# Patient Record
Sex: MALE | Race: WHITE | Employment: OTHER | URBAN - METROPOLITAN AREA
[De-identification: names, ages, dates, MRNs, and addresses within clinical notes are randomized per-mention and may not be internally consistent; named-entity substitution may affect disease eponyms.]

---

## 2017-02-27 ENCOUNTER — OFFICE VISIT (OUTPATIENT)
Dept: FAMILY MEDICINE CLINIC | Age: 72
End: 2017-02-27

## 2017-02-27 VITALS
HEIGHT: 71 IN | WEIGHT: 175 LBS | DIASTOLIC BLOOD PRESSURE: 70 MMHG | BODY MASS INDEX: 24.5 KG/M2 | HEART RATE: 70 BPM | SYSTOLIC BLOOD PRESSURE: 128 MMHG

## 2017-02-27 DIAGNOSIS — G89.29 CHRONIC RIGHT-SIDED LOW BACK PAIN WITHOUT SCIATICA: ICD-10-CM

## 2017-02-27 DIAGNOSIS — G25.0 BENIGN ESSENTIAL TREMOR: Primary | ICD-10-CM

## 2017-02-27 DIAGNOSIS — J30.2 SEASONAL ALLERGIC RHINITIS, UNSPECIFIED ALLERGIC RHINITIS TRIGGER: ICD-10-CM

## 2017-02-27 DIAGNOSIS — M54.50 CHRONIC RIGHT-SIDED LOW BACK PAIN WITHOUT SCIATICA: ICD-10-CM

## 2017-02-27 PROCEDURE — 99214 OFFICE O/P EST MOD 30 MIN: CPT | Performed by: FAMILY MEDICINE

## 2017-02-27 RX ORDER — AZELASTINE 1 MG/ML
2 SPRAY, METERED NASAL 2 TIMES DAILY
Qty: 1 BOTTLE | Refills: 3 | Status: SHIPPED | OUTPATIENT
Start: 2017-02-27 | End: 2017-07-31

## 2017-03-08 ENCOUNTER — OFFICE VISIT (OUTPATIENT)
Dept: CARDIOLOGY CLINIC | Age: 72
End: 2017-03-08

## 2017-03-08 VITALS
HEIGHT: 71 IN | SYSTOLIC BLOOD PRESSURE: 132 MMHG | WEIGHT: 172.8 LBS | OXYGEN SATURATION: 92 % | BODY MASS INDEX: 24.19 KG/M2 | DIASTOLIC BLOOD PRESSURE: 64 MMHG | HEART RATE: 50 BPM

## 2017-03-08 DIAGNOSIS — R00.1 BRADYCARDIA: Primary | ICD-10-CM

## 2017-03-08 DIAGNOSIS — I49.8 OTHER CARDIAC ARRHYTHMIA: ICD-10-CM

## 2017-03-08 DIAGNOSIS — I10 ESSENTIAL HYPERTENSION: ICD-10-CM

## 2017-03-08 DIAGNOSIS — R00.2 PALPITATIONS: ICD-10-CM

## 2017-03-08 PROCEDURE — 99215 OFFICE O/P EST HI 40 MIN: CPT | Performed by: INTERNAL MEDICINE

## 2017-03-08 PROCEDURE — 93000 ELECTROCARDIOGRAM COMPLETE: CPT | Performed by: INTERNAL MEDICINE

## 2017-03-28 ENCOUNTER — OFFICE VISIT (OUTPATIENT)
Dept: FAMILY MEDICINE CLINIC | Age: 72
End: 2017-03-28

## 2017-03-28 VITALS
BODY MASS INDEX: 24.36 KG/M2 | SYSTOLIC BLOOD PRESSURE: 130 MMHG | HEART RATE: 70 BPM | DIASTOLIC BLOOD PRESSURE: 72 MMHG | WEIGHT: 174 LBS | HEIGHT: 71 IN

## 2017-03-28 DIAGNOSIS — G25.0 BENIGN ESSENTIAL TREMOR: Primary | ICD-10-CM

## 2017-03-28 DIAGNOSIS — I49.8 OTHER CARDIAC ARRHYTHMIA: ICD-10-CM

## 2017-03-28 DIAGNOSIS — J30.2 SEASONAL ALLERGIC RHINITIS, UNSPECIFIED ALLERGIC RHINITIS TRIGGER: ICD-10-CM

## 2017-03-28 PROCEDURE — 99214 OFFICE O/P EST MOD 30 MIN: CPT | Performed by: FAMILY MEDICINE

## 2017-03-28 RX ORDER — FLUTICASONE PROPIONATE 50 MCG
2 SPRAY, SUSPENSION (ML) NASAL 2 TIMES DAILY
Qty: 1 BOTTLE | Refills: 3 | Status: SHIPPED | OUTPATIENT
Start: 2017-03-28 | End: 2017-07-31

## 2017-06-01 ENCOUNTER — TELEPHONE (OUTPATIENT)
Dept: CARDIOLOGY CLINIC | Age: 72
End: 2017-06-01

## 2017-06-26 ENCOUNTER — OFFICE VISIT (OUTPATIENT)
Dept: FAMILY MEDICINE CLINIC | Age: 72
End: 2017-06-26

## 2017-06-26 VITALS
HEART RATE: 59 BPM | BODY MASS INDEX: 24.5 KG/M2 | WEIGHT: 175 LBS | SYSTOLIC BLOOD PRESSURE: 101 MMHG | HEIGHT: 71 IN | TEMPERATURE: 98.3 F | DIASTOLIC BLOOD PRESSURE: 49 MMHG

## 2017-06-26 DIAGNOSIS — J01.10 ACUTE NON-RECURRENT FRONTAL SINUSITIS: Primary | ICD-10-CM

## 2017-06-26 PROCEDURE — 99213 OFFICE O/P EST LOW 20 MIN: CPT | Performed by: FAMILY MEDICINE

## 2017-06-26 RX ORDER — AZITHROMYCIN 250 MG/1
TABLET, FILM COATED ORAL
Qty: 1 PACKET | Refills: 0 | Status: SHIPPED | OUTPATIENT
Start: 2017-06-26 | End: 2019-05-27

## 2017-06-26 RX ORDER — METHYLPREDNISOLONE 4 MG/1
TABLET ORAL
Qty: 1 KIT | Refills: 0 | Status: SHIPPED | OUTPATIENT
Start: 2017-06-26 | End: 2017-07-31

## 2017-07-12 ENCOUNTER — TELEPHONE (OUTPATIENT)
Dept: CARDIOLOGY CLINIC | Age: 72
End: 2017-07-12

## 2017-07-24 DIAGNOSIS — R00.1 BRADYCARDIA: ICD-10-CM

## 2017-07-24 LAB
ANION GAP SERPL CALCULATED.3IONS-SCNC: 15 MMOL/L (ref 3–16)
BUN BLDV-MCNC: 21 MG/DL (ref 7–20)
CALCIUM SERPL-MCNC: 9.1 MG/DL (ref 8.3–10.6)
CHLORIDE BLD-SCNC: 107 MMOL/L (ref 99–110)
CO2: 22 MMOL/L (ref 21–32)
CREAT SERPL-MCNC: 1.1 MG/DL (ref 0.8–1.3)
GFR AFRICAN AMERICAN: >60
GFR NON-AFRICAN AMERICAN: >60
GLUCOSE BLD-MCNC: 90 MG/DL (ref 70–99)
HCT VFR BLD CALC: 41.1 % (ref 40.5–52.5)
HEMOGLOBIN: 13.6 G/DL (ref 13.5–17.5)
INR BLD: 0.98 (ref 0.85–1.15)
MCH RBC QN AUTO: 31.9 PG (ref 26–34)
MCHC RBC AUTO-ENTMCNC: 33.2 G/DL (ref 31–36)
MCV RBC AUTO: 96 FL (ref 80–100)
PDW BLD-RTO: 12.7 % (ref 12.4–15.4)
PLATELET # BLD: 202 K/UL (ref 135–450)
PMV BLD AUTO: 9.1 FL (ref 5–10.5)
POTASSIUM SERPL-SCNC: 4.3 MMOL/L (ref 3.5–5.1)
PROTHROMBIN TIME: 11.1 SEC (ref 9.6–13)
RBC # BLD: 4.28 M/UL (ref 4.2–5.9)
SODIUM BLD-SCNC: 144 MMOL/L (ref 136–145)
WBC # BLD: 5.9 K/UL (ref 4–11)

## 2017-08-01 DIAGNOSIS — Z95.0 PACEMAKER: Primary | ICD-10-CM

## 2017-08-07 ENCOUNTER — PROCEDURE VISIT (OUTPATIENT)
Dept: CARDIOLOGY CLINIC | Age: 72
End: 2017-08-07

## 2017-08-07 ENCOUNTER — OFFICE VISIT (OUTPATIENT)
Dept: CARDIOLOGY CLINIC | Age: 72
End: 2017-08-07

## 2017-08-07 VITALS
DIASTOLIC BLOOD PRESSURE: 68 MMHG | WEIGHT: 172.38 LBS | OXYGEN SATURATION: 97 % | HEART RATE: 62 BPM | BODY MASS INDEX: 24.13 KG/M2 | SYSTOLIC BLOOD PRESSURE: 118 MMHG | HEIGHT: 71 IN

## 2017-08-07 DIAGNOSIS — I44.1 SYMPTOMATIC ADVANCED HEART BLOCK: Primary | ICD-10-CM

## 2017-08-07 DIAGNOSIS — Z95.0 PACEMAKER: ICD-10-CM

## 2017-08-07 DIAGNOSIS — I44.1 SYMPTOMATIC ADVANCED HEART BLOCK: ICD-10-CM

## 2017-08-07 DIAGNOSIS — G25.0 BENIGN ESSENTIAL TREMOR: ICD-10-CM

## 2017-08-07 PROCEDURE — 99213 OFFICE O/P EST LOW 20 MIN: CPT | Performed by: NURSE PRACTITIONER

## 2017-08-07 PROCEDURE — 93280 PM DEVICE PROGR EVAL DUAL: CPT | Performed by: INTERNAL MEDICINE

## 2017-08-08 ENCOUNTER — TELEPHONE (OUTPATIENT)
Dept: CARDIOLOGY CLINIC | Age: 72
End: 2017-08-08

## 2017-11-08 ENCOUNTER — PROCEDURE VISIT (OUTPATIENT)
Dept: CARDIOLOGY CLINIC | Age: 72
End: 2017-11-08

## 2017-11-08 ENCOUNTER — OFFICE VISIT (OUTPATIENT)
Dept: CARDIOLOGY CLINIC | Age: 72
End: 2017-11-08

## 2017-11-08 VITALS
DIASTOLIC BLOOD PRESSURE: 70 MMHG | BODY MASS INDEX: 24.22 KG/M2 | HEIGHT: 71 IN | HEART RATE: 56 BPM | SYSTOLIC BLOOD PRESSURE: 136 MMHG | WEIGHT: 173 LBS | OXYGEN SATURATION: 99 %

## 2017-11-08 DIAGNOSIS — Z95.0 PACEMAKER: ICD-10-CM

## 2017-11-08 DIAGNOSIS — R25.1 TREMOR: ICD-10-CM

## 2017-11-08 DIAGNOSIS — R53.83 FATIGUE, UNSPECIFIED TYPE: ICD-10-CM

## 2017-11-08 DIAGNOSIS — I44.1 SYMPTOMATIC ADVANCED HEART BLOCK: Primary | ICD-10-CM

## 2017-11-08 DIAGNOSIS — R00.2 PALPITATIONS: ICD-10-CM

## 2017-11-08 DIAGNOSIS — I44.1 SYMPTOMATIC ADVANCED HEART BLOCK: ICD-10-CM

## 2017-11-08 PROCEDURE — 99214 OFFICE O/P EST MOD 30 MIN: CPT | Performed by: NURSE PRACTITIONER

## 2017-11-08 PROCEDURE — 93280 PM DEVICE PROGR EVAL DUAL: CPT | Performed by: INTERNAL MEDICINE

## 2017-11-08 NOTE — PROGRESS NOTES
Lymphatic ROS: negative for  bleeding problems, blood clots, bruising or jaundice  · Endocrine ROS: negative for  skin changes, temperature intolerance or unexpected weight changes  · Respiratory ROS: negative for  cough, sputum, wheezing, shortness of breath   · Cardiovascular ROS: Per HPI. · Gastrointestinal ROS: negative for abdominal pain, diarrhea, nausea/vomiting, bleeding   · Musculoskeletal ROS: negative for  joint swelling, pain    · Neurological ROS: negative for  confusion, numbness/tingling, seizures, weakness  · Dermatological ROS: negative for rash, changes in skin color, lesions     Physical Examination:  /70 (Site: Left Arm, Position: Sitting, Cuff Size: Large Adult)   Pulse 56   Ht 5' 11\" (1.803 m)   Wt 173 lb (78.5 kg)   SpO2 99%   BMI 24.13 kg/m²     · Constitutional: Oriented. No distress. · Head: Normocephalic and atraumatic. · Mouth/Throat: Oropharynx is clear and moist.   · Eyes: Conjunctivae normal. EOM are normal.   · Neck: Normal range of motion. Neck supple. No rigidity. No JVD present. · Cardiovascular: Normal rate, regular rhythm, S1&S2 and intact distal pulses. · Pulmonary/Chest: Bilateral respiratory sounds. No wheezes. No rhonchi. · Abdominal: Soft. Bowel sounds present. No distension, No tenderness. · Musculoskeletal: No tenderness. No edema    · Neurological: Alert and oriented. Cranial nerve appears intact, No Gross deficit   · Skin: Skin is warm and dry. No rash noted.    · Psychiatric: Has a normal mood, affect and behavior       Labs:  Lab Results   Component Value Date    CREATININE 0.9 08/01/2017    BUN 19 08/01/2017     08/01/2017    K 4.2 08/01/2017     08/01/2017    CO2 23 08/01/2017       ECG: personally reviewed     Echo:  2012  EF 60%  Stress Test:   10/2012  No evidence of reversible ischemia     Device:     [employer] 8 DR-T  MVP  Device interrogated and functioning appropriately  No events no changes     Assessment: Patient Active Problem List    Diagnosis Date Noted    Symptomatic advanced heart block      Priority: Low    Benign essential tremor 02/27/2017     Priority: Low    Acute bronchitis 06/16/2014     Priority: Low    Lymphadenopathy 06/16/2014     Priority: Low    Peyronie's disease 06/16/2014     Priority: Low    Scapular dysfunction 01/21/2014     Priority: Low    Actinic keratoses 03/05/2013     Priority: Low    Back pain 10/24/2012     Priority: Low    Neuropathic pain of foot 10/24/2012     Priority: Low    Chest pain 10/03/2012     Priority: Low    Palpitations 02/22/2012     Priority: Low    Palpitation 02/15/2012     Priority: Low    Erectile dysfunction 02/13/2012     Priority: Low    Cough 02/13/2012     Priority: Low    Arrhythmia      Priority: Low    Allergic rhinitis      Priority: Low    Pacemaker 08/01/2017        Plan:  1. S/p PPM placement   Device interrogated and functioning appropriately  2. Bradycardia/Heart block    PPM protected   3. Fatigue/daytime sleepiness   Recommend outpatient sleep study however patient refuses  4. Tremor and gait instability   Instructed patient needs to address the gait instability with his PCP especially with associated tremors   No contra indication to beta-blocker therapy      Thank you for allowing me to participate in the care of Trista Terry. Further evaluation will be based upon the patient's clinical course and testing results. All questions and concerns were addressed to the patient/family. Alternatives to my treatment were discussed. Bruna Sanders, [address]  Electrophysiology   11/8/2017  8:57 AM      Patient has atrial fibrillation: No    Patient is on anticoagulation therapy:  No     Patient has CAD:  No        Patient instructed on life style modifications   Tobacco use was discussed with the patient and patient educated on the negative effects.  I have asked the patient to not utilize these agents.       FASTING LIPID PANEL:  Lab Results   Component Value Date    HDL 48 02/05/2012    LDLCALC 93 02/05/2012    TRIG 104 02/05/2012

## 2017-11-08 NOTE — PROGRESS NOTES
Patient comes in for programming evaluation for his pacemaker. All sensing and pacing parameters are within normal range. Lowered outputs in both chambers. Please see interrogation for more detail. Patient will follow up in 3 months in office or remotely.

## 2017-11-14 ENCOUNTER — OFFICE VISIT (OUTPATIENT)
Dept: FAMILY MEDICINE CLINIC | Age: 72
End: 2017-11-14

## 2017-11-14 VITALS
HEIGHT: 71 IN | SYSTOLIC BLOOD PRESSURE: 140 MMHG | BODY MASS INDEX: 24.22 KG/M2 | HEART RATE: 60 BPM | WEIGHT: 173 LBS | DIASTOLIC BLOOD PRESSURE: 80 MMHG

## 2017-11-14 DIAGNOSIS — Z00.00 WELL ADULT EXAM: Primary | ICD-10-CM

## 2017-11-14 DIAGNOSIS — M89.9 SCAPULAR DYSFUNCTION: ICD-10-CM

## 2017-11-14 DIAGNOSIS — G25.0 BENIGN ESSENTIAL TREMOR: ICD-10-CM

## 2017-11-14 DIAGNOSIS — Z13.220 SCREENING CHOLESTEROL LEVEL: ICD-10-CM

## 2017-11-14 DIAGNOSIS — Z23 NEED FOR INFLUENZA VACCINATION: ICD-10-CM

## 2017-11-14 DIAGNOSIS — I44.1 SYMPTOMATIC ADVANCED HEART BLOCK: ICD-10-CM

## 2017-11-14 LAB
A/G RATIO: 1.3 (ref 1.1–2.2)
ALBUMIN SERPL-MCNC: 4 G/DL (ref 3.4–5)
ALP BLD-CCNC: 45 U/L (ref 40–129)
ALT SERPL-CCNC: 14 U/L (ref 10–40)
ANION GAP SERPL CALCULATED.3IONS-SCNC: 13 MMOL/L (ref 3–16)
AST SERPL-CCNC: 22 U/L (ref 15–37)
BILIRUB SERPL-MCNC: 0.4 MG/DL (ref 0–1)
BUN BLDV-MCNC: 20 MG/DL (ref 7–20)
CALCIUM SERPL-MCNC: 9.8 MG/DL (ref 8.3–10.6)
CHLORIDE BLD-SCNC: 101 MMOL/L (ref 99–110)
CHOLESTEROL, TOTAL: 201 MG/DL (ref 0–199)
CO2: 28 MMOL/L (ref 21–32)
CREAT SERPL-MCNC: 1 MG/DL (ref 0.8–1.3)
GFR AFRICAN AMERICAN: >60
GFR NON-AFRICAN AMERICAN: >60
GLOBULIN: 3.2 G/DL
GLUCOSE BLD-MCNC: 89 MG/DL (ref 70–99)
HDLC SERPL-MCNC: 63 MG/DL (ref 40–60)
LDL CHOLESTEROL CALCULATED: 112 MG/DL
POTASSIUM SERPL-SCNC: 4.9 MMOL/L (ref 3.5–5.1)
SODIUM BLD-SCNC: 142 MMOL/L (ref 136–145)
TOTAL PROTEIN: 7.2 G/DL (ref 6.4–8.2)
TRIGL SERPL-MCNC: 131 MG/DL (ref 0–150)
VLDLC SERPL CALC-MCNC: 26 MG/DL

## 2017-11-14 PROCEDURE — 99397 PER PM REEVAL EST PAT 65+ YR: CPT | Performed by: FAMILY MEDICINE

## 2017-11-14 PROCEDURE — 90662 IIV NO PRSV INCREASED AG IM: CPT | Performed by: FAMILY MEDICINE

## 2017-11-14 PROCEDURE — 36415 COLL VENOUS BLD VENIPUNCTURE: CPT | Performed by: FAMILY MEDICINE

## 2017-11-14 PROCEDURE — G0008 ADMIN INFLUENZA VIRUS VAC: HCPCS | Performed by: FAMILY MEDICINE

## 2017-11-14 RX ORDER — TIZANIDINE 4 MG/1
4 TABLET ORAL NIGHTLY PRN
Qty: 20 TABLET | Refills: 0 | Status: SHIPPED | OUTPATIENT
Start: 2017-11-14 | End: 2018-10-02 | Stop reason: ALTCHOICE

## 2017-11-14 RX ORDER — PROPRANOLOL HCL 60 MG
60 CAPSULE, EXTENDED RELEASE 24HR ORAL DAILY
Qty: 30 CAPSULE | Refills: 5 | Status: SHIPPED | OUTPATIENT
Start: 2017-11-14 | End: 2018-03-05 | Stop reason: ALTCHOICE

## 2017-11-14 NOTE — PROGRESS NOTES
Call Labs were okay.
02/05/2012     Lab Results   Component Value Date    HDL 48 02/05/2012     Lab Results   Component Value Date    LDLCALC 93 02/05/2012     Lab Results   Component Value Date    LABVLDL 21 02/05/2012         Assessment   Plan     1. Well adult exam  Appears stable: Counselled diet, development, anticipatory guidance and safety issues with patient and or parent(s). 2. Need for influenza vaccination  vacinate  - INFLUENZA, HIGH DOSE, 65 YRS +, IM, PF, PREFILL SYR, 0.5ML (FLUZONE HD)    3. Symptomatic advanced heart block  S/p PPM, continue to follow. 4. Benign essential tremor  Will trial propranolol, counseled pt expect 50%improvement and may use on PRN basis, may add primidone if needs. - propranolol (INDERAL LA) 60 MG extended release capsule; Take 1 capsule by mouth daily  Dispense: 30 capsule; Refill: 5    5. Scapular dysfunction  Chronic: continue prn zanaflex. - tiZANidine (ZANAFLEX) 4 MG tablet; Take 1 tablet by mouth nightly as needed (1 hour before bed  - then ice it.)  Dispense: 20 tablet; Refill: 0    6. Screening cholesterol level  Screen. - Comprehensive Metabolic Panel  - Lipid Panel    Andrew received counseling on the following healthy behaviors: nutrition and exercise    Patient given educational materials on Nutrition and Exercise    Discussed use, benefit, and side effects of prescribed medications. Barriers to medication compliance addressed. All patient questions answered. Pt voiced understanding. RTC in 5 months (will be in [place] until march).

## 2018-01-05 ENCOUNTER — TELEPHONE (OUTPATIENT)
Dept: FAMILY MEDICINE CLINIC | Age: 73
End: 2018-01-05

## 2018-01-05 NOTE — TELEPHONE ENCOUNTER
Patient notes he is having chest pain radiating to his neck and armpit with exertion when he was on the medication. Tells me he went to the emergency room and they told him to stop the medication and since that time his been able to walk without pain. I recommended patient get a stress test he said he wants to wait until he comes back and sees me. Patient contracts to go to the emergency room if symptoms recur.

## 2018-01-05 NOTE — TELEPHONE ENCOUNTER
Pt is in [place]. Wanted to let you know that he has discontinued taking profanathol (he was taking this med for tremors). States the med caused him to have severe chest pains. Wanted to let you know.  If you would like to speak with him, please call him at [phone]

## 2018-01-15 ENCOUNTER — TELEPHONE (OUTPATIENT)
Dept: FAMILY MEDICINE CLINIC | Age: 73
End: 2018-01-15

## 2018-01-15 NOTE — TELEPHONE ENCOUNTER
Please call:  I would have him see a cardiologist affiliated with a hospital that does cardiothoracic surgery.       Unfortunately, I do not have local knowledge of such a facility down there

## 2018-02-13 ENCOUNTER — NURSE ONLY (OUTPATIENT)
Dept: CARDIOLOGY CLINIC | Age: 73
End: 2018-02-13

## 2018-02-13 DIAGNOSIS — I44.1 SYMPTOMATIC ADVANCED HEART BLOCK: ICD-10-CM

## 2018-02-13 DIAGNOSIS — Z95.0 PACEMAKER: ICD-10-CM

## 2018-02-13 PROCEDURE — 93296 REM INTERROG EVL PM/IDS: CPT | Performed by: INTERNAL MEDICINE

## 2018-02-13 PROCEDURE — 93294 REM INTERROG EVL PM/LDLS PM: CPT | Performed by: INTERNAL MEDICINE

## 2018-02-13 NOTE — PROGRESS NOTES
Remote/Biotronik interrogation shows normal device function. No new episodes/arrhythmias recorded. Dr. Garzon Gisela to review interrogation. See interrogation/Paceart report for further details.

## 2018-03-05 ENCOUNTER — OFFICE VISIT (OUTPATIENT)
Dept: CARDIOLOGY CLINIC | Age: 73
End: 2018-03-05

## 2018-03-05 VITALS
BODY MASS INDEX: 24.3 KG/M2 | OXYGEN SATURATION: 97 % | SYSTOLIC BLOOD PRESSURE: 130 MMHG | WEIGHT: 173.6 LBS | DIASTOLIC BLOOD PRESSURE: 80 MMHG | HEIGHT: 71 IN | HEART RATE: 66 BPM

## 2018-03-05 DIAGNOSIS — R07.2 PRECORDIAL PAIN: ICD-10-CM

## 2018-03-05 DIAGNOSIS — R00.1 BRADYCARDIA: Primary | ICD-10-CM

## 2018-03-05 DIAGNOSIS — I10 ESSENTIAL HYPERTENSION: ICD-10-CM

## 2018-03-05 PROCEDURE — 93000 ELECTROCARDIOGRAM COMPLETE: CPT | Performed by: INTERNAL MEDICINE

## 2018-03-05 PROCEDURE — 99215 OFFICE O/P EST HI 40 MIN: CPT | Performed by: INTERNAL MEDICINE

## 2018-03-05 NOTE — PATIENT INSTRUCTIONS
Patient Education        Chest Pain: Care Instructions  Your Care Instructions    There are many things that can cause chest pain. Some are not serious and will get better on their own in a few days. But some kinds of chest pain need more testing and treatment. Your doctor may have recommended a follow-up visit in the next 8 to 12 hours. If you are not getting better, you may need more tests or treatment. Even though your doctor has released you, you still need to watch for any problems. The doctor carefully checked you, but sometimes problems can develop later. If you have new symptoms or if your symptoms do not get better, get medical care right away. If you have worse or different chest pain or pressure that lasts more than 5 minutes or you passed out (lost consciousness), call 911 or seek other emergency help right away. A medical visit is only one step in your treatment. Even if you feel better, you still need to do what your doctor recommends, such as going to all suggested follow-up appointments and taking medicines exactly as directed. This will help you recover and help prevent future problems. How can you care for yourself at home? · Rest until you feel better. · Take your medicine exactly as prescribed. Call your doctor if you think you are having a problem with your medicine. · Do not drive after taking a prescription pain medicine. When should you call for help? Call 911 if:  ? · You passed out (lost consciousness). ? · You have severe difficulty breathing. ? · You have symptoms of a heart attack. These may include:  ¨ Chest pain or pressure, or a strange feeling in your chest.  ¨ Sweating. ¨ Shortness of breath. ¨ Nausea or vomiting. ¨ Pain, pressure, or a strange feeling in your back, neck, jaw, or upper belly or in one or both shoulders or arms. ¨ Lightheadedness or sudden weakness. ¨ A fast or irregular heartbeat.   After you call 911, the  may tell you to chew 1 adult-strength or 2 to 4 low-dose aspirin. Wait for an ambulance. Do not try to drive yourself. ?Call your doctor today if:  ? · You have any trouble breathing. ? · Your chest pain gets worse. ? · You are dizzy or lightheaded, or you feel like you may faint. ? · You are not getting better as expected. ? · You are having new or different chest pain. Where can you learn more? Go to [email or web address]://[email or web address].[email or web address]. org and sign in to your [email or web address] account. Enter A120 in the Tripbod box to learn more about \"Chest Pain: Care Instructions. \"     If you do not have an account, please click on the \"Sign Up Now\" link. Current as of: March 20, 2017  Content Version: 11.5  © 4508-1778 Healthwise, Incorporated. Care instructions adapted under license by [facility] ([facility]). If you have questions about a medical condition or this instruction, always ask your healthcare professional. [place] [address] any warranty or liability for your use of this information.

## 2018-03-08 ENCOUNTER — HOSPITAL ENCOUNTER (OUTPATIENT)
Dept: NON INVASIVE DIAGNOSTICS | Age: 73
Discharge: HOME | End: 2018-03-08
Admitting: INTERNAL MEDICINE

## 2018-03-08 DIAGNOSIS — R07.2 PRECORDIAL PAIN: ICD-10-CM

## 2018-03-08 LAB
LV EF: 68 %
LVEF MODALITY: NORMAL

## 2018-03-28 ENCOUNTER — OFFICE VISIT (OUTPATIENT)
Dept: FAMILY MEDICINE CLINIC | Age: 73
End: 2018-03-28

## 2018-03-28 VITALS
BODY MASS INDEX: 24.53 KG/M2 | WEIGHT: 175.25 LBS | OXYGEN SATURATION: 97 % | HEIGHT: 71 IN | DIASTOLIC BLOOD PRESSURE: 74 MMHG | SYSTOLIC BLOOD PRESSURE: 118 MMHG | HEART RATE: 76 BPM

## 2018-03-28 DIAGNOSIS — G25.0 BENIGN ESSENTIAL TREMOR: Primary | ICD-10-CM

## 2018-03-28 DIAGNOSIS — I44.1 SYMPTOMATIC ADVANCED HEART BLOCK: ICD-10-CM

## 2018-03-28 PROCEDURE — 99214 OFFICE O/P EST MOD 30 MIN: CPT | Performed by: FAMILY MEDICINE

## 2018-03-28 ASSESSMENT — ENCOUNTER SYMPTOMS
SHORTNESS OF BREATH: 0
NAUSEA: 0
COUGH: 0
DIARRHEA: 0
CONSTIPATION: 0
VOMITING: 0
ABDOMINAL PAIN: 0

## 2018-03-28 ASSESSMENT — PATIENT HEALTH QUESTIONNAIRE - PHQ9
SUM OF ALL RESPONSES TO PHQ9 QUESTIONS 1 & 2: 0
SUM OF ALL RESPONSES TO PHQ QUESTIONS 1-9: 0
2. FEELING DOWN, DEPRESSED OR HOPELESS: 0
1. LITTLE INTEREST OR PLEASURE IN DOING THINGS: 0

## 2018-03-28 NOTE — PROGRESS NOTES
Chief Complaint   Patient presents with    Tremors     Follow up on medication- Med did not work out for him       HPI: General Collin presents for evaluation and management of Tremor. He has noted a chronic tremor that tended to get worse towards the end of the day and was affecting his ability to work as he is a  and also even affecting fine movements such as moving a mouse for computer. He tells me that he thinks it gets worse after a night of drinking. That said he has no plans of cutting back. He tells me that the medicine I gave him in the past for this tremor, Inderal was giving him chest pain so he quit. He notes no exertional chest pain and less he is taking his propranolol. He saw Dr. Alecia Magaña earlier this month and told him about his substernal chest pain that radiated out to his shoulders and neck and underwent a nuclear stress test for this. It should be noticed that he is paced for advanced heart block. His nuclear stress test was normal.  He notes no persistence of chest pain at this time      Review of Systems   Constitutional: Negative for chills and fever. Respiratory: Negative for cough and shortness of breath. Cardiovascular: Negative for chest pain and palpitations. Gastrointestinal: Negative for abdominal pain, constipation, diarrhea, nausea and vomiting. Endocrine: Negative for polyuria. Genitourinary: Negative for dysuria. Allergies   Allergen Reactions    Inderal [Propranolol] Other (See Comments)     Severe Chest Pains     New Prescriptions    No medications on file     Current Outpatient Prescriptions   Medication Sig Dispense Refill    tiZANidine (ZANAFLEX) 4 MG tablet Take 1 tablet by mouth nightly as needed (1 hour before bed  - then ice it.) 20 tablet 0     No current facility-administered medications for this visit.         Past Medical History:   Diagnosis Date    Advance directive discussed with patient     DNR - Has Living will    Allergic

## 2018-06-05 ENCOUNTER — NURSE ONLY (OUTPATIENT)
Dept: CARDIOLOGY CLINIC | Age: 73
End: 2018-06-05

## 2018-06-05 DIAGNOSIS — I44.1 SYMPTOMATIC ADVANCED HEART BLOCK: ICD-10-CM

## 2018-06-05 DIAGNOSIS — Z95.0 PACEMAKER: ICD-10-CM

## 2018-06-05 PROCEDURE — 93296 REM INTERROG EVL PM/IDS: CPT | Performed by: INTERNAL MEDICINE

## 2018-06-05 PROCEDURE — 93294 REM INTERROG EVL PM/LDLS PM: CPT | Performed by: INTERNAL MEDICINE

## 2018-09-10 PROCEDURE — 93294 REM INTERROG EVL PM/LDLS PM: CPT | Performed by: INTERNAL MEDICINE

## 2018-09-10 PROCEDURE — 93296 REM INTERROG EVL PM/IDS: CPT | Performed by: INTERNAL MEDICINE

## 2018-09-11 ENCOUNTER — NURSE ONLY (OUTPATIENT)
Dept: CARDIOLOGY CLINIC | Age: 73
End: 2018-09-11

## 2018-09-11 DIAGNOSIS — Z95.0 PACEMAKER: ICD-10-CM

## 2018-09-11 DIAGNOSIS — R00.2 PALPITATIONS: ICD-10-CM

## 2018-09-11 DIAGNOSIS — I44.1 SYMPTOMATIC ADVANCED HEART BLOCK: ICD-10-CM

## 2018-09-11 NOTE — LETTER
[address] [facility] [phone]  [facility] [address] [phone]    Pacemaker/Defibrillator Clinic          09/10/18        [place] [address]        Dear Shukri Part Day    This letter is to inform you that we received the transmission from your monitor at home that checks your pacemaker and/or defibrillator, or implanted heart monitor. The next date your monitor will automatically transmit will be 12/18/18. Please do not send additional routine transmissions unless specifically requested. Your device and monitor are wireless and most transmit cellularly, but please periodically check your monitor is still plugged in to the electrical outlet. If you still use the telephone land line to send please ensure the connection to the phone javier is secure. This will help to ensure successful automatic transmissions in the future. Also, the monitor needs to be close to you while sleeping at night. Please be aware that the remote device transmission sites are periodically monitored only during regular business hours during which simultaneous in-office device clinics are being run. If your transmission requires attention, we will contact you as soon as possible. Thank you.             [address]

## 2018-10-02 ENCOUNTER — OFFICE VISIT (OUTPATIENT)
Dept: FAMILY MEDICINE CLINIC | Age: 73
End: 2018-10-02
Payer: MEDICARE

## 2018-10-02 VITALS
BODY MASS INDEX: 23.66 KG/M2 | SYSTOLIC BLOOD PRESSURE: 91 MMHG | HEART RATE: 59 BPM | WEIGHT: 169 LBS | HEIGHT: 71 IN | DIASTOLIC BLOOD PRESSURE: 64 MMHG

## 2018-10-02 DIAGNOSIS — G89.29 CHRONIC RIGHT-SIDED LOW BACK PAIN WITHOUT SCIATICA: ICD-10-CM

## 2018-10-02 DIAGNOSIS — M54.50 CHRONIC RIGHT-SIDED LOW BACK PAIN WITHOUT SCIATICA: ICD-10-CM

## 2018-10-02 DIAGNOSIS — Z13.6 SCREENING FOR AAA (ABDOMINAL AORTIC ANEURYSM): ICD-10-CM

## 2018-10-02 DIAGNOSIS — Z12.11 SCREEN FOR COLON CANCER: ICD-10-CM

## 2018-10-02 DIAGNOSIS — I49.8 OTHER CARDIAC ARRHYTHMIA: Primary | ICD-10-CM

## 2018-10-02 DIAGNOSIS — G20 PARKINSON DISEASE (HCC): ICD-10-CM

## 2018-10-02 DIAGNOSIS — Z23 NEED FOR VACCINATION: ICD-10-CM

## 2018-10-02 PROCEDURE — G0008 ADMIN INFLUENZA VIRUS VAC: HCPCS | Performed by: FAMILY MEDICINE

## 2018-10-02 PROCEDURE — 99214 OFFICE O/P EST MOD 30 MIN: CPT | Performed by: FAMILY MEDICINE

## 2018-10-02 PROCEDURE — G0009 ADMIN PNEUMOCOCCAL VACCINE: HCPCS | Performed by: FAMILY MEDICINE

## 2018-10-02 PROCEDURE — 90732 PPSV23 VACC 2 YRS+ SUBQ/IM: CPT | Performed by: FAMILY MEDICINE

## 2018-10-02 PROCEDURE — 90682 RIV4 VACC RECOMBINANT DNA IM: CPT | Performed by: FAMILY MEDICINE

## 2018-10-02 ASSESSMENT — ENCOUNTER SYMPTOMS
ABDOMINAL PAIN: 0
COUGH: 0
SHORTNESS OF BREATH: 0
CONSTIPATION: 0
DIARRHEA: 0
NAUSEA: 0
VOMITING: 0

## 2018-10-02 NOTE — PROGRESS NOTES
Vaccine Information Sheet, \"Influenza - Inactivated\"  given to Barry Terry, or parent/legal guardian of  Barry Terry and verbalized understanding. Patient responses:    Have you ever had a reaction to a flu vaccine? No  Are you able to eat eggs without adverse effects? Yes  Do you have any current illness? No  Have you ever had [place] Syndrome? No    Flu vaccine given per order. Please see immunization tab.
Saira Schneider when he comes back in to clinic if he is interested    2. Other cardiac arrhythmia  Controlled: Appears stable. We will continue current management and monitor for adverse reaction and disease progression. Follow-up as noted below      3. Chronic right-sided low back pain without sciatica  Controlled: Appears stable. We will continue current management and monitor for adverse reaction and disease progression. Follow-up as noted below      4. Screen for colon cancer  Screen  - Amb External Referral To Gastroenterology    5. Need for vaccination  Vaccinate  - Pneumococcal polysaccharide vaccine 23-valent greater than or equal to 3yo subcutaneous/IM  - INFLUENZA, QUADV, RECOMBINANT, 18 YRS AND OLDER, IM, PF, PREFILL SYR OR SDV, 0.5ML (FLUBLOK QUADV, PF)    6. Screening for AAA (abdominal aortic aneurysm)  Screen  - US ABDOMINAL AORTA LIMITED      RTC 6-12 months and as needed    Scribe attestation:  I, Rosa Carney, am scribing for and in the presence of Cassidy Liao MD. Electronically signed by Rosa Carney on 10/2/2018 at 4:08 PM            Provider attestation: Isma Tinsley MD, personally performed the services scribed by the user listed above in my presence, and it is both accurate and complete. I agree with the ROS and Past Histories independently gathered by the clinical support staff and the remaining scribed note accurately describes my personal service to the patient.     [facility]    10/2/2018  4:31 PM

## 2018-10-05 NOTE — PROGRESS NOTES
[address]   Cardiac Electrophysiology Consultation   Date: 10/5/2018  Reason for Consultation: Device Management  Consult Requesting Physician: Servando Trevino MD    Chief Complaint:   Chief Complaint   Patient presents with    6 Month Follow-Up     S/P PPM. Device check today- fatigue        HPI: Delmy Terry is a 75 y. o. with a  PMH significant for HTN who was initially seen in consultation on 10/31/16 for bradycardia. He had a long standing history of asymptomatic bradycardia that was not limiting his activity. It was decided to defer a PPM and montor-- however, one year later he reported progressive symptoms and decided to undergo Biotronik 2-chamber PPM implant 7/2017. Today, he arrives for a follow up for device management. He reports ongoing issues with fatigue and lightheadedness with quick positional changes. He doesn't feel that the device implant has helped with his symptoms. Regardless, he just returned from hiking in [facility] and doesn't necessarily feel limited in his activity level. He walks approximately 5 miles per day and swims often for exercise. 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 He denies lightheadedness, dizziness, chest pain, palpitations, orthopnea, edema, presyncope or syncope. Past Medical History:   Diagnosis Date    Advance directive discussed with patient     DNR - Has Living will    Allergic rhinitis     Arrhythmia     s/p pacemaker for heart block: Dr. Migeul Ángel Mabry 7/31/17    Benign essential tremor 2/27/2017    Brachial neuritis     Chronic back pain     Erectile dysfunction 2/13/2012    Hypertension 2/13/2012    Parkinson disease ([facility]     Dr. Jailyn Pat Symptomatic advanced heart block         Past Surgical History:   Procedure Laterality Date   [address]    ACDF   [address]    with Boswell ankur       Allergies:   Allergies   Allergen Reactions    Inderal [Propranolol] Other (See Comments) Severe Chest Pains       Medication:   Prior to Admission medications    Medication Sig Start Date End Date Taking? Authorizing Provider   carbidopa-levodopa (SINEMET)  MG per tablet  8/7/18  Yes Historical Provider, MD       Social History:   reports that he quit smoking about 11 years ago. He smoked 0.00 packs per day. He has quit using smokeless tobacco. He reports that he drinks about 9.0 oz of alcohol per week . Family History:  family history includes Other in his father; Stroke in his mother. Reviewed. Denies family history of sudden cardiac death, arrhythmia, premature CAD    Review of System:    · General ROS: negative for - chills, fever +fatigue  · Psychological ROS: negative for - anxiety or depression  · Ophthalmic ROS: negative for - eye pain or loss of vision  · ENT ROS: negative for - epistaxis, headaches, nasal discharge, sore throat   · Allergy and Immunology ROS: negative for - hives, nasal congestion   · Hematological and Lymphatic ROS: negative for - bleeding problems, blood clots, bruising or jaundice  · Endocrine ROS: negative for - skin changes, temperature intolerance or unexpected weight changes  · Respiratory ROS: negative for - cough, hemoptysis, pleuritic pain, SOB, sputum changes or wheezing  · Cardiovascular ROS: Per HPI. · Gastrointestinal ROS: negative for - abdominal pain, blood in stools, diarrhea, hematemesis, melena,  nausea/vomiting or swallowing difficulty/pain  · Genito-Urinary ROS: negative for - dysuria or incontinence  · Musculoskeletal ROS: negative for - joint swelling or muscle pain  · Neurological ROS: negative for - confusion, dizziness, gait disturbance, headaches, numbness/tingling,  seizures, speech problems, tremors, visual changes or weakness  · Dermatological ROS: negative for - rash    Physical Examination:  Vitals:    10/08/18 0823   BP: 122/82   Pulse: 68   SpO2: 99%       · Constitutional: Oriented. No distress.    · Head: Normocephalic and atraumatic. · Mouth/Throat: Oropharynx is clear and moist.   · Eyes: Conjunctivae normal. EOM are normal.   · Neck: Normal range of motion. Neck supple. No rigidity. No JVD present. · Cardiovascular: Normal rate, regular rhythm, S1&S2 and intact distal pulses. · Pulmonary/Chest: Bilateral respiratory sounds. No wheezes. No rhonchi. · Abdominal: Soft. Bowel sounds present. No distension, No tenderness. · Musculoskeletal: No tenderness. No edema    · Lymphadenopathy: Has no cervical adenopathy. · Neurological: Alert and oriented. Cranial nerve appears intact, No Gross deficit   · Skin: Skin is warm and dry. No rash noted. +LCW device implant  · Psychiatric: Has a normal mood, affect and behavior     Labs:  Reviewed. ECG: reviewed, 10/8/18 V paced with underlying SR    Other Non-Invasive Studies:   1. Event monitor:   None    2. Echo: 2/2012  Normal study. LVEF 60%    3. Stress Test:   Normal myocardial perfusion study.    Normal myocardial perfusion.    Normal LV size and systolic function.    Overall findings represent a low risk study.         4. Cath:   None     Procedures:  1.  Biotronik dual chamber PPM implant on 8/7/17 with Dr. Todd Rodriguez:   Patient Active Problem List    Diagnosis Date Noted    Parkinson disease [facility])     Pacemaker 08/01/2017    Symptomatic advanced heart block     Benign essential tremor 02/27/2017    Peyronie's disease 06/16/2014    Scapular dysfunction 01/21/2014    Actinic keratoses 03/05/2013    Back pain 10/24/2012    Neuropathic pain of foot 10/24/2012    Erectile dysfunction 02/13/2012    Arrhythmia     Allergic rhinitis         Plan:    Bradycardia with premature junctional beats, PVC's and blocked PAC's:  -S/p Biotronik PPM implant  -Device interrogated today and functioning appropriately  -AP 59%,  69%  -No afib/ flutter  -Approximately 8.5 years remaining on battery life  -He denies that the device is helping  -In order to decipher whether

## 2018-10-08 ENCOUNTER — NURSE ONLY (OUTPATIENT)
Dept: CARDIOLOGY CLINIC | Age: 73
End: 2018-10-08
Payer: MEDICARE

## 2018-10-08 ENCOUNTER — HOSPITAL ENCOUNTER (EMERGENCY)
Age: 73
Discharge: HOME | End: 2018-10-08
Attending: EMERGENCY MEDICINE
Payer: MEDICARE

## 2018-10-08 ENCOUNTER — TELEPHONE (OUTPATIENT)
Dept: CARDIOLOGY CLINIC | Age: 73
End: 2018-10-08

## 2018-10-08 ENCOUNTER — HOSPITAL ENCOUNTER (OUTPATIENT)
Dept: ULTRASOUND IMAGING | Age: 73
Discharge: HOME | End: 2018-10-08
Payer: MEDICARE

## 2018-10-08 ENCOUNTER — OFFICE VISIT (OUTPATIENT)
Dept: CARDIOLOGY CLINIC | Age: 73
End: 2018-10-08
Payer: MEDICARE

## 2018-10-08 VITALS
SYSTOLIC BLOOD PRESSURE: 124 MMHG | HEART RATE: 67 BPM | RESPIRATION RATE: 14 BRPM | OXYGEN SATURATION: 98 % | HEIGHT: 71 IN | BODY MASS INDEX: 23.8 KG/M2 | DIASTOLIC BLOOD PRESSURE: 51 MMHG | WEIGHT: 169.97 LBS | TEMPERATURE: 97.6 F

## 2018-10-08 VITALS
SYSTOLIC BLOOD PRESSURE: 122 MMHG | HEIGHT: 71 IN | HEART RATE: 68 BPM | DIASTOLIC BLOOD PRESSURE: 82 MMHG | OXYGEN SATURATION: 99 % | BODY MASS INDEX: 23.24 KG/M2 | WEIGHT: 166 LBS

## 2018-10-08 DIAGNOSIS — Z95.0 PACEMAKER: ICD-10-CM

## 2018-10-08 DIAGNOSIS — R00.1 SEVERE SINUS BRADYCARDIA: ICD-10-CM

## 2018-10-08 DIAGNOSIS — R00.1 BRADYCARDIA: ICD-10-CM

## 2018-10-08 DIAGNOSIS — Z13.6 SCREENING FOR AAA (ABDOMINAL AORTIC ANEURYSM): ICD-10-CM

## 2018-10-08 DIAGNOSIS — I49.8 OTHER CARDIAC ARRHYTHMIA: Primary | ICD-10-CM

## 2018-10-08 DIAGNOSIS — I44.1 SYMPTOMATIC ADVANCED HEART BLOCK: ICD-10-CM

## 2018-10-08 DIAGNOSIS — R55 SYNCOPE AND COLLAPSE: Primary | ICD-10-CM

## 2018-10-08 LAB
ANION GAP SERPL CALCULATED.3IONS-SCNC: 12 MMOL/L (ref 3–16)
BASOPHILS ABSOLUTE: 0 K/UL (ref 0–0.2)
BASOPHILS RELATIVE PERCENT: 0.7 %
BUN BLDV-MCNC: 18 MG/DL (ref 7–20)
CALCIUM SERPL-MCNC: 9.1 MG/DL (ref 8.3–10.6)
CHLORIDE BLD-SCNC: 103 MMOL/L (ref 99–110)
CO2: 24 MMOL/L (ref 21–32)
CREAT SERPL-MCNC: 1.1 MG/DL (ref 0.8–1.3)
EOSINOPHILS ABSOLUTE: 0.1 K/UL (ref 0–0.6)
EOSINOPHILS RELATIVE PERCENT: 1.5 %
GFR AFRICAN AMERICAN: >60
GFR NON-AFRICAN AMERICAN: >60
GLUCOSE BLD-MCNC: 171 MG/DL (ref 70–99)
HCT VFR BLD CALC: 43.5 % (ref 40.5–52.5)
HEMOGLOBIN: 14.3 G/DL (ref 13.5–17.5)
LYMPHOCYTES ABSOLUTE: 1.1 K/UL (ref 1–5.1)
LYMPHOCYTES RELATIVE PERCENT: 18.8 %
MCH RBC QN AUTO: 31.3 PG (ref 26–34)
MCHC RBC AUTO-ENTMCNC: 32.9 G/DL (ref 31–36)
MCV RBC AUTO: 95.2 FL (ref 80–100)
MONOCYTES ABSOLUTE: 0.4 K/UL (ref 0–1.3)
MONOCYTES RELATIVE PERCENT: 5.9 %
NEUTROPHILS ABSOLUTE: 4.3 K/UL (ref 1.7–7.7)
NEUTROPHILS RELATIVE PERCENT: 73.1 %
PDW BLD-RTO: 13.1 % (ref 12.4–15.4)
PLATELET # BLD: 241 K/UL (ref 135–450)
PMV BLD AUTO: 8.5 FL (ref 5–10.5)
POTASSIUM SERPL-SCNC: 4 MMOL/L (ref 3.5–5.1)
RBC # BLD: 4.57 M/UL (ref 4.2–5.9)
SODIUM BLD-SCNC: 139 MMOL/L (ref 136–145)
TROPONIN: <0.01 NG/ML
WBC # BLD: 6 K/UL (ref 4–11)

## 2018-10-08 PROCEDURE — 76706 US ABDL AORTA SCREEN AAA: CPT

## 2018-10-08 PROCEDURE — 84484 ASSAY OF TROPONIN QUANT: CPT

## 2018-10-08 PROCEDURE — 93280 PM DEVICE PROGR EVAL DUAL: CPT | Performed by: INTERNAL MEDICINE

## 2018-10-08 PROCEDURE — 99284 EMERGENCY DEPT VISIT MOD MDM: CPT

## 2018-10-08 PROCEDURE — 93005 ELECTROCARDIOGRAM TRACING: CPT | Performed by: EMERGENCY MEDICINE

## 2018-10-08 PROCEDURE — 85025 COMPLETE CBC W/AUTO DIFF WBC: CPT

## 2018-10-08 PROCEDURE — 99214 OFFICE O/P EST MOD 30 MIN: CPT | Performed by: INTERNAL MEDICINE

## 2018-10-08 PROCEDURE — 80048 BASIC METABOLIC PNL TOTAL CA: CPT

## 2018-10-08 PROCEDURE — 93010 ELECTROCARDIOGRAM REPORT: CPT | Performed by: INTERNAL MEDICINE

## 2018-10-08 RX ORDER — 0.9 % SODIUM CHLORIDE 0.9 %
500 INTRAVENOUS SOLUTION INTRAVENOUS ONCE
Status: DISCONTINUED | OUTPATIENT
Start: 2018-10-08 | End: 2018-10-08 | Stop reason: HOSPADM

## 2018-10-08 NOTE — ED PROVIDER NOTES
patient     DNR - Has Living will    Allergic rhinitis     Arrhythmia     s/p pacemaker for heart block: Dr. Roseann Yancey 17    Benign essential tremor 2017    Brachial neuritis     Chronic back pain     Erectile dysfunction 2012    Hypertension 2012    Parkinson disease ([facility]     Dr. Yuly Stewart    Symptomatic advanced heart block          SURGICAL HISTORY       Past Surgical History:   Procedure Laterality Date   [address]    ACDF    LUMBAR LAMINECTOMY      with Boswell ankur         CURRENT MEDICATIONS       Previous Medications    CARBIDOPA-LEVODOPA (SINEMET)  MG PER TABLET           ALLERGIES     Inderal [propranolol]    FAMILY HISTORY       Family History   Problem Relation Age of Onset    Stroke Mother          80    Other Father          80 Viral Meningitis          SOCIAL HISTORY       Social History     Social History    Marital status: Single     Spouse name: Patsy Hui Number of children: N/A    Years of education: N/A     Occupational History    [employer]      Social History Main Topics    Smoking status: Former Smoker     Packs/day: 0.00     Quit date: 2007    Smokeless tobacco: Former User    Alcohol use 9.0 oz/week     15 Cans of beer per week      Comment: occassional    Drug use: Unknown    Sexual activity: Not Asked     Other Topics Concern    None     Social History Narrative    None         PHYSICAL EXAM    (up to 7 for level 4, 8 or more for level 5)     ED Triage Vitals   BP Temp Temp src Pulse Resp SpO2 Height Weight   -- -- -- -- -- -- -- --       General: Alert well-appearing male in no acute distress. Head: Atraumatic and normocephalic. Eyes: Pupils equal and reactive. Extraocular movements are intact. ENT: No facial trauma. Oropharynx negative. Neck: Supple without adenopathy or JVD. Heart: Regular rate and rhythm. No murmurs gallops noted. Lungs: Breath sounds equal bilaterally and clear.   Abdomen:

## 2018-10-08 NOTE — ED NOTES
Bed: A-17  Expected date:   Expected time:   Means of arrival: [facility]  Comments:  71M Tammy Jameson RN  10/08/18 7225

## 2018-10-08 NOTE — TELEPHONE ENCOUNTER
Pt seen in office today. He continually denied the need for the PPM and requested that it be turned off. Dr. Qamar Briseno changed settings to discontinue therapy with a 2 week trial. Pt reportedly passed out at the store a few hours later. Currently in the ED. I updated Dr. Qaamr Briseno. Per Dr. Qamar Briseno- no need to admit at this time. Needs to have his PPM reset to prior settings. I called Dr. Tete Winters in the ED to update on Dr. Vandana Connors rec. He is in agreement. I stated that I will call Sola Flores from [address] [place] [address] to have it completed. I called Sola Flores with [employer] and he states that he will head to [facility] THE ED to re-program settings- ETA is approximately 30 minutes from now.

## 2018-10-09 LAB
EKG ATRIAL RATE: 59 BPM
EKG DIAGNOSIS: NORMAL
EKG P AXIS: 80 DEGREES
EKG P-R INTERVAL: 272 MS
EKG Q-T INTERVAL: 438 MS
EKG QRS DURATION: 88 MS
EKG QTC CALCULATION (BAZETT): 433 MS
EKG R AXIS: -48 DEGREES
EKG T AXIS: 52 DEGREES
EKG VENTRICULAR RATE: 59 BPM

## 2018-12-11 ENCOUNTER — TELEPHONE (OUTPATIENT)
Dept: FAMILY MEDICINE CLINIC | Age: 73
End: 2018-12-11

## 2018-12-18 ENCOUNTER — NURSE ONLY (OUTPATIENT)
Dept: CARDIOLOGY CLINIC | Age: 73
End: 2018-12-18
Payer: MEDICARE

## 2018-12-18 DIAGNOSIS — Z95.0 PACEMAKER: ICD-10-CM

## 2018-12-18 DIAGNOSIS — I44.1 SYMPTOMATIC ADVANCED HEART BLOCK: ICD-10-CM

## 2018-12-18 PROCEDURE — 93296 REM INTERROG EVL PM/IDS: CPT | Performed by: INTERNAL MEDICINE

## 2018-12-18 PROCEDURE — 93294 REM INTERROG EVL PM/LDLS PM: CPT | Performed by: INTERNAL MEDICINE

## 2018-12-18 NOTE — PROGRESS NOTES
Biotronik remote transmission shows normal function. Thoracic impedance trend stable. No new episodes/arrhythmias noted. Dr. Christina Cheney to review interrogation. See interrogation/Paceart report for further details.

## 2018-12-18 NOTE — LETTER
[address] [facility] [phone]  [address] [facility],4Th Floor [phone]    Pacemaker/Defibrillator Clinic          12/18/18        [address] [id number]        Dear Misbah Terry    This letter is to inform you that we received the transmission from your monitor at home that checks your pacemaker and/or defibrillator, or implanted heart monitor. The next date your monitor will automatically transmit will be 3/26/19. Please do not send additional routine transmissions unless specifically requested. Your device and monitor are wireless and most transmit cellularly, but please periodically check your monitor is still plugged in to the electrical outlet. If you still use the telephone land line to send please ensure the connection to the phone javier is secure. This will help to ensure successful automatic transmissions in the future. Also, the monitor needs to be close to you while sleeping at night. Please be aware that the remote device transmission sites are periodically monitored only during regular business hours during which simultaneous in-office device clinics are being run. If your transmission requires attention, we will contact you as soon as possible. Thank you.             [facility]

## 2019-03-26 ENCOUNTER — NURSE ONLY (OUTPATIENT)
Dept: CARDIOLOGY CLINIC | Age: 74
End: 2019-03-26
Payer: MEDICARE

## 2019-03-26 DIAGNOSIS — Z95.0 PACEMAKER: Primary | ICD-10-CM

## 2019-03-26 DIAGNOSIS — I44.1 SYMPTOMATIC ADVANCED HEART BLOCK: ICD-10-CM

## 2019-03-26 PROCEDURE — 93294 REM INTERROG EVL PM/LDLS PM: CPT | Performed by: INTERNAL MEDICINE

## 2019-03-26 PROCEDURE — 93296 REM INTERROG EVL PM/IDS: CPT | Performed by: INTERNAL MEDICINE

## 2019-05-27 DIAGNOSIS — G89.29 CHRONIC RIGHT-SIDED LOW BACK PAIN WITHOUT SCIATICA: ICD-10-CM

## 2019-05-27 DIAGNOSIS — M54.50 CHRONIC RIGHT-SIDED LOW BACK PAIN WITHOUT SCIATICA: ICD-10-CM

## 2019-05-28 ENCOUNTER — OFFICE VISIT (OUTPATIENT)
Dept: FAMILY MEDICINE CLINIC | Age: 74
End: 2019-05-28
Payer: MEDICARE

## 2019-05-28 VITALS
DIASTOLIC BLOOD PRESSURE: 71 MMHG | SYSTOLIC BLOOD PRESSURE: 120 MMHG | WEIGHT: 168 LBS | BODY MASS INDEX: 23.52 KG/M2 | HEART RATE: 62 BPM | HEIGHT: 71 IN | OXYGEN SATURATION: 98 % | RESPIRATION RATE: 16 BRPM

## 2019-05-28 DIAGNOSIS — R93.89 ABNORMAL CHEST CT: ICD-10-CM

## 2019-05-28 DIAGNOSIS — E04.1 THYROID NODULE: ICD-10-CM

## 2019-05-28 DIAGNOSIS — R35.89 POLYURIA: Primary | ICD-10-CM

## 2019-05-28 DIAGNOSIS — Z95.0 PACEMAKER: ICD-10-CM

## 2019-05-28 DIAGNOSIS — I49.8 OTHER CARDIAC ARRHYTHMIA: ICD-10-CM

## 2019-05-28 DIAGNOSIS — R10.9 RIGHT FLANK PAIN: ICD-10-CM

## 2019-05-28 DIAGNOSIS — I44.1 SYMPTOMATIC ADVANCED HEART BLOCK: ICD-10-CM

## 2019-05-28 DIAGNOSIS — R73.9 ELEVATED BLOOD SUGAR: ICD-10-CM

## 2019-05-28 LAB
ALBUMIN SERPL-MCNC: 4.2 G/DL (ref 3.4–5)
ALP BLD-CCNC: 43 U/L (ref 40–129)
ALT SERPL-CCNC: 9 U/L (ref 10–40)
ANION GAP SERPL CALCULATED.3IONS-SCNC: 15 MMOL/L (ref 3–16)
AST SERPL-CCNC: 22 U/L (ref 15–37)
BILIRUB SERPL-MCNC: 0.4 MG/DL (ref 0–1)
BILIRUBIN DIRECT: <0.2 MG/DL (ref 0–0.3)
BILIRUBIN, INDIRECT: ABNORMAL MG/DL (ref 0–1)
BUN BLDV-MCNC: 22 MG/DL (ref 7–20)
CALCIUM SERPL-MCNC: 9.6 MG/DL (ref 8.3–10.6)
CHLORIDE BLD-SCNC: 106 MMOL/L (ref 99–110)
CO2: 21 MMOL/L (ref 21–32)
CREAT SERPL-MCNC: 1.5 MG/DL (ref 0.8–1.3)
GFR AFRICAN AMERICAN: 55
GFR NON-AFRICAN AMERICAN: 46
GLUCOSE BLD-MCNC: 72 MG/DL (ref 70–99)
HBA1C MFR BLD: 5.4 %
POTASSIUM SERPL-SCNC: 4.3 MMOL/L (ref 3.5–5.1)
SODIUM BLD-SCNC: 142 MMOL/L (ref 136–145)
TOTAL PROTEIN: 7.2 G/DL (ref 6.4–8.2)

## 2019-05-28 PROCEDURE — 83036 HEMOGLOBIN GLYCOSYLATED A1C: CPT | Performed by: INTERNAL MEDICINE

## 2019-05-28 PROCEDURE — 99214 OFFICE O/P EST MOD 30 MIN: CPT | Performed by: INTERNAL MEDICINE

## 2019-05-28 PROCEDURE — 36415 COLL VENOUS BLD VENIPUNCTURE: CPT | Performed by: INTERNAL MEDICINE

## 2019-05-28 ASSESSMENT — PATIENT HEALTH QUESTIONNAIRE - PHQ9
SUM OF ALL RESPONSES TO PHQ9 QUESTIONS 1 & 2: 0
SUM OF ALL RESPONSES TO PHQ QUESTIONS 1-9: 0
2. FEELING DOWN, DEPRESSED OR HOPELESS: 0
SUM OF ALL RESPONSES TO PHQ QUESTIONS 1-9: 0
1. LITTLE INTEREST OR PLEASURE IN DOING THINGS: 0

## 2019-05-28 NOTE — PATIENT INSTRUCTIONS
Call for CT abdomen and pelvis. Old scan did show a small cyst in the liver, area of decreased air in the lumg and probable thyroid nodule. They will compare. These are unlikely to be problematic but what to ensure no change.   I think your pain is most likely from your back

## 2019-05-28 NOTE — LETTER
[address] [place] [id number]  Phone: [phone]  Fax: [phone]    Calos Man MD        May 30, 2019    [address] [place] [address]      Dear Shari Ugarte:    I'm sorry I am unable to reach you. Your thyroid is normal. Liver and sugar are good. Elsa Po function is decreased. Increase your water intake. Call to schedule CT. Avoid nsaids. You may need to hold dye secondary to slight decrease in kidney function. If you have any questions or concerns, please don't hesitate to call.     Sincerely,        Calos Man MD

## 2019-05-28 NOTE — PROGRESS NOTES
HPI: Brenda Terry presents for right flank pain. History of recurrent back surgeries film with large anterior osteophytes in the L3 4 level and L2-3. History of L4 5 fusion. Notes over the last year and a half he has had intermittent pain in the right flank. Has getting worse the last month. Denies any lower extremity weakness. No radiculopathy, fevers chills cough and hemoptysis nausea vomiting or trauma. Knows of someone who had back pain which ended up being lung cancer and impending death. He has a 50 year history of smoking. CT of the chest 2014 with round atelectasis of the left lung base posterior basilar's segment pulmonary nodule and probably a liver cyst. Denies any fevers or chills. States that he is healthy. Does note some shortness of breath going up steps. Polyuria. Last sugar 170. Denies any knowledge of diabetes. No polydipsia. PRECIADO steps. History tremor. States this is better with Sinemet. States he has Parkinson's. Positive pacemaker. Arrhythmia    PMH:   Pacemaker, back surgery cervical laminectomy    MEDS carbidopa    Social history: Still working 50-pack-year tobacco history. Positive beer. Marijuana. Family history CVA    Review of systems back pain, sun damaged skin, no sunscreen. Shortness of breath going up steps. Arrhythmias without symptoms. Rare GE reflux. No bloody stools. Polyuria. Arthralgias.       Constitutional, ent, CV, respiratory, GI, , joint, skin, allergic and psychiatric ROS reviewed and negative except for above    Allergies   Allergen Reactions    Inderal [Propranolol] Other (See Comments)     Severe Chest Pains       Outpatient Medications Marked as Taking for the 5/28/19 encounter (Office Visit) with Sheron Hilliard MD   Medication Sig Dispense Refill    carbidopa-levodopa (SINEMET)  MG per tablet                Past Medical History:   Diagnosis Date    Advance directive discussed with patient     DNR - Has Living will    Allergic rhinitis     Arrhythmia     s/p pacemaker for heart block: Dr. Dalia Chinchilla 17    Benign essential tremor 2017    Brachial neuritis     Chronic back pain     Erectile dysfunction 2012    Hypertension 2012    Parkinson disease ([facility] [address]     Dr. Zeynep Pedraza    Symptomatic advanced heart block        Past Surgical History:   Procedure Laterality Date   [address]    ACDF    LUMBAR LAMINECTOMY  1964    with Boswell ankur             Family History   Problem Relation Age of Onset    Stroke Mother          80    Other Father          80 Viral Meningitis           Review of Systems          Objective     /71   Pulse (!) 45   Resp 16   Ht 5' 11\" (1.803 m)   Wt 168 lb (76.2 kg)   SpO2 98% Comment: Ra  BMI 23.43 kg/m²     @LASTSAO2(3)@    Wt Readings from Last 3 Encounters:   19 168 lb (76.2 kg)   10/08/18 169 lb 15.6 oz (77.1 kg)   10/08/18 166 lb (75.3 kg)       Physical Exam     NAD alert and cooperative ? hard of hearing  HEENT: No oral ulcers. Good upstroke of the carotids. Sun damaged skin on scalp. Lungs decreased breath sounds however clear. Good ARIK ratio. Occasionally ectopic beat. I did not detect a murmur. Distant heart sounds. Normal S1 and S2. No hepatosplenomegaly or epigastric tenderness. Abnormal chest wall anterior thorax inferior ribs bilaterally. No point tenderness. Positive low back pain SI tenderness. Multiple healed surgical scars. Good range of motion to hips. Sensation DTRs are diminished however equal. No lower extremity weakness.       Chemistry        Component Value Date/Time     10/08/2018 1331    K 4.0 10/08/2018 1331     10/08/2018 1331    CO2 24 10/08/2018 1331    BUN 18 10/08/2018 1331    CREATININE 1.1 10/08/2018 1331        Component Value Date/Time    CALCIUM 9.1 10/08/2018 1331    ALKPHOS 45 2017 1008    AST 22 2017 1008    ALT 14 2017 1008    BILITOT 0.4 2017 1008            Lab Results Component Value Date    WBC 6.0 10/08/2018    HGB 14.3 10/08/2018    HCT 43.5 10/08/2018    MCV 95.2 10/08/2018     10/08/2018     No results found for: LABA1C  No results found for: EAG  No results found for: LABA1C  No components found for: CHLPL  Lab Results   Component Value Date    TRIG 131 11/14/2017    TRIG 104 02/05/2012     Lab Results   Component Value Date    HDL 63 (H) 11/14/2017    HDL 48 02/05/2012     Lab Results   Component Value Date    LDLCALC 112 (H) 11/14/2017    LDLCALC 93 02/05/2012     Lab Results   Component Value Date    LABVLDL 26 11/14/2017    LABVLDL 21 02/05/2012       Old labs and records reviewed or requested  Discussed past lab and studies with patient     A1c 5.4. Urinalysis specific gravity greater than 10:30. 15 peak tones. No blood or sugar. Diagnosis Orders   1. Polyuria     2. Right flank pain  Basic Metabolic Panel    HEPATIC FUNCTION PANEL   3. Elevated blood sugar  POCT glycosylated hemoglobin (Hb A1C)   4. Abnormal chest CT  CT CHEST ABDOMEN PELVIS W CONTRAST   5. Thyroid nodule  TSH WITH REFLEX TO FT4   6. Pacemaker     7. Other cardiac arrhythmia         Right flank pain with history of back surgery hepatic cyst atelectasis  and significant tobacco history. We'll get CT of the chest and abdomen. Polyuria. No evidence of prediabetes or diabetes. History abnormal CT and thyroid nodule. Will have basic metabolic profile liver function and thyroid function test.      sun damaged skin. He declines sunscreen    Anticipate CT will not show aggressive abnormality. May need to have ultrasound of the thyroid if any changes. I anticipate this is musculoskeletal    Arrhythmia stable asymptomatic          No follow-ups on file. Diagnosis and treatment discussed.   Possible side effects of medication reviewed  Patients questions answered  Follow up understood  Pt aware if they are not contacted about any test results , this does not mean they are normal.  They should call

## 2019-05-29 LAB — TSH REFLEX FT4: 2.28 UIU/ML (ref 0.27–4.2)

## 2019-06-03 ENCOUNTER — HOSPITAL ENCOUNTER (OUTPATIENT)
Dept: CT IMAGING | Age: 74
Discharge: HOME | End: 2019-06-03
Payer: MEDICARE

## 2019-06-03 DIAGNOSIS — R93.89 ABNORMAL CHEST CT: ICD-10-CM

## 2019-06-03 PROCEDURE — 74177 CT ABD & PELVIS W/CONTRAST: CPT

## 2019-06-03 PROCEDURE — 6360000004 HC RX CONTRAST MEDICATION: Performed by: INTERNAL MEDICINE

## 2019-06-03 RX ADMIN — IOPAMIDOL 75 ML: 755 INJECTION, SOLUTION INTRAVENOUS at 07:33

## 2019-06-03 RX ADMIN — IOHEXOL 50 ML: 240 INJECTION, SOLUTION INTRATHECAL; INTRAVASCULAR; INTRAVENOUS; ORAL at 07:33

## 2019-06-05 ENCOUNTER — TELEPHONE (OUTPATIENT)
Dept: FAMILY MEDICINE CLINIC | Age: 74
End: 2019-06-05

## 2019-06-10 ENCOUNTER — TELEPHONE (OUTPATIENT)
Dept: FAMILY MEDICINE CLINIC | Age: 74
End: 2019-06-10

## 2019-07-02 ENCOUNTER — NURSE ONLY (OUTPATIENT)
Dept: CARDIOLOGY CLINIC | Age: 74
End: 2019-07-02
Payer: MEDICARE

## 2019-07-02 DIAGNOSIS — I44.1 SYMPTOMATIC ADVANCED HEART BLOCK: ICD-10-CM

## 2019-07-02 DIAGNOSIS — Z95.0 PACEMAKER: ICD-10-CM

## 2019-07-02 PROCEDURE — 93294 REM INTERROG EVL PM/LDLS PM: CPT | Performed by: INTERNAL MEDICINE

## 2019-07-02 PROCEDURE — 93296 REM INTERROG EVL PM/IDS: CPT | Performed by: INTERNAL MEDICINE

## 2019-07-03 NOTE — PROGRESS NOTES
Remote/Biotronik interrogation shows normal device function. No new episodes/arrhythmias recorded. Thoracic impedance appears stable but hard to determine d/t intermittent transmissions. Dr. Walters Anis to review interrogation. See interrogation/Paceart report for further details.

## 2019-09-30 ENCOUNTER — OFFICE VISIT (OUTPATIENT)
Dept: CARDIOLOGY CLINIC | Age: 74
End: 2019-09-30
Payer: MEDICARE

## 2019-09-30 ENCOUNTER — NURSE ONLY (OUTPATIENT)
Dept: CARDIOLOGY CLINIC | Age: 74
End: 2019-09-30
Payer: MEDICARE

## 2019-09-30 VITALS
DIASTOLIC BLOOD PRESSURE: 72 MMHG | OXYGEN SATURATION: 97 % | WEIGHT: 170 LBS | HEIGHT: 71 IN | BODY MASS INDEX: 23.8 KG/M2 | HEART RATE: 46 BPM | SYSTOLIC BLOOD PRESSURE: 122 MMHG

## 2019-09-30 DIAGNOSIS — I44.1 SYMPTOMATIC ADVANCED HEART BLOCK: Primary | ICD-10-CM

## 2019-09-30 DIAGNOSIS — Z95.0 PACEMAKER: ICD-10-CM

## 2019-09-30 DIAGNOSIS — I44.1 SYMPTOMATIC ADVANCED HEART BLOCK: ICD-10-CM

## 2019-09-30 PROCEDURE — 99214 OFFICE O/P EST MOD 30 MIN: CPT | Performed by: INTERNAL MEDICINE

## 2019-09-30 PROCEDURE — 93280 PM DEVICE PROGR EVAL DUAL: CPT | Performed by: INTERNAL MEDICINE

## 2019-09-30 NOTE — LETTER
[address]   Cardiac Electrophysiology Consultation   Date: 9/30/2019  Reason for Consultation: Device Management  Consult Requesting Physician: Laura Fairbanks MD    Chief Complaint:   Chief Complaint   Patient presents with    Follow-up     cardiac arrhythmia - 2018    Loss of Consciousness     device check        HPI: Bishop Terry is a 75 y.o. with a  PMH significant for HTN who was initially seen in consultation on 10/31/16 for bradycardia. He had a long standing history of asymptomatic bradycardia that was not limiting his activity. It was decided to defer a PPM and montor-- however, one year later he reported progressive symptoms and decided to undergo Biotronik 2-chamber PPM implant 7/2017. Interval History: Today, he arrives for a follow up for device management. He reports he is feeling well with the current setting of his pacemaker at DDDR. He states the when his device was changed to VVI rate of 30 bpm he became lightheaded and passed out, thereby requiring a re-programming of the device to the current setting of DDDR. He is compliant with his medications and tolerating them well. He denies chest pain/pressure, tightness, edema, shortness of breath, heart racing, palpitations, lightheadedness, dizziness, syncope, presyncope,  PND or orthopnea. Past Medical History:   Diagnosis Date    Advance directive discussed with patient     DNR - Has Living will    Allergic rhinitis     Arrhythmia     s/p pacemaker for heart block: Dr. Leon Anchors 7/31/17    Benign essential tremor 2/27/2017    Brachial neuritis     Chronic back pain     Erectile dysfunction 2/13/2012    Hypertension 2/13/2012    Parkinson disease ([facility] [address]     Dr. Jose Keller Symptomatic advanced heart block         Past Surgical History:   Procedure Laterality Date   [address]    ACDF   [address]    with Boswell ankur       Allergies:   Allergies   Allergen Reactions

## 2019-09-30 NOTE — PROGRESS NOTES
[address]   Cardiac Electrophysiology Consultation   Date: 9/30/2019  Reason for Consultation: Device Management  Consult Requesting Physician: Vadim Martinez MD    Chief Complaint:   Chief Complaint   Patient presents with    Follow-up     cardiac arrhythmia - 2018    Loss of Consciousness     device check        HPI: Lars Terry is a 75 y.o. with a  PMH significant for HTN who was initially seen in consultation on 10/31/16 for bradycardia. He had a long standing history of asymptomatic bradycardia that was not limiting his activity. It was decided to defer a PPM and montor-- however, one year later he reported progressive symptoms and decided to undergo Biotronik 2-chamber PPM implant 7/2017. Interval History: Today, he arrives for a follow up for device management. He reports he is feeling well with the current setting of his pacemaker at DDDR. He states the when his device was changed to VVI rate of 30 bpm he became lightheaded and passed out, thereby requiring a re-programming of the device to the current setting of DDDR. He is compliant with his medications and tolerating them well. He denies chest pain/pressure, tightness, edema, shortness of breath, heart racing, palpitations, lightheadedness, dizziness, syncope, presyncope,  PND or orthopnea. Past Medical History:   Diagnosis Date    Advance directive discussed with patient     DNR - Has Living will    Allergic rhinitis     Arrhythmia     s/p pacemaker for heart block: Dr. Anita Aaron 7/31/17    Benign essential tremor 2/27/2017    Brachial neuritis     Chronic back pain     Erectile dysfunction 2/13/2012    Hypertension 2/13/2012    Parkinson disease ([facility] [address]     Dr. aKy Matthews Symptomatic advanced heart block         Past Surgical History:   Procedure Laterality Date   [address]    ACDF   [address] 89    with Boswell ankur       Allergies:   Allergies   Allergen Reactions    Inderal · Head: Normocephalic and atraumatic. · Mouth/Throat: Oropharynx is clear and moist.   · Eyes: Conjunctivae normal. EOM are normal.   · Neck: Normal range of motion. Neck supple. No rigidity. No JVD present. · Cardiovascular: Paced rate, paced rhythm, S1&S2 and intact distal pulses. · Pulmonary/Chest: Bilateral respiratory sounds. No wheezes. No rhonchi. · Abdominal: Soft. Bowel sounds present. No distension, No tenderness. · Musculoskeletal: No tenderness. No edema    · Lymphadenopathy: Has no cervical adenopathy. · Neurological: Alert and oriented. Cranial nerve appears intact, No Gross deficit   · Skin: Skin is warm and dry. No rash noted. +LCW device  · Psychiatric: Has a normal mood, affect and behavior     Labs:  Reviewed. EC19 reviewed, sinus rhythm. occasional paced atrial complexes, native QRS conduction; occasional paced ventricular complex occasional fusion complexes. Other Non-Invasive Studies:   1. Event monitor:   None    2. Echo: 2012  Normal study. LVEF 60%    3. Stress Test:   Normal myocardial perfusion study.    Normal myocardial perfusion.    Normal LV size and systolic function.    Overall findings represent a low risk study.         4. Cath:   None     Procedures:  1. Biotronik dual chamber PPM implant on 17 with Dr. Menendez Ear:   Patient Active Problem List    Diagnosis Date Noted    Parkinson disease [facility])     Pacemaker 2017    Symptomatic advanced heart block     Benign essential tremor 2017    Peyronie's disease 2014    Scapular dysfunction 2014    Actinic keratoses 2013    Back pain 10/24/2012    Neuropathic pain of foot 10/24/2012    Erectile dysfunction 2012    Arrhythmia     Allergic rhinitis         Plan:    Bradycardia with premature junctional beats, PVC's and blocked PAC's:  -S/p Biotronik PPM implant 17  -Device interrogated  today and functioning appropriately.    -AP 59%,

## 2019-10-01 ENCOUNTER — OFFICE VISIT (OUTPATIENT)
Dept: FAMILY MEDICINE CLINIC | Age: 74
End: 2019-10-01
Payer: MEDICARE

## 2019-10-01 VITALS
OXYGEN SATURATION: 99 % | SYSTOLIC BLOOD PRESSURE: 122 MMHG | DIASTOLIC BLOOD PRESSURE: 75 MMHG | WEIGHT: 172 LBS | RESPIRATION RATE: 16 BRPM | HEIGHT: 71 IN | BODY MASS INDEX: 24.08 KG/M2 | HEART RATE: 71 BPM

## 2019-10-01 DIAGNOSIS — E78.2 MIXED HYPERLIPIDEMIA: ICD-10-CM

## 2019-10-01 DIAGNOSIS — Z23 NEED FOR INFLUENZA VACCINATION: ICD-10-CM

## 2019-10-01 DIAGNOSIS — M54.50 CHRONIC RIGHT-SIDED LOW BACK PAIN WITHOUT SCIATICA: Primary | ICD-10-CM

## 2019-10-01 DIAGNOSIS — Z87.891 HISTORY OF TOBACCO ABUSE: ICD-10-CM

## 2019-10-01 DIAGNOSIS — Z95.0 PACEMAKER: ICD-10-CM

## 2019-10-01 DIAGNOSIS — R79.89 ELEVATED SERUM CREATININE: ICD-10-CM

## 2019-10-01 DIAGNOSIS — J30.2 SEASONAL ALLERGIC RHINITIS, UNSPECIFIED TRIGGER: ICD-10-CM

## 2019-10-01 DIAGNOSIS — G89.29 CHRONIC RIGHT-SIDED LOW BACK PAIN WITHOUT SCIATICA: Primary | ICD-10-CM

## 2019-10-01 DIAGNOSIS — I44.1 SYMPTOMATIC ADVANCED HEART BLOCK: ICD-10-CM

## 2019-10-01 DIAGNOSIS — Z12.11 SCREEN FOR COLON CANCER: ICD-10-CM

## 2019-10-01 LAB
A/G RATIO: 1.5 (ref 1.1–2.2)
ALBUMIN SERPL-MCNC: 4.1 G/DL (ref 3.4–5)
ALP BLD-CCNC: 42 U/L (ref 40–129)
ALT SERPL-CCNC: 10 U/L (ref 10–40)
ANION GAP SERPL CALCULATED.3IONS-SCNC: 12 MMOL/L (ref 3–16)
AST SERPL-CCNC: 21 U/L (ref 15–37)
BILIRUB SERPL-MCNC: <0.2 MG/DL (ref 0–1)
BILIRUBIN URINE: NEGATIVE
BLOOD, URINE: NEGATIVE
BUN BLDV-MCNC: 18 MG/DL (ref 7–20)
CALCIUM SERPL-MCNC: 9.5 MG/DL (ref 8.3–10.6)
CHLORIDE BLD-SCNC: 105 MMOL/L (ref 99–110)
CHOLESTEROL, TOTAL: 189 MG/DL (ref 0–199)
CLARITY: CLEAR
CO2: 27 MMOL/L (ref 21–32)
COLOR: YELLOW
CREAT SERPL-MCNC: 1.2 MG/DL (ref 0.8–1.3)
EPITHELIAL CELLS, UA: 0 /HPF (ref 0–5)
GFR AFRICAN AMERICAN: >60
GFR NON-AFRICAN AMERICAN: 59
GLOBULIN: 2.7 G/DL
GLUCOSE BLD-MCNC: 83 MG/DL (ref 70–99)
GLUCOSE URINE: NEGATIVE MG/DL
HCT VFR BLD CALC: 42 % (ref 40.5–52.5)
HDLC SERPL-MCNC: 71 MG/DL (ref 40–60)
HEMOGLOBIN: 13.9 G/DL (ref 13.5–17.5)
HYALINE CASTS: 0 /LPF (ref 0–8)
KETONES, URINE: NEGATIVE MG/DL
LDL CHOLESTEROL CALCULATED: 103 MG/DL
LEUKOCYTE ESTERASE, URINE: NEGATIVE
MCH RBC QN AUTO: 31.5 PG (ref 26–34)
MCHC RBC AUTO-ENTMCNC: 33 G/DL (ref 31–36)
MCV RBC AUTO: 95.5 FL (ref 80–100)
MICROSCOPIC EXAMINATION: NORMAL
NITRITE, URINE: NEGATIVE
PDW BLD-RTO: 12.8 % (ref 12.4–15.4)
PH UA: 5.5 (ref 5–8)
PLATELET # BLD: 234 K/UL (ref 135–450)
PMV BLD AUTO: 9.1 FL (ref 5–10.5)
POTASSIUM SERPL-SCNC: 4.3 MMOL/L (ref 3.5–5.1)
PROTEIN UA: NEGATIVE MG/DL
RBC # BLD: 4.4 M/UL (ref 4.2–5.9)
RBC UA: 3 /HPF (ref 0–4)
SODIUM BLD-SCNC: 144 MMOL/L (ref 136–145)
SPECIFIC GRAVITY UA: 1.03 (ref 1–1.03)
TOTAL PROTEIN: 6.8 G/DL (ref 6.4–8.2)
TRIGL SERPL-MCNC: 73 MG/DL (ref 0–150)
URINE TYPE: NORMAL
UROBILINOGEN, URINE: 0.2 E.U./DL
VLDLC SERPL CALC-MCNC: 15 MG/DL
WBC # BLD: 6.1 K/UL (ref 4–11)
WBC UA: 1 /HPF (ref 0–5)

## 2019-10-01 PROCEDURE — 36415 COLL VENOUS BLD VENIPUNCTURE: CPT | Performed by: INTERNAL MEDICINE

## 2019-10-01 PROCEDURE — 99214 OFFICE O/P EST MOD 30 MIN: CPT | Performed by: INTERNAL MEDICINE

## 2019-10-01 PROCEDURE — 90653 IIV ADJUVANT VACCINE IM: CPT | Performed by: INTERNAL MEDICINE

## 2019-10-01 PROCEDURE — G0008 ADMIN INFLUENZA VIRUS VAC: HCPCS | Performed by: INTERNAL MEDICINE

## 2019-10-01 RX ORDER — CARBIDOPA AND LEVODOPA 50; 200 MG/1; MG/1
1 TABLET, EXTENDED RELEASE ORAL 3 TIMES DAILY
COMMUNITY

## 2019-10-01 RX ORDER — CARBIDOPA AND LEVODOPA 25; 100 MG/1; MG/1
TABLET, ORALLY DISINTEGRATING ORAL
COMMUNITY
End: 2019-10-01 | Stop reason: CLARIF

## 2020-01-07 ENCOUNTER — NURSE ONLY (OUTPATIENT)
Dept: CARDIOLOGY CLINIC | Age: 75
End: 2020-01-07
Payer: MEDICARE

## 2020-01-07 PROCEDURE — 93294 REM INTERROG EVL PM/LDLS PM: CPT | Performed by: INTERNAL MEDICINE

## 2020-01-07 PROCEDURE — 93296 REM INTERROG EVL PM/IDS: CPT | Performed by: INTERNAL MEDICINE

## 2020-01-07 NOTE — LETTER
[address] [place] [phone]  [address] [place] [address]  [address] [phone]    Pacemaker/Defibrillator Clinic          01/07/20        [address] [place] [id number]        Dear Chad Tripathi Day    This letter is to inform you that we received the transmission from your monitor at home that checks your implanted heart device. The next date your monitor will automatically transmit will be 4/8/20. If your report needs attention we will notify you. Your device and monitor are wireless and most transmit cellularly, but please periodically check your monitor is still plugged in to the electrical outlet. If you still use the telephone land line to send please ensure the connection to the phone javier is secure. This will help to ensure successful automatic transmissions in the future. Also, the monitor needs to be close to you while sleeping at night. Please be aware that the remote device transmission sites are periodically monitored only during regular business hours during which simultaneous in-office device clinics are being run. If your transmission requires attention, we will contact you as soon as possible. Thank you.             [facility]

## 2020-01-07 NOTE — PROGRESS NOTES
Remote/Biotronik pacemaker interrogation shows normal device function. Thoracic impedance is stable. No new episodes/arrhythmias recorded. Dr. Bruno Erica to review interrogation. See interrogation/Paceart report for further details.

## 2020-03-02 ENCOUNTER — TELEPHONE (OUTPATIENT)
Dept: FAMILY MEDICINE CLINIC | Age: 75
End: 2020-03-02

## 2020-03-02 NOTE — TELEPHONE ENCOUNTER
Pt calling back. States saw a doctor in [facility], that is where he is until the end of April. Wanting to see a surgeon when he gets home to have them cut out. Please advise.  [phone]

## 2020-04-08 ENCOUNTER — NURSE ONLY (OUTPATIENT)
Dept: CARDIOLOGY CLINIC | Age: 75
End: 2020-04-08
Payer: MEDICARE

## 2020-04-08 PROCEDURE — 93296 REM INTERROG EVL PM/IDS: CPT | Performed by: INTERNAL MEDICINE

## 2020-04-08 PROCEDURE — 93294 REM INTERROG EVL PM/LDLS PM: CPT | Performed by: INTERNAL MEDICINE

## 2020-07-08 ENCOUNTER — NURSE ONLY (OUTPATIENT)
Dept: CARDIOLOGY CLINIC | Age: 75
End: 2020-07-08
Payer: MEDICARE

## 2020-07-08 PROCEDURE — 93296 REM INTERROG EVL PM/IDS: CPT | Performed by: INTERNAL MEDICINE

## 2020-07-08 PROCEDURE — 93294 REM INTERROG EVL PM/LDLS PM: CPT | Performed by: INTERNAL MEDICINE

## 2020-07-08 NOTE — PROGRESS NOTES
Biotronik transmission shows normal sensing and pacing function. See interrogation for more details.

## 2020-07-08 NOTE — LETTER
[address] [facility] [phone]  [address] [facility] [phone]    Pacemaker/Defibrillator Clinic          07/08/20        [address] [facility] [id number]        Dear Hanna Terry    This letter is to inform you that we received the transmission from your monitor at home that checks your implanted heart device. The next date your monitor will automatically transmit will be 12-29-20. If your report needs attention we will notify you. Your device and monitor are wireless and most transmit cellularly, but please periodically check your monitor is still plugged in to the electrical outlet. If you still use the telephone land line to send please ensure the connection to the phone javier is secure. This will help to ensure successful automatic transmissions in the future. Also, the monitor needs to be close to you while sleeping at night. Please be aware that the remote device transmission sites are periodically monitored only during regular business hours during which simultaneous in-office device clinics are being run. If your transmission requires attention, we will contact you as soon as possible. Thank you.             [address]

## 2020-09-21 ENCOUNTER — OFFICE VISIT (OUTPATIENT)
Dept: FAMILY MEDICINE CLINIC | Age: 75
End: 2020-09-21
Payer: MEDICARE

## 2020-09-21 VITALS
DIASTOLIC BLOOD PRESSURE: 94 MMHG | WEIGHT: 167 LBS | BODY MASS INDEX: 23.38 KG/M2 | TEMPERATURE: 97.2 F | HEIGHT: 71 IN | SYSTOLIC BLOOD PRESSURE: 130 MMHG | RESPIRATION RATE: 16 BRPM

## 2020-09-21 PROCEDURE — 99213 OFFICE O/P EST LOW 20 MIN: CPT | Performed by: INTERNAL MEDICINE

## 2020-09-21 PROCEDURE — G8510 SCR DEP NEG, NO PLAN REQD: HCPCS | Performed by: INTERNAL MEDICINE

## 2020-09-21 PROCEDURE — 3288F FALL RISK ASSESSMENT DOCD: CPT | Performed by: INTERNAL MEDICINE

## 2020-09-21 PROCEDURE — 90694 VACC AIIV4 NO PRSRV 0.5ML IM: CPT | Performed by: INTERNAL MEDICINE

## 2020-09-21 PROCEDURE — G0008 ADMIN INFLUENZA VIRUS VAC: HCPCS | Performed by: INTERNAL MEDICINE

## 2020-09-21 ASSESSMENT — PATIENT HEALTH QUESTIONNAIRE - PHQ9
SUM OF ALL RESPONSES TO PHQ QUESTIONS 1-9: 0
SUM OF ALL RESPONSES TO PHQ9 QUESTIONS 1 & 2: 0
SUM OF ALL RESPONSES TO PHQ QUESTIONS 1-9: 0
2. FEELING DOWN, DEPRESSED OR HOPELESS: 0
1. LITTLE INTEREST OR PLEASURE IN DOING THINGS: 0

## 2020-09-21 NOTE — PATIENT INSTRUCTIONS
Patient Education        Shingles: Care Instructions  Your Care Instructions     Shingles (herpes zoster) causes pain and a blistered rash. The rash can appear anywhere on the body but will be on only one side of the body, the left or right. It will be in a band, a strip, or a small area. The pain can be very severe. Shingles can also cause tingling or itching in the area of the rash. The blisters scab over after a few days and heal in 2 to 4 weeks. Medicines can help you feel better and may help prevent more serious problems caused by shingles. Shingles is caused by the same virus that causes chickenpox. When you have chickenpox, the virus gets into your nerve roots and stays there (becomes dormant) long after you get over the chickenpox. If the virus becomes active again, it can cause shingles. Follow-up care is a key part of your treatment and safety. Be sure to make and go to all appointments, and call your doctor if you are having problems. It's also a good idea to know your test results and keep a list of the medicines you take. How can you care for yourself at home? · Be safe with medicines. Take your medicines exactly as prescribed. Call your doctor if you think you are having a problem with your medicine. Antiviral medicine helps you get better faster. · Try not to scratch or pick at the blisters. They will crust over and fall off on their own if you leave them alone. · Put cool, wet cloths on the area to relieve pain and itching. You can also use calamine lotion. Try not to use so much lotion that it cakes and is hard to get off. · Put cornstarch or baking soda on the sores to help dry them out so they heal faster. · Do not use thick ointment, such as petroleum jelly, on the sores. This will keep them from drying and healing. · To help remove loose crusts, soak them in tap water. This can help decrease oozing, and dry and soothe the skin.   · Take an over-the-counter pain medicine, such as acetaminophen (Tylenol), ibuprofen (Advil, Motrin), or naproxen (Aleve). Read and follow all instructions on the label. · Avoid close contact with people until the blisters have healed. It is very important for you to avoid contact with anyone who has never had chickenpox or the chickenpox vaccine. Pregnant women, young babies, and anyone else who has a hard time fighting infection (such as someone with HIV, diabetes, or cancer) is especially at risk. When should you call for help? Call your doctor now or seek immediate medical care if:  · You have a new or higher fever. · You have a severe headache and a stiff neck. · You lose the ability to think clearly. · The rash spreads to your forehead, nose, eyes, or eyelids. · You have eye pain, or your vision gets worse. · You have new pain in your face, or you cannot move the muscles in your face. · Blisters spread to new parts of your body. Watch closely for changes in your health, and be sure to contact your doctor if:  · The rash has not healed after 2 to 4 weeks. · You still have pain after the rash has healed. Where can you learn more? Go to https://Hedge Communitypepiceweb.[employer]id[employer]. org and sign in to your [employer] account. Maureen Rivas in the [facility] box to learn more about \"Shingles: Care Instructions. \"     If you do not have an account, please click on the \"Sign Up Now\" link. Current as of: February 11, 2020               Content Version: 12.5  © 2006-2020 Healthwise, Incorporated. Care instructions adapted under license by [facility] ([facility]). If you have questions about a medical condition or this instruction, always ask your healthcare professional. [place] [address] any warranty or liability for your use of this information. Patient Education        Well Visit, Over 72: Care Instructions  Your Care Instructions     Physical exams can help you stay healthy.  Your doctor has checked your overall health and may have your doctor whether you should have tests for diabetes. · Vision. Experts recommend that you have yearly exams for glaucoma and other age-related eye problems. · Hearing. Tell your doctor if you notice any change in your hearing. You can have tests to find out how well you hear. · Colon cancer tests. Keep having colon cancer tests as your doctor recommends. You can have one of several types of tests. · Heart attack and stroke risk. At least every 4 to 6 years, you should have your risk for heart attack and stroke assessed. Your doctor uses factors such as your age, blood pressure, cholesterol, and whether you smoke or have diabetes to show what your risk for a heart attack or stroke is over the next 10 years. · Osteoporosis. Talk to your doctor about whether you should have a bone density test to find out whether you have thinning bones. Ask your doctor if you need to take a calcium plus vitamin D supplement. You may be able to get enough calcium and vitamin D through your diet. For women  · Pap test and pelvic exam. You may no longer need a Pap test. Talk with your doctor about whether to stop or continue to have Pap tests. · Breast exam and mammogram. Ask how often you should have a mammogram, which is an X-ray of your breasts. A mammogram can spot breast cancer before it can be felt and when it is easiest to treat. · Thyroid disease. Talk to your doctor about whether to have your thyroid checked as part of a regular physical exam. Women have an increased chance of a thyroid problem. For men  · Prostate exam. Talk to your doctor about whether you should have a blood test (called a PSA test) for prostate cancer. Experts recommend that you discuss the benefits and risks of the test with your doctor before you decide whether to have this test. Some experts say that men ages 79 and older no longer need testing. · Abdominal aortic aneurysm.  Ask your doctor whether you should have a test to check for an aneurysm. You may need a test if you ever smoked or if your parent, brother, sister, or child has had an aneurysm. When should you call for help? Watch closely for changes in your health, and be sure to contact your doctor if you have any problems or symptoms that concern you. Where can you learn more? Go to https://chpepiceweb.[employer] org and sign in to your [employer] account. Enter D022 in the [employer] box to learn more about \"Well Visit, Over 65: Care Instructions. \"     If you do not have an account, please click on the \"Sign Up Now\" link. Current as of: August 22, 2019               Content Version: 12.5  © 9716-8353 Healthwise, Incorporated. Care instructions adapted under license by [facility] ([facility]). If you have questions about a medical condition or this instruction, always ask your healthcare professional. [address] any warranty or liability for your use of this information.

## 2020-09-21 NOTE — PROGRESS NOTES
HPI: Wilfredo Terry presents for vaccine. [employer] [facility].     Health issues include osteoarthritis, tobacco history, tremor question Parkinson's, pacemaker,     History of recurrent back surgeries film with large anterior osteophytes in the L3 4 level and L2-3. History of L4 5 fusion. Notes over the last year and a half he has had intermittent pain in the right flank. States no change.      He has a 50 year history of smoking. CT of the chest 2019 with stable pulm nodules and left lung base atelectasis. inceidental findings of stable thyroid cyst DJD liver hemangiomas, diverticulosis and atherosclerosis aorta without aneurysm. No statin. No increased shortness of breath wheeze or hemoptysis.       History tremor. States this is better with Sinemet. States he has Parkinson's. Follows with neurologist in [facility]    Positive pacemaker. DDDR mode. follows Dr Phill Musa. EF 0 % 2012 non ischemic thallium     Specialist    Neurology     ortho     PMH:   Pacemaker, back surgery cervical laminectomy     MEDS carbidopa 50/200 3 times daily.     Social history: Still working instruction. 50-pack-year tobacco history. Positive beer 8 beers weekly . Marijuana. No exercise outside of work . [place] in the winter. [address]. Has a girlfriend.   .  Family history CVA     Review of systems back pain, sun damaged skin, no sunscreen. Shortness of breath going up steps. Arrhythmias without symptoms. Rare GE reflux. No bloody stools. Polyuria. Arthralgias. Denies any cognitive use. No wheezing shortness of breath GE reflux. Has hard stools.   No difficulty with urine stream.      Constitutional, ent, CV, respiratory, GI, , joint, skin, allergic and psychiatric ROS reviewed and negative except for above    Allergies   Allergen Reactions    Inderal [Propranolol] Other (See Comments)     Severe Chest Pains       Outpatient Medications Marked as Taking for the 9/21/20 encounter (Office Visit) with Atiya Howard Gina Omalley MD   Medication Sig Dispense Refill    carbidopa-levodopa (SINEMET CR)  MG per extended release tablet Take 1 tablet by mouth 3 times daily               Past Medical History:   Diagnosis Date    Advance directive discussed with patient     DNR - Has Living will    Allergic rhinitis     Arrhythmia     s/p pacemaker for heart block: Dr. Geralynn Gosselin 17    Benign essential tremor 2017    Brachial neuritis     Chronic back pain     Erectile dysfunction 2012    History of tobacco abuse 10/1/2019    > 50 pack year    Hypertension 2012    Parkinson disease ([facility] [address]     Dr. Jensen    Symptomatic advanced heart block        Past Surgical History:   Procedure Laterality Date   [address]    ACDF    LUMBAR LAMINECTOMY  1964    with Boswell ankru             Family History   Problem Relation Age of Onset    Stroke Mother          80    Other Father          80 Viral Meningitis           Review of Systems      Objective     BP (!) 130/94   Temp 97.2 °F (36.2 °C)   Resp 16   Ht 5' 11\" (1.803 m)   Wt 167 lb (75.8 kg)   BMI 23.29 kg/m²     @LASTSAO2(3)@    Wt Readings from Last 3 Encounters:   10/01/19 172 lb (78 kg)   19 170 lb (77.1 kg)   19 168 lb (76.2 kg)       Physical Exam     NAD alert and cooperative  HEENT: Throat is clear. Scar left tympanic membrane. Good upstroke of the carotids. Decreased breath sounds. No wheezes rales or rhonchi. Cardiovascular exam regular rate and rhythm. I detect no murmur today. Abdomen is benign no hepatosplenomegaly or epigastric tenderness. Good pulses lower extremities. No suspicious skin lesions or nodules. Darkened skin.       Chemistry        Component Value Date/Time     10/01/2019 0826    K 4.3 10/01/2019 0826     10/01/2019 0826    CO2 27 10/01/2019 0826    BUN 18 10/01/2019 0826    CREATININE 1.2 10/01/2019 0826        Component Value Date/Time    CALCIUM 9.5 10/01/2019 0826 ALKPHOS 42 10/01/2019 0826    AST 21 10/01/2019 0826    ALT 10 10/01/2019 0826    BILITOT <0.2 10/01/2019 0826            Lab Results   Component Value Date    WBC 6.1 10/01/2019    HGB 13.9 10/01/2019    HCT 42.0 10/01/2019    MCV 95.5 10/01/2019     10/01/2019     Lab Results   Component Value Date    LABA1C 5.4 05/28/2019     No results found for: EAG  Lab Results   Component Value Date    LABA1C 5.4 05/28/2019     No components found for: CHLPL  Lab Results   Component Value Date    TRIG 73 10/01/2019    TRIG 131 11/14/2017    TRIG 104 02/05/2012     Lab Results   Component Value Date    HDL 71 (H) 10/01/2019    HDL 63 (H) 11/14/2017    HDL 48 02/05/2012     Lab Results   Component Value Date    LDLCALC 103 (H) 10/01/2019    LDLCALC 112 (H) 11/14/2017    LDLCALC 93 02/05/2012     Lab Results   Component Value Date    LABVLDL 15 10/01/2019    LABVLDL 26 11/14/2017    LABVLDL 21 02/05/2012       Old labs and records reviewed or requested  Discussed past lab and studies with patient      Diagnosis Orders   1. Other cardiac arrhythmia     2. History of tobacco abuse     3. Parkinson disease ([facility] [address]     4. Flu vaccine need     5. Elevated blood pressure reading         Discussed blood pressure. Does not want to come back to have that checked here. Will do so in [place]. No chest pain palpitations or exercise intolerance. Cardiac arrhythmia pacemaker following with cardiology in the next couple of weeks. History tobacco abuse with scarring no shortness of breath. Osteoarthritis stable. No follow-ups on file. Diagnosis and treatment discussed.   Possible side effects of medication reviewed  Patients questions answered  Follow up understood  Pt aware if they are not contacted about any test results , this does not mean they are normal.  They should call

## 2020-09-28 PROBLEM — R00.1 SYMPTOMATIC BRADYCARDIA: Status: ACTIVE | Noted: 2020-09-28

## 2020-09-28 NOTE — PROGRESS NOTES
using smokeless tobacco. He reports current alcohol use of about 15.0 standard drinks of alcohol per week. Family History:      Problem Relation Age of Onset    Stroke Mother          80    Other Father          80 Viral Meningitis       Review of Systems   Constitutional: Negative for chills, fatigue, fever and unexpected weight change. HENT: Negative for congestion, hearing loss, sinus pressure, sore throat and trouble swallowing. Respiratory: Negative for cough, shortness of breath and wheezing. Cardiovascular: Negative for chest pain, palpitations and leg swelling. Gastrointestinal: Negative for abdominal pain, blood in stool, constipation, diarrhea, nausea and vomiting. Genitourinary: Negative for hematuria. Musculoskeletal: Negative for arthralgias, back pain, gait problem and myalgias. Skin: Negative for color change, rash and wound. Neurological: Positive for light-headedness. Negative for dizziness, seizures, syncope, speech difficulty and weakness. Hematological: Does not bruise/bleed easily. Physical Examination:  Vitals:    20 0832   BP: 118/70   Pulse: 60   Temp: 97.5 °F (36.4 °C)   SpO2: 98%      Wt Readings from Last 3 Encounters:   20 169 lb (76.7 kg)   20 167 lb (75.8 kg)   10/01/19 172 lb (78 kg)       Physical Exam  Vitals signs reviewed. Constitutional:       General: He is not in acute distress. Appearance: Normal appearance. He is normal weight. HENT:      Head: Normocephalic and atraumatic. Nose: Nose normal.      Mouth/Throat:      Mouth: Mucous membranes are moist.   Eyes:      Conjunctiva/sclera: Conjunctivae normal.      Pupils: Pupils are equal, round, and reactive to light. Cardiovascular:      Heart sounds: No murmur. No friction rub. No gallop. Pulmonary:      Effort: No respiratory distress. Breath sounds: No wheezing, rhonchi or rales. Abdominal:      General: Abdomen is flat.  Bowel sounds are normal.

## 2020-09-29 ENCOUNTER — NURSE ONLY (OUTPATIENT)
Dept: CARDIOLOGY CLINIC | Age: 75
End: 2020-09-29
Payer: MEDICARE

## 2020-09-29 ENCOUNTER — OFFICE VISIT (OUTPATIENT)
Dept: CARDIOLOGY CLINIC | Age: 75
End: 2020-09-29
Payer: MEDICARE

## 2020-09-29 VITALS
BODY MASS INDEX: 23.66 KG/M2 | DIASTOLIC BLOOD PRESSURE: 70 MMHG | HEIGHT: 71 IN | WEIGHT: 169 LBS | HEART RATE: 60 BPM | SYSTOLIC BLOOD PRESSURE: 118 MMHG | TEMPERATURE: 97.5 F | OXYGEN SATURATION: 98 %

## 2020-09-29 PROCEDURE — 93288 INTERROG EVL PM/LDLS PM IP: CPT | Performed by: INTERNAL MEDICINE

## 2020-09-29 PROCEDURE — 99214 OFFICE O/P EST MOD 30 MIN: CPT | Performed by: NURSE PRACTITIONER

## 2020-09-29 ASSESSMENT — ENCOUNTER SYMPTOMS
BACK PAIN: 0
DIARRHEA: 0
SORE THROAT: 0
CONSTIPATION: 0
NAUSEA: 0
COLOR CHANGE: 0
SHORTNESS OF BREATH: 0
SINUS PRESSURE: 0
COUGH: 0
BLOOD IN STOOL: 0
VOMITING: 0
ABDOMINAL PAIN: 0
WHEEZING: 0
TROUBLE SWALLOWING: 0

## 2020-12-29 ENCOUNTER — NURSE ONLY (OUTPATIENT)
Dept: CARDIOLOGY CLINIC | Age: 75
End: 2020-12-29
Payer: MEDICARE

## 2020-12-29 PROCEDURE — 93296 REM INTERROG EVL PM/IDS: CPT | Performed by: INTERNAL MEDICINE

## 2020-12-29 PROCEDURE — 93294 REM INTERROG EVL PM/LDLS PM: CPT | Performed by: INTERNAL MEDICINE

## 2020-12-29 NOTE — LETTER
[address] [facility] [phone]  [address] [facility],[address] Floor [phone]    Pacemaker/Defibrillator Clinic          12/29/20        [address] [facility] [address] [id number]        Dear Phill Terry    This letter is to inform you that we received the transmission from your monitor at home that checks your implanted heart device. The next date your monitor will automatically transmit will be 3-31-21. If your report needs attention we will notify you. Your device and monitor are wireless and most transmit cellularly, but please periodically check your monitor is still plugged in to the electrical outlet. If you still use the telephone land line to send please ensure the connection to the phone javier is secure. This will help to ensure successful automatic transmissions in the future. Also, the monitor needs to be close to you while sleeping at night. Please be aware that the remote device transmission sites are periodically monitored only during regular business hours during which simultaneous in-office device clinics are being run. If your transmission requires attention, we will contact you as soon as possible. Thank you.             Elmer

## 2021-03-31 ENCOUNTER — NURSE ONLY (OUTPATIENT)
Dept: CARDIOLOGY CLINIC | Age: 76
End: 2021-03-31
Payer: MEDICARE

## 2021-03-31 DIAGNOSIS — Z95.0 PACEMAKER: ICD-10-CM

## 2021-03-31 DIAGNOSIS — I44.1 SYMPTOMATIC ADVANCED HEART BLOCK: ICD-10-CM

## 2021-03-31 PROCEDURE — 93294 REM INTERROG EVL PM/LDLS PM: CPT | Performed by: INTERNAL MEDICINE

## 2021-03-31 PROCEDURE — 93296 REM INTERROG EVL PM/IDS: CPT | Performed by: INTERNAL MEDICINE

## 2021-03-31 NOTE — LETTER
[address] [place] [phone]  [address] Al[address]so [address] [place] [address] [phone]    Pacemaker/Defibrillator Clinic          03/31/21        [address] [facility] [phone]        Dear Mya Terry    This letter is to inform you that we received the transmission from your monitor at home that checks your implanted heart device. The next date your monitor will automatically transmit will be 7-6-21. If your report needs attention we will notify you. Your device and monitor are wireless and most transmit cellularly, but please periodically check your monitor is still plugged in to the electrical outlet. If you still use the telephone land line to send please ensure the connection to the phone javier is secure. This will help to ensure successful automatic transmissions in the future. Also, the monitor needs to be close to you while sleeping at night. Please be aware that the remote device transmission sites are periodically monitored only during regular business hours during which simultaneous in-office device clinics are being run. If your transmission requires attention, we will contact you as soon as possible. Thank you.             Fausto Vuong

## 2021-03-31 NOTE — PROGRESS NOTES
We received remote transmission from patient's monitor at home. Transmission shows normal sensing and pacing function. EP physician will review. See interrogation under cardiology tab in the [address] [facility] [address] field for more details.     TI is normal.

## 2021-07-02 NOTE — PROGRESS NOTES
We received remote transmission from patient's dual chamber pacemaker monitor at home. Transmission shows normal sensing and pacing function. No new arrhythmias/events recorded. Ap 56%   82%    Echo 02.2012 shows LVEF 60%. TI is normal.    EP physician will review.  See interrogation under cardiology tab in the [address] [place] [address] [address] field for more details.

## 2021-07-06 ENCOUNTER — NURSE ONLY (OUTPATIENT)
Dept: CARDIOLOGY CLINIC | Age: 76
End: 2021-07-06
Payer: MEDICARE

## 2021-07-06 DIAGNOSIS — I44.1 SYMPTOMATIC ADVANCED HEART BLOCK: ICD-10-CM

## 2021-07-06 DIAGNOSIS — Z95.0 PACEMAKER: ICD-10-CM

## 2021-07-06 PROCEDURE — 93294 REM INTERROG EVL PM/LDLS PM: CPT | Performed by: INTERNAL MEDICINE

## 2021-07-06 PROCEDURE — 93296 REM INTERROG EVL PM/IDS: CPT | Performed by: INTERNAL MEDICINE

## 2021-07-06 NOTE — LETTER
[address] [facility] [phone]  [address] [place] [address] [phone]    Pacemaker/Defibrillator Clinic    07/02/21      30 [facility] [phone]      Dear Brandon Terry    This letter is to inform you that we received the transmission from your monitor at home that checks your implanted heart device. The next date your monitor will automatically transmit will be 10/26. If your report needs attention we will notify you. Your device and monitor are wireless and most transmit cellularly, but please periodically check your monitor is still plugged in to the electrical outlet. If you still use the telephone land line to send please ensure the connection to the phone javier is secure. This will help to ensure successful automatic transmissions in the future. Also, the monitor needs to be close to you while sleeping at night. Please be aware that the remote device transmission sites are periodically monitored only during regular business hours during which simultaneous in-office device clinics are being run. If your transmission requires attention, we will contact you as soon as possible. **PLEASE NOTE** that our [facility] policy and processes are changing to ensure a more seamless approach for all parties involved, allowing more time for our nurses to address patient issues and concerns. We will no longer be sending letters for NORMAL remote transmissions. You will be contacted by phone if your transmission requires attention (as previously done), and letters will only be sent regarding monitor disconnections or missed transmissions if you are unable to be reached by phone. Please do not be alarmed by this new process, as we will continue to contact you if your transmission report requires attention. This will be your final \"remote received\" letter.   From this point forward, the [facility] will be utilizing the no news is good news approach. As always, please feel free to contact your nurse with any questions or concerns. Thank you.     Elmer

## 2021-07-28 NOTE — PROGRESS NOTES
Cardiology Consultation     Maria Alejandra Adalid Day  1945      Referring Physician: MIGUEL ANGEL  Reason for Referral:   Chief Complaint:   Chief Complaint   Patient presents with   Eber Pena Doctor     SOB       Subjective:     History of Present Illness: The patient is 67 y.o. male with a past medical history significant for Parkinson's Disease, symptomatic bradycardia s/p dual chamber Biotronik pacemaker 7/2017 per Dr Marie Danielle, premature junctional beats, PVC's and blocked PAC's, positional lightheadedness and Parkinsons disease who presents per Dr. Yossi Mcclain referral to establish with primary cardiology. Today, he reports that he continues to work in constructions and is physically active daily. He denies any new or recurrent cardiac sounding complaints. He reports chronic PRECIADO with moderate-heavy exertion. He reports onset around the same times as his Parkinson's diagnosis. He quit smoking 15 years ago. He walks regularly for exercise. He states that 3/4 of the walk with no PRECIADO, end of walk tired and PRECIADO. He states if he \"runs\" up a flight of stairs, he notes mild PRECIADO. He lives in [place] 6 months of the year, follows with physician in [place]. His Parkinson's physician is in [place]. He will continue with routine device interrogations and routine follow up with Dr. Kristy Phillip. Patient denies exertional chest pain/pressure, dyspnea at rest, PND, orthopnea, palpitations, lightheadedness, weight changes, changes in LE edema, and syncope.       Past Medical History:   Diagnosis Date    Advance directive discussed with patient     DNR - Has Living will    Allergic rhinitis     Arrhythmia     s/p pacemaker for heart block: Dr. Marie Danielle 7/31/17    Benign essential tremor 2/27/2017    Brachial neuritis     Chronic back pain     Erectile dysfunction 2/13/2012    History of tobacco abuse 10/1/2019    > 50 pack year    Hypertension 2/13/2012    Parkinson disease ([facility] [address]     Dr. Juanis Cobian    Symptomatic advanced heart block      Past Surgical History:   Procedure Laterality Date    CERVICAL LAMINECTOMY      ACDF    LUMBAR LAMINECTOMY  1964    with Andreia pascual     Family History   Problem Relation Age of Onset    Stroke Mother          80    Other Father          80 Viral Meningitis     Social History     Tobacco Use    Smoking status: Former Smoker     Packs/day: 0.00     Years: 40.00     Pack years: 0.00     Quit date: 2007     Years since quittin.4    Smokeless tobacco: Former User   Vaping Use    Vaping Use: Never used   Substance Use Topics    Alcohol use: Yes     Alcohol/week: 15.0 standard drinks     Types: 15 Cans of beer per week     Comment: occassional    Drug use: Never       Allergies   Allergen Reactions    Inderal [Propranolol] Other (See Comments)     Severe Chest Pains     Current Outpatient Medications   Medication Sig Dispense Refill    carbidopa-levodopa (SINEMET CR)  MG per extended release tablet Take 1 tablet by mouth 3 times daily       No current facility-administered medications for this visit. Review of Systems:  · Constitutional: No unanticipated weight loss. There's been no change in energy level, sleep pattern, or activity level. No fevers, chills. · Eyes: No visual changes or diplopia. No scleral icterus. · ENT: No Headaches, hearing loss or vertigo. No mouth sores or sore throat. · Cardiovascular: as reviewed in HPI  · Respiratory: No cough or wheezing, no sputum production. No hemoptysis. · Gastrointestinal: No abdominal pain, appetite loss, blood in stools. No change in bowel or bladder habits. · Genitourinary: No dysuria, trouble voiding, or hematuria. · Musculoskeletal:  No gait disturbance, no joint complaints. · Integumentary: No rash or pruritis. · Neurological: No headache, diplopia, change in muscle strength, numbness or tingling. · Psychiatric: No anxiety or depression. · Endocrine: No temperature intolerance.  No excessive thirst, fluid intake, or urination. No tremor. · Hematologic/Lymphatic: No abnormal bruising or bleeding, blood clots or swollen lymph nodes. · Allergic/Immunologic: No nasal congestion or hives. Physical Exam:   /80   Pulse 60   Ht 5' 11\" (1.803 m)   Wt 162 lb 6.4 oz (73.7 kg)   SpO2 99%   BMI 22.65 kg/m²   Wt Readings from Last 3 Encounters:   08/09/21 162 lb 6.4 oz (73.7 kg)   09/29/20 169 lb (76.7 kg)   09/21/20 167 lb (75.8 kg)     Constitutional: He is oriented to person, place, and time. He appears well-developed and well-nourished. In no acute distress. Head: Normocephalic and atraumatic. Pupils equal and round. Neck: Neck supple. No JVP or carotid bruit appreciated. No mass and no thyromegaly present. No lymphadenopathy present. Cardiovascular: Normal rate. Normal heart sounds. Exam reveals no gallop and no friction rub. No murmur heard. Pulmonary/Chest: Effort normal and breath sounds normal. No respiratory distress. He has no wheezes, rhonchi or rales. Abdominal: Soft, non-tender. Bowel sounds are normal. He exhibits no organomegaly, mass or bruit. Extremities: No edema. No cyanosis or clubbing. Pulses are 2+ radial/carotid bilaterally. Neurological: No gross cranial nerve deficit. Coordination normal.   Skin: Skin is warm and dry. There is no rash or diaphoresis. Psychiatric: He has a normal mood and affect. His speech is normal and behavior is normal.     Lab Review:    Lab Results   Component Value Date    TRIG 73 10/01/2019    HDL 71 10/01/2019    HDL 48 02/05/2012    LDLCALC 103 10/01/2019    LABVLDL 15 10/01/2019     Lab Results   Component Value Date    BUN 18 10/01/2019    CREATININE 1.2 10/01/2019     Lab Results   Component Value Date/Time    TROPONINI <0.01 10/08/2018 01:31 PM    LABA1C 5.4 05/28/2019 04:56 PM      No results found for: CBCAUTODIF    EKG Interpretation: 8/9/21:       Echo: 2/2012  Normal study.    LVEF 60%     Stress test: 3/8/18    Abigail Bennett RN. Physician Attestation:  The scribes documentation has been prepared under my direction and personally reviewed by me in its entirety. I confirm the note above accurately reflects all work, treatment, procedures, and medical decision making performed by me.     Electronically signed by Felisha Arreguin MD on 8/10/2021 at 7:40 AM

## 2021-08-09 ENCOUNTER — OFFICE VISIT (OUTPATIENT)
Dept: CARDIOLOGY CLINIC | Age: 76
End: 2021-08-09
Payer: MEDICARE

## 2021-08-09 VITALS
OXYGEN SATURATION: 99 % | SYSTOLIC BLOOD PRESSURE: 128 MMHG | HEART RATE: 60 BPM | HEIGHT: 71 IN | WEIGHT: 162.4 LBS | BODY MASS INDEX: 22.73 KG/M2 | DIASTOLIC BLOOD PRESSURE: 80 MMHG

## 2021-08-09 DIAGNOSIS — R06.09 DOE (DYSPNEA ON EXERTION): ICD-10-CM

## 2021-08-09 DIAGNOSIS — Z95.0 PACEMAKER: Primary | ICD-10-CM

## 2021-08-09 DIAGNOSIS — Z87.891 FORMER SMOKER: ICD-10-CM

## 2021-08-09 DIAGNOSIS — R26.81 UNSTEADINESS ON FEET: ICD-10-CM

## 2021-08-09 DIAGNOSIS — I44.1 SYMPTOMATIC ADVANCED HEART BLOCK: ICD-10-CM

## 2021-08-09 PROCEDURE — 99204 OFFICE O/P NEW MOD 45 MIN: CPT | Performed by: INTERNAL MEDICINE

## 2021-08-09 PROCEDURE — 93000 ELECTROCARDIOGRAM COMPLETE: CPT | Performed by: INTERNAL MEDICINE

## 2021-08-09 NOTE — PATIENT INSTRUCTIONS
Patient Education        Cardiac Perfusion Scan (Medicine): About This Test  What is it? A cardiac perfusion scan measures the amount of blood in your heart muscle at rest and after your heart has been made to work hard. Either medicine or exercise can be used to stress the heart. This information is about using medicine for this test.  During the scan, a camera takes pictures of your heart after a radioactive tracer is injected into a vein in your arm. The tracer travels through the blood and into your heart. As the tracer moves through your heart, areas that have good blood flow absorb the tracer. Areas that don't absorb the tracer may not be getting enough blood or may have been damaged by a heart attack. The pictures show this difference. Two sets of pictures may be made during the test. One set is taken while you are resting. Another set is taken after your heart has been made to work harder (called a stress test). Why is this test done? The test is often done to find out what may be causing chest pain or pressure. It may be done after a heart attack to see if areas of the heart are not getting enough blood or to find out how much your heart has been damaged from the heart attack. How do you prepare for the test?  Tell your doctor ALL the medicines, vitamins, supplements, and herbal remedies you take. Some may increase the risk of problems during your test. Your doctor will tell you if you should stop taking any of them before the test and how soon to do it. If you take aspirin or some other blood thinner, ask your doctor if you should stop taking it before your test. Make sure that you understand exactly what your doctor wants you to do. These medicines increase the risk of bleeding. Tell your doctor if you are taking any erection-enhancing medicines (such as Cialis, Levitra, or Viagra).  You may need to take nitroglycerin during this test, which can cause a serious reaction if you have taken an erection-enhancing medicine within the previous 48 hours. Do not have any caffeine, such as coffee or tea, for 24 hours before the test.  If you are breastfeeding, you may want to pump enough breast milk before the test to get through 1 to 2 days of feeding. The radioactive tracer used in this test can get into your breast milk and is not good for the baby. How is the test done? Resting or baseline scan  · You will take your top off and be given a gown to wear. · Electrodes will be attached to your chest to keep track of your heartbeats. · You will have a tube, called an IV, going into your arm. A small amount of the radioactive tracer will be put in the IV. · You will lie on your back or your stomach on a table with a large camera positioned above your chest. The camera records the tracer's signals as it moves through your blood. · You will be asked to remain very still during each scan, which takes about 5 to 10 minutes. Several scans will be taken. Stress scan using medicine  The stress scan is done in two parts. In many hospitals, you first have the resting scan. You are then given a medicine that makes your heart work harder and you have another scan. Sometimes the stress scan is done first.  · You will have a test called an electrocardiogram (EKG or ECG), which takes about 5 to 10 minutes. You may have other EKGs during and after the stress test.  · Medicine will be put in your IV. It will make your heart work harder. You may get a headache and feel dizzy, flushed, and nauseated from the medicine, but these symptoms usually do not last long. · Your heartbeat and blood pressure may be checked. · Your symptoms such as chest pain and shortness of breath will be checked. · A few minutes after you get the medicine, another small amount of radioactive tracer is injected. You may be given something to reverse the medicine used to make your heart work harder.   · You will wait for 30 to 40 minutes and then have another resting scan. What else should you know about the test?  · Sometimes more pictures are taken 2 to 4 hours after the stress scan. · No electricity passes through your body during the test. There is no danger of getting an electrical shock. · Anytime you're exposed to radiation, there's a small chance of damage to cells or tissue. That's the case even with the low-level radioactive tracer used for this test. But the chance of damage is very low compared with the benefits of the test.  · Most of the tracer will leave your body through your urine or stool within a day. So be sure to flush the toilet right after you use it, and wash your hands well with soap and water. The amount of radiation in the tracer is very small. This means it isn't a risk for people to be around you after the test.  What happens after the test?  · You will probably be able to go home right away. · You can go back to your usual activities right away. When should you call for help? Call 911 anytime you think you may need emergency care. For example, call if:  · You passed out (lost consciousness). · You have been diagnosed with angina, and you have angina symptoms that do not go away with rest or are not getting better within 5 minutes after you take a dose of nitroglycerin. · You have symptoms of a heart attack. These may include:  ? Chest pain or pressure, or a strange feeling in the chest.  ? Sweating. ? Shortness of breath. ? Nausea or vomiting. ? Pain, pressure, or a strange feeling in the back, neck, jaw, or upper belly or in one or both shoulders or arms. ? Lightheadedness or sudden weakness. ? A fast or irregular heartbeat. After you call 911, the  may tell you to chew 1 adult-strength or 2 to 4 low-dose aspirin. Wait for an ambulance. Do not try to drive yourself. Watch closely for changes in your health, and be sure to contact your doctor if you have any problems.   Follow-up care is a key part of your treatment and safety. Be sure to make and go to all appointments, and call your doctor if you are having problems. It's also a good idea to keep a list of the medicines you take. Ask your doctor when you can expect to have your test results. Where can you learn more? Go to https://chpepiceweb.[employer]. org and sign in to your [employer] account. Enter R320 in the Rapid RMS box to learn more about \"Cardiac Perfusion Scan (Medicine): About This Test.\"     If you do not have an account, please click on the \"Sign Up Now\" link. Current as of: August 31, 2020               Content Version: 12.9  © 2006-2021 [employer]. Care instructions adapted under license by [facility] ([facility]). If you have questions about a medical condition or this instruction, always ask your healthcare professional. [place] [address] any warranty or liability for your use of this information. Patient Education        Transthoracic Echocardiogram: About This Test  What is it? An echocardiogram (also called an echo) uses sound waves to make an image of your heart. A device called a transducer sends sound waves that echo off your heart and back to the transducer. These echoes are turned into moving pictures of your heart that can be seen on a video screen. In a transthoracic echocardiogram (TTE), the transducer is moved across your chest or belly. A TTE is the most common type of echocardiogram.  Why is this test done? This test is done to check your heart health. It's used for many reasons. For example, it may be done to:  · Check a heart murmur. · Look for the cause of shortness of breath or unexplained chest pain or pressure. · Check how well your heart is pumping blood. · Check to see how well your heart valves are working. · Look for blood clots inside your heart. · Measure the size and shape of the heart's chambers.   · Measure the blood pressure and speed of blood flow through the heart. How is the test done? · You may be asked to remove your clothes above your waist. You may be given a cloth or paper covering to use during the test.  · You will lie on your back or on your left side on a bed or table. · You may receive medicine through a vein (intravenously, or IV). The IV can be used to give you a contrast material. This helps your doctor get good views of your heart. · Small pads or patches (electrodes) will be placed on the skin of your chest to record your heart rate during the test.  · A small amount of gel will be rubbed on the side of your chest to help  the sound waves. · The transducer will be pressed firmly against your chest and moved slowly back and forth. It is usually moved to different areas on your chest or belly to get specific views of your heart. · You will be asked to do several things, such as hold very still, breathe in and out very slowly, hold your breath, or lie on your left side. · When the test is over, the gel is wiped off and the electrodes are removed. What are the risks of the test?  There are no known risks from having this test.  · No electricity passes through your body during the test. There is no danger of getting an electrical shock. · You do not receive any radiation. What happens after the test?  · You will probably be able to go home right away. It depends on the reason for the test.  · You can go back to your usual activities right away. Follow-up care is a key part of your treatment and safety. Be sure to make and go to all appointments, and call your doctor if you are having problems. It's also a good idea to keep a list of the medicines you take. Ask your doctor when you can expect to have your test results. Current as of: August 31, 2020               Content Version: 12.9  © 2006-2021 Healthwise[employer]. Care instructions adapted under license by [facility] ([facility]).  If you have questions about a medical condition or this instruction, always ask your healthcare professional. [address] any warranty or liability for your use of this information. Patient Education        Doppler Ultrasound: About This Test  What is it? An ultrasound uses reflected sound waves to produce a picture of organs and blood vessels. The sound waves create a picture on a video screen. Doppler ultrasound is a special kind of ultrasound. It can detect movement of blood through arteries and veins. Some ultrasound tests are called \"duplex. \" Duplex means \"two parts. \" A duplex ultrasound combines the Doppler ultrasound with the more common ultrasound. The combination can help the doctor see more clearly what's going on. There are no risks linked to an ultrasound test, and it is safe for pregnant women. It won't harm the baby (fetus). Why is this test done? You might have a Doppler ultrasound to:  · Look for reduced blood flow in major neck arteries. Low blood flow in these arteries can cause a stroke. · Find a blood clot in leg veins, which could be a deep vein thrombosis. · Check blood flow in a fetus to check the health of the fetus. · Find a blockage or a narrowing in the arteries that go to the kidneys. How do you prepare for the test?  Depending on what the test is for, you may be asked not to eat or drink after midnight before the test. Or you may be asked to drink water right before the test so that your bladder is full. How is the test done? The doctor or ultrasound technologist will have you lie on your back, side, or stomach, depending on which part of your body is being examined. · A gel will be applied to your skin. This helps the passage of sound waves. · A hand-held device called a transducer will be moved along your skin. · You'll need to stay still during the test.  How long does the test take? The test will take 30 to 60 minutes.   What happens after the test?  · The scans from the test will be read within a short time, in case a repeat test is needed. · You will probably be able to go home as soon as the test has been read. · You can go back to your usual activities right away. Follow-up care is a key part of your treatment and safety. Be sure to make and go to all appointments, and call your doctor if you are having problems. It's also a good idea to keep a list of the medicines you take. Ask your doctor when you can expect to have your test results. Where can you learn more? Go to https://DeepFlexpeEventyard.[employer]. org and sign in to your [employer] account. Enter A304 in the Zite box to learn more about \"Doppler Ultrasound: About This Test.\"     If you do not have an account, please click on the \"Sign Up Now\" link. Current as of: September 23, 2020               Content Version: 12.9  © 2950-8059 Healthwise, Incorporated. Care instructions adapted under license by [facility] ([facility]). If you have questions about a medical condition or this instruction, always ask your healthcare professional. [address] any warranty or liability for your use of this information.

## 2021-08-26 ENCOUNTER — TELEPHONE (OUTPATIENT)
Dept: CARDIOLOGY CLINIC | Age: 76
End: 2021-08-26

## 2021-08-26 NOTE — TELEPHONE ENCOUNTER
Patient is scheduled to see Dr. Jennifer Rdz on 9/1/2021 for yearly follow up with device check prior. Please cancel appointment with Dr. Jennifer Rdz and reschedule patient for follow up with Dr. Shazia Jung, ROBERTO next available.     Thanks,  Brian Reardon RN

## 2021-08-30 ENCOUNTER — TELEPHONE (OUTPATIENT)
Dept: CARDIOLOGY CLINIC | Age: 76
End: 2021-08-30

## 2021-08-30 NOTE — TELEPHONE ENCOUNTER
Patient called in to our office earlier stating that since he no longer has an appointment with Dr. 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

## 2021-08-30 NOTE — LETTER
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 If you have any questions or concerns, please don't hesitate to call.     Sincerely,        Salina Baez MD

## 2021-08-30 NOTE — PROGRESS NOTES
[facility]   Electrophysiology      Date: 9/1/2021    Chief Complaint:   Chief Complaint   Patient presents with    1 Year Follow Up     No CC     History of Present Illness:    I saw Carley Terry in the office for electrophysiology follow up today. He is a 67 y.o. male with a past medical history of symptomatic bradycardia s/p dual chamber Biotronik pacemaker implant in July 2017 by Dr. Lizet Tracy and Parkinsons disease. He has been doing well since last year. He does continue to feel lightheaded if he stands too quickly but if he stays still, this goes away quickly. Denies any syncope. No chest pain or palpitations. No dyspnea. No edema. He remains very active, he is still working as a . Also spends half the year in [place]. Allergies: Allergies   Allergen Reactions    Inderal [Propranolol] Other (See Comments)     Severe Chest Pains     Home Medications:  Prior to Visit Medications    Medication Sig Taking? Authorizing Provider   carbidopa-levodopa (SINEMET CR)  MG per extended release tablet Take 1 tablet by mouth 3 times daily Yes Historical Provider, MD        Past Medical History:  Past Medical History:   Diagnosis Date    Advance directive discussed with patient     DNR - Has Living will    Allergic rhinitis     Arrhythmia     s/p pacemaker for heart block: Dr. Lizet Tracy 7/31/17    Benign essential tremor 2/27/2017    Brachial neuritis     Chronic back pain     Erectile dysfunction 2/13/2012    History of tobacco abuse 10/1/2019    > 50 pack year    Hypertension 2/13/2012    Parkinson disease ([address]     Dr. Juanito Ryan    Symptomatic advanced heart block        Past Surgical History:   Past Surgical History:   Procedure Laterality Date    CERVICAL LAMINECTOMY  1995    ACDF    LUMBAR LAMINECTOMY  1964    with Boswell ankur       Social History:   reports that he quit smoking about 14 years ago. He smoked 0.00 packs per day for 40.00 years.  He has quit using smokeless Abdomen is flat. Bowel sounds are normal.      Palpations: Abdomen is soft. Musculoskeletal:         General: Normal range of motion. Right lower leg: No edema. Left lower leg: No edema. Skin:     General: Skin is warm and dry. Findings: No bruising. Neurological:      General: No focal deficit present. Mental Status: He is alert and oriented to person, place, and time. Motor: No weakness. Psychiatric:         Mood and Affect: Mood normal.         Behavior: Behavior normal.          Pertinent labs, diagnostic, device, and imaging results reviewed as a part of this visit    LABS    CBC:   Lab Results   Component Value Date    WBC 6.1 10/01/2019    HGB 13.9 10/01/2019    HCT 42.0 10/01/2019    MCV 95.5 10/01/2019     10/01/2019     BMP:   Lab Results   Component Value Date    CREATININE 1.2 10/01/2019    BUN 18 10/01/2019     10/01/2019    K 4.3 10/01/2019     10/01/2019    CO2 27 10/01/2019     CrCl cannot be calculated (Patient's most recent lab result is older than the maximum 120 days allowed. ). No results found for: BNP    Thyroid:   Lab Results   Component Value Date    TSH 2.41 10/05/2015     Lipid Panel:   Lab Results   Component Value Date    CHOL 189 10/01/2019    HDL 71 10/01/2019    HDL 48 2012    TRIG 73 10/01/2019     LFTs:  Lab Results   Component Value Date    ALT 10 10/01/2019    AST 21 10/01/2019    ALKPHOS 42 10/01/2019    BILITOT <0.2 10/01/2019     Coags:   Lab Results   Component Value Date    PROTIME 11.1 2017    INR 0.98 2017       EC21  V-paced at 65 BPM.    Echo: 2012  Normal study. LVEF 60%    Stress test: 3/8/18      Conclusions          Summary     Normal myocardial perfusion study.     Normal myocardial perfusion.     Normal LV size and systolic function.     Overall findings represent a low risk study. Procedures:  1.  Dual chamber Biotronik pacemaker implant in 17 by Dr. Julian Sawyer interrogation: 9/1/2021   Normal device function. Battery life 5 years, 9 months  A paced 56%  V paced 79%  Low AT/AF burden, longest was 1 minute 36 seconds. Assessment & Plan:    Symptomatic bradycardia   - with premature junction beats, PVCs and blocked PACs   - s/p dual chamber Biotronik PPM implant on 8/7/17   - device interrogation as above, continue with routine follow up with device clinic   - had previous syncope when mode changed to VVI at 30 - currently programmed to DDDR    Parkinsons disease   - management per PCP    Thank you for allowing to us to participate in the care of Consolidated Erich Day. Return in about 1 year (around 9/1/2022) for an appointment with Ofelia Stone NP.      MAL Yin  [facility], [address] [place] [address]  Phone: [phone]  Fax: [phone]    Electronically signed by MAL Goldstein - CNP on 9/1/2021 at 8:50 AM

## 2021-08-31 NOTE — TELEPHONE ENCOUNTER
Dr. 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 He has a Biotronik dual chamber pacemaker implanted on 7/31/2017 for symptomatic bradycardia. Last year you had filled out a from that was sent by [facility] [place] [facility] occupational medication. 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 (Scanned into Media marked as correspondence 10/5/2020). Please advise if ok to create letter.     Thanks,  Shalini Barron RN

## 2021-09-01 ENCOUNTER — OFFICE VISIT (OUTPATIENT)
Dept: CARDIOLOGY CLINIC | Age: 76
End: 2021-09-01
Payer: MEDICARE

## 2021-09-01 ENCOUNTER — NURSE ONLY (OUTPATIENT)
Dept: CARDIOLOGY CLINIC | Age: 76
End: 2021-09-01
Payer: MEDICARE

## 2021-09-01 VITALS
HEIGHT: 71 IN | BODY MASS INDEX: 22.54 KG/M2 | WEIGHT: 161 LBS | DIASTOLIC BLOOD PRESSURE: 72 MMHG | HEART RATE: 65 BPM | OXYGEN SATURATION: 99 % | SYSTOLIC BLOOD PRESSURE: 120 MMHG

## 2021-09-01 DIAGNOSIS — Z95.0 PACEMAKER: ICD-10-CM

## 2021-09-01 DIAGNOSIS — R00.1 SYMPTOMATIC BRADYCARDIA: Primary | ICD-10-CM

## 2021-09-01 DIAGNOSIS — I44.1 SYMPTOMATIC ADVANCED HEART BLOCK: ICD-10-CM

## 2021-09-01 PROCEDURE — 93280 PM DEVICE PROGR EVAL DUAL: CPT | Performed by: INTERNAL MEDICINE

## 2021-09-01 PROCEDURE — 99214 OFFICE O/P EST MOD 30 MIN: CPT | Performed by: NURSE PRACTITIONER

## 2021-09-01 ASSESSMENT — ENCOUNTER SYMPTOMS
SINUS PRESSURE: 0
BACK PAIN: 0
COUGH: 0
TROUBLE SWALLOWING: 0
SHORTNESS OF BREATH: 0
COLOR CHANGE: 0
BLOOD IN STOOL: 0
ABDOMINAL PAIN: 0
NAUSEA: 0
CONSTIPATION: 0
VOMITING: 0
DIARRHEA: 0
WHEEZING: 0
SORE THROAT: 0

## 2021-09-01 NOTE — PROGRESS NOTES
Pt seen in clinic today for annual cardiac device interrogation. Their device is a BTK 2 chamber PPM   Based on threshold, impedance, and intrinsic sensing tests run today, the device appears to be functioning normally. Remaining battery life is 5y9m  AP 56%                  79%      RV pacing % high but pt presents with junctional intrinsic rhythm - no changes made. no new episodes Afib since feb 2021    Pt was informed of findings today and general questions have been answered with regard to device. Home monitoring hardware is transmitting on schedule. Results discussed with or to be reviewed by EP    Pt to see NP Polleys in clinic today following their device check.

## 2021-09-07 ENCOUNTER — TELEPHONE (OUTPATIENT)
Dept: FAMILY MEDICINE CLINIC | Age: 76
End: 2021-09-07

## 2021-09-07 NOTE — TELEPHONE ENCOUNTER
----- Message from Harley Funes sent at 9/3/2021  2:35 PM EDT -----  Subject: Message to Provider    QUESTIONS  Information for Provider? Pt would like his flu shot asap, and was hoping   the might be in earlier than Oct/01. If so he would like a call to get set   up.  ---------------------------------------------------------------------------  --------------  [address]  What is the best way for the office to contact you? OK to leave message on   voicemail  Preferred Call Back Phone Number? [phone]  ---------------------------------------------------------------------------  --------------  SCRIPT ANSWERS  Relationship to Patient?  Self

## 2021-09-08 ENCOUNTER — NURSE ONLY (OUTPATIENT)
Dept: FAMILY MEDICINE CLINIC | Age: 76
End: 2021-09-08
Payer: MEDICARE

## 2021-09-08 DIAGNOSIS — Z23 FLU VACCINE NEED: Primary | ICD-10-CM

## 2021-09-08 PROCEDURE — 90694 VACC AIIV4 NO PRSRV 0.5ML IM: CPT | Performed by: INTERNAL MEDICINE

## 2021-09-08 PROCEDURE — G0008 ADMIN INFLUENZA VIRUS VAC: HCPCS | Performed by: INTERNAL MEDICINE

## 2021-09-13 ENCOUNTER — HOSPITAL ENCOUNTER (OUTPATIENT)
Dept: NON INVASIVE DIAGNOSTICS | Age: 76
Discharge: HOME | End: 2021-09-13
Payer: MEDICARE

## 2021-09-13 ENCOUNTER — HOSPITAL ENCOUNTER (OUTPATIENT)
Dept: VASCULAR LAB | Age: 76
Discharge: HOME | End: 2021-09-13
Payer: MEDICARE

## 2021-09-13 DIAGNOSIS — Z87.891 FORMER SMOKER: ICD-10-CM

## 2021-09-13 DIAGNOSIS — R06.09 DOE (DYSPNEA ON EXERTION): ICD-10-CM

## 2021-09-13 DIAGNOSIS — R26.81 UNSTEADINESS ON FEET: ICD-10-CM

## 2021-09-13 LAB
LV EF: 50 %
LV EF: 63 %
LVEF MODALITY: NORMAL
LVEF MODALITY: NORMAL

## 2021-09-13 PROCEDURE — 3430000000 HC RX DIAGNOSTIC RADIOPHARMACEUTICAL: Performed by: INTERNAL MEDICINE

## 2021-09-13 PROCEDURE — 93306 TTE W/DOPPLER COMPLETE: CPT

## 2021-09-13 PROCEDURE — 78452 HT MUSCLE IMAGE SPECT MULT: CPT

## 2021-09-13 PROCEDURE — 6360000002 HC RX W HCPCS: Performed by: INTERNAL MEDICINE

## 2021-09-13 PROCEDURE — 93017 CV STRESS TEST TRACING ONLY: CPT

## 2021-09-13 PROCEDURE — 93880 EXTRACRANIAL BILAT STUDY: CPT

## 2021-09-13 PROCEDURE — A9502 TC99M TETROFOSMIN: HCPCS | Performed by: INTERNAL MEDICINE

## 2021-09-13 RX ADMIN — REGADENOSON 0.4 MG: 0.08 INJECTION, SOLUTION INTRAVENOUS at 10:05

## 2021-09-13 RX ADMIN — TETROFOSMIN 10 MILLICURIE: 1.38 INJECTION, POWDER, LYOPHILIZED, FOR SOLUTION INTRAVENOUS at 09:09

## 2021-09-13 RX ADMIN — TETROFOSMIN 30 MILLICURIE: 1.38 INJECTION, POWDER, LYOPHILIZED, FOR SOLUTION INTRAVENOUS at 10:18

## 2021-10-26 ENCOUNTER — NURSE ONLY (OUTPATIENT)
Dept: CARDIOLOGY CLINIC | Age: 76
End: 2021-10-26
Payer: MEDICARE

## 2021-10-26 DIAGNOSIS — R00.1 SYMPTOMATIC BRADYCARDIA: ICD-10-CM

## 2021-10-26 DIAGNOSIS — Z95.0 PACEMAKER: ICD-10-CM

## 2021-10-26 DIAGNOSIS — I44.1 SYMPTOMATIC ADVANCED HEART BLOCK: ICD-10-CM

## 2021-10-26 PROCEDURE — 93294 REM INTERROG EVL PM/LDLS PM: CPT | Performed by: INTERNAL MEDICINE

## 2021-10-26 PROCEDURE — 93296 REM INTERROG EVL PM/IDS: CPT | Performed by: INTERNAL MEDICINE

## 2021-10-26 NOTE — PROGRESS NOTES
We received remote transmission from patient's dual chamber pacemaker monitor at home. Transmission shows normal sensing and pacing function. No new arrhythmias/events recorded. Ap 53%   81%  Echo 09.2021 showed EF of 50%. TI is normal.    EP physician will review. See interrogation under cardiology tab in the [address] [place] [address] [address] field for more details. Will continue to monitor remotely.

## 2021-12-23 ENCOUNTER — NURSE ONLY (OUTPATIENT)
Dept: CARDIOLOGY CLINIC | Age: 76
End: 2021-12-23

## 2021-12-23 NOTE — RESULT ENCOUNTER NOTE
Device interrogation reviewed. New onset atrial fibrillation. Please ask patient to see me in clinic to discuss treatment options for atrial fibrillation including ablation and [address] [place] [address].

## 2021-12-23 NOTE — PROGRESS NOTES
[facility] ALERT transmission received 12.23.21 for Atrial burden above limit. We received remote transmission from patient's dual chamber pacemaker monitor at home. Transmission shows normal sensing and pacing function. Atrial burden last measured 6% of day on 12.13.21 @ 1:15am (0% of day since 09.07.21). 1 AT EGM available illustrating AF w intermittent undersensing lasting 34mins (appears to be new to pt, no OAC). Ap 55%   82%  Echo 09.2021 showed EF of 50%. TI is normal.    EP physician will review. See interrogation under cardiology tab in the [address] [place] [address] [address] field for more details. Will continue to monitor remotely.

## 2021-12-27 ENCOUNTER — TELEPHONE (OUTPATIENT)
Dept: CARDIOLOGY CLINIC | Age: 76
End: 2021-12-27

## 2021-12-27 NOTE — TELEPHONE ENCOUNTER
TROY Terry received a call from Alcira Cr on 12/23 regarding abnormal remote device interrogation. He is in [place] and went to [facility] in [facility]. Chanelle Sky Friday 12/24. He was treated in the ED by Dr. Kevin Vizcaino and released. Dr. Kevin Vizcaino referred him to cardiology for cardiac cath , but is waiting for a call to be scheduled. C/O CP& SOB on going and will return to ED if symptoms worsen or he does not hear from cardiology regarding scheduling quickly.        I called [facility] to obtain records , Fax request sent to [phone]

## 2021-12-30 NOTE — TELEPHONE ENCOUNTER
I spoke with patient advised we received records. He states that he has still not had his LHC scheduled. He stated he has been calling [phone] amd speaking with  Kandis Plata. I called to see if I could expedite things and the office stated they will be in contact with him early next week to get LHC scheduled. I called patient and notified him.

## 2022-01-10 LAB
BUN BLDV-MCNC: 28 MG/DL
CALCIUM SERPL-MCNC: 9.1 MG/DL
CHLORIDE BLD-SCNC: 107 MMOL/L
CHOLESTEROL, TOTAL: 187 MG/DL
CHOLESTEROL/HDL RATIO: NORMAL
CO2: 25 MMOL/L
CREAT SERPL-MCNC: 1.16 MG/DL
GFR CALCULATED: NORMAL
GLUCOSE BLD-MCNC: 92 MG/DL
HDLC SERPL-MCNC: 50 MG/DL (ref 35–70)
LDL CHOLESTEROL CALCULATED: 116 MG/DL (ref 0–160)
NONHDLC SERPL-MCNC: NORMAL MG/DL
POTASSIUM SERPL-SCNC: 4.1 MMOL/L
SODIUM BLD-SCNC: 137 MMOL/L
TRIGL SERPL-MCNC: 103 MG/DL
VLDLC SERPL CALC-MCNC: NORMAL MG/DL

## 2022-01-12 ENCOUNTER — TELEPHONE (OUTPATIENT)
Dept: CARDIOLOGY CLINIC | Age: 77
End: 2022-01-12

## 2022-01-12 NOTE — TELEPHONE ENCOUNTER
Genesis Pagan called in this afternoon wanting to talk to Dr. Esme Neff RN. Genesis Pagan said he had a cath procedure this morning down in [place] and would like to discuss it with the nurse.      You can reach Genesis Pagan at #[phone]

## 2022-01-12 NOTE — TELEPHONE ENCOUNTER
Returned patient's call and he wanted to let us know that he had a LHC today with his cardiologist in [place] that revealed mild nonobstructive CAD, normal LVED, ectopy present. He remains admitted, LHC was completed for recently presenting to the hospital with c/o exertional CP, also noted to have a recent normal stress per [address], not completely updated. He does reports he will bring back imaging on a disc and paper records.

## 2022-01-13 ENCOUNTER — TELEPHONE (OUTPATIENT)
Dept: CARDIOLOGY CLINIC | Age: 77
End: 2022-01-13

## 2022-01-13 RX ORDER — ISOSORBIDE MONONITRATE 30 MG/1
30 TABLET, EXTENDED RELEASE ORAL DAILY
Qty: 30 TABLET | Refills: 0 | COMMUNITY
Start: 2022-01-13 | End: 2022-08-24

## 2022-01-13 RX ORDER — ATORVASTATIN CALCIUM 40 MG/1
40 TABLET, FILM COATED ORAL NIGHTLY
Qty: 30 TABLET | Refills: 0 | COMMUNITY
Start: 2022-01-13 | End: 2022-03-28 | Stop reason: SDUPTHER

## 2022-01-13 NOTE — TELEPHONE ENCOUNTER
Please contact patient and let him know that we discussed new medications with Dr. Zoey Jones and he agrees that he should be taking isosorbide mononitrate ER, 30 mg tablet once a day and atorvastatin, 40 mg tablet, take one tablet by mouth nightly. Also let him know that Dr. Zoey Jones would like him to establish care with a general cardiologist here at our office for management of his coronary artery disease. Schedule an appointment first available when patient is back in town with Dr. Brady Grady. Med list updated with new medications.

## 2022-01-13 NOTE — TELEPHONE ENCOUNTER
Maria Eugenia Collier is calling in wanting to notify Dr. Romana Bianchi that had a cardiac cath at [facility]. Dr. Angie Shirley prescribed him isosorbide mononitrate ER, 30 mg tablet once a day and atorvastatin, 40 mg tablet, take one tablet by mouth nightly. Maria Eugenia Collier wants to make sure that this is okay with Dr. Romana Bianchi before taking the medication. Maria Eugenia Collier can be reached at [phone].

## 2022-01-13 NOTE — TELEPHONE ENCOUNTER
Spoke to patient and gave him entire message. Pt states he has already seen Dr. Shelly Cates and will call for a follow up appointment when he is back in town.

## 2022-01-19 ENCOUNTER — NURSE ONLY (OUTPATIENT)
Dept: CARDIOLOGY CLINIC | Age: 77
End: 2022-01-19

## 2022-01-19 NOTE — PROGRESS NOTES
[facility] transmission received 01.19.22 for Atrial burden above limit. We received remote transmission from patient's dual chamber pacemaker monitor at home. Transmission shows normal sensing and pacing function. Atrial burden last measured 4% of day on 01.19.22 @ 1:15am (0% of day since 12.25.21). 1 AT EGM available illustrating AF w intermittent undersensing. Per last remote AF alert, \"Pt returned call and now agrees to see cardiologist in [place] since he does not want to return home. I advised him to avoid strenuous activity and to go to the ED if he his chest pain worsens. He will also discuss his atrial fib with his cardiologist in [place] and request records from [facility] once he has an appointment. \" Pt not on [address] per med list.    Ap 76%   85%  Echo 09.2021 showed EF of 50%. TI is normal.    Pt currently has appt 03.28.22/[facility], likely when he returns from [place]. EP physician will review. See interrogation under cardiology tab in the [address] field for more details. Will continue to monitor remotely.

## 2022-01-20 ENCOUNTER — TELEPHONE (OUTPATIENT)
Dept: CARDIOLOGY CLINIC | Age: 77
End: 2022-01-20

## 2022-01-20 DIAGNOSIS — G25.0 BENIGN ESSENTIAL TREMOR: ICD-10-CM

## 2022-01-20 RX ORDER — PROPRANOLOL HCL 60 MG
60 CAPSULE, EXTENDED RELEASE 24HR ORAL DAILY
Qty: 30 CAPSULE | Refills: 5 | COMMUNITY
Start: 2022-01-20 | End: 2022-04-27 | Stop reason: HOSPADM

## 2022-01-20 NOTE — TELEPHONE ENCOUNTER
Spoke with patient instructed to space out his medications    Taking propanolol in the AM and Isosorbide at night prior to going to bed and to call back with update if symptoms do not improve. Verbalized understanding.

## 2022-01-20 NOTE — TELEPHONE ENCOUNTER
Anne Dodson called the office this morning stating that he wants to talk to Dr. Paola Lai or his RN about his medications. He states the he takes them all in the morning and it lowers his BP and makes him feel dizzy, like he is going to fall over and pass out. He said once the medications wear off he feels great, and \"ready to party. \"    He is taking:    isosorbide mononitrate (IMDUR) 30 MG extended release tablet   atorvastatin (LIPITOR) 40 MG tablet   carbidopa-levodopa (SINEMET CR)  MG per extended release tablet   Eliquis   Propranolol

## 2022-01-25 ENCOUNTER — NURSE ONLY (OUTPATIENT)
Dept: CARDIOLOGY CLINIC | Age: 77
End: 2022-01-25
Payer: MEDICARE

## 2022-01-25 DIAGNOSIS — I44.1 SYMPTOMATIC ADVANCED HEART BLOCK: ICD-10-CM

## 2022-01-25 DIAGNOSIS — R00.1 SYMPTOMATIC BRADYCARDIA: ICD-10-CM

## 2022-01-25 DIAGNOSIS — Z95.0 PACEMAKER: ICD-10-CM

## 2022-01-25 PROCEDURE — 93294 REM INTERROG EVL PM/LDLS PM: CPT | Performed by: INTERNAL MEDICINE

## 2022-01-25 PROCEDURE — 93296 REM INTERROG EVL PM/IDS: CPT | Performed by: INTERNAL MEDICINE

## 2022-01-25 NOTE — PROGRESS NOTES
We received remote transmission from patient's dual chamber pacemaker monitor at home. Transmission shows normal sensing and pacing function. AT/AF noted, 1% burden of day since 12.25.21. Pt now on Eliquis, propanolol. Ap 74%   85%  Echo 09.2021 showed EF of 50%. TI is normal (slight decreasing trend above reference line). Pt currently has appt 03.28.22/[facility], likely when he returns from [place]. EP physician will review. See interrogation under cardiology tab in the [address] [place] [address] [address] field for more details. Will continue to monitor remotely.

## 2022-02-02 ENCOUNTER — TELEPHONE (OUTPATIENT)
Dept: FAMILY MEDICINE CLINIC | Age: 77
End: 2022-02-02

## 2022-02-02 ENCOUNTER — TELEPHONE (OUTPATIENT)
Dept: CARDIOLOGY CLINIC | Age: 77
End: 2022-02-02

## 2022-02-02 DIAGNOSIS — R06.09 DOE (DYSPNEA ON EXERTION): Primary | ICD-10-CM

## 2022-02-02 NOTE — TELEPHONE ENCOUNTER
Please contact patient and ask which doctor he saw in [place] so we can get his records.     Please let him know that he should contact his PCP Isha Schofield MD regarding his shortness of breath and to obtain referral to pulmonologist.

## 2022-02-02 NOTE — TELEPHONE ENCOUNTER
Called and had to leave a message for the medical records dept.  Will have them fax records here and when we get them we can scan them in

## 2022-02-02 NOTE — TELEPHONE ENCOUNTER
Sorry to hear. What does he need to see pulm for. Where was he hospitalized and what happened.   Try to get records    Back to me so I can generate referral with info,

## 2022-02-02 NOTE — TELEPHONE ENCOUNTER
Left message a detailed message on v/m to call office with name and phone number of cardiology in [place] so we can obtain records and to call his PCP for referral to pulmonology.

## 2022-02-02 NOTE — TELEPHONE ENCOUNTER
Josr Regalado calling back and I asked him for the phone number and [address] name in [place] and she states he will call us this afternoon when he gets home and call us back with the information.   Also advised him to call his PCP for referral to Pulmonology

## 2022-02-02 NOTE — TELEPHONE ENCOUNTER
Pt is calling because he was in the hospital while he was in [facility]. They advised that he is needing to see a pulmonologist. He will be back end of march. He is needing a referral to call and at least get it scheduled now. Please advise. CARLOS ALBERTO teixeira states that he has AFIb.  States that he already sees a cardiologist.

## 2022-02-02 NOTE — TELEPHONE ENCOUNTER
Madhavi Bay called in this morning wanting to talk to Dr. Erika Ellis or his RN. He said that Dr. Erika Ellis needs to call the doctor he seen down in [place] to get his medical records, he also said that before he sees Dr. Erika Ellis that he will need labs done. He also would like Dr. Erika Ellis to refer him to a lung doctor since he is having a hard time breathing.       You can reach Madhavi Bay at #[phone]

## 2022-02-08 NOTE — TELEPHONE ENCOUNTER
[facility], [address] Placedo Angle Shells \"Ali\", MD  [address] [facility], [place] [address]  Phone: [phone]    Please set pt up with me next 40 min apt

## 2022-02-08 NOTE — TELEPHONE ENCOUNTER
Pt called back states that he spoke to the doctors in [facility] and they will not send the records to the office without him filling out a records release. Advised that this is how all offices will be. Dr Mathew Morataya is the physician he seen.   [phone]

## 2022-02-08 NOTE — TELEPHONE ENCOUNTER
[facility] hosp at [facility]. States that he went to the hosp because he had 25 mins of fibulation. The pacemaker people called him and told him to go to the ER. The ER advised that because when he walks he is having trouble breathing. States that when he walks he gets chest pains and dizzy and SOB. States that he is not able to walk more than 50 ft. Pt states that dr Pamela Wang office has all the records. There is something from 12-30-21. Not sure if this is what you are needing.

## 2022-03-24 NOTE — PROGRESS NOTES
Cardiac Electrophysiology Consultation   Date: 3/28/2022  Reason for Consultation: Device Management/ atrial fibrillation  Consult Requesting Physician: Kylie Lentz MD  Primary Care Physician: Win Ta MD    Chief Complaint:   Chief Complaint   Patient presents with    Follow-up     Symptomatic advanced heart block - device check -  SOB        HPI: Jak Terry is a 67 y.o. patient with a history of HTN who was initially seen in consultation on 10/31/16 for bradycardia. He had a long standing history of asymptomatic bradycardia that was not limiting his activity. It was decided to defer a PPM and montor-- however, one year later he reported progressive symptoms and decided to undergo Biotronik 2-chamber PPM implant 7/2017. He was seen in office on 9/30/2019  for management of his device. He reported he is feeling well with the current setting of his pacemaker at DDDR. He stated that when his device was changed to VVI rate of 30 bpm he became lightheaded and passed out, thereby requiring a re-programming of the device to the current setting of DDDR. He is compliant with his medications and tolerating them well. He denies chest pain/pressure, tightness, edema, shortness of breath, heart racing, palpitations, lightheadedness, dizziness, syncope, presyncope,  PND or orthopnea. Interval History: Today, he arrives for a follow up for device management and discuss new onset atrial fibrillation. He states symptoms of shortness of breath on exertion. He states that he underwent LHC in [place] in January 2022. Patient does not endorse any identifiable exacerbating or alleviating factors for the atrial flutter and or atrial fibrillation. He is compliant with his medications and tolerating them well. He denies chest pain/pressure, tightness, edema, shortness of breath, heart racing, palpitations, lightheadedness, dizziness, syncope, presyncope,  PND or orthopnea.      Past Medical History:   Diagnosis Date    Advance directive discussed with patient     DNR - Has Living will    Allergic rhinitis     Arrhythmia     s/p pacemaker for heart block: Dr. Mayen Reveal 7/31/17    Benign essential tremor 2/27/2017    Brachial neuritis     Chronic back pain     Erectile dysfunction 2/13/2012    History of tobacco abuse 10/1/2019    > 50 pack year    Hypertension 2/13/2012    Parkinson disease ([address]     Dr. Terese Ryan Symptomatic advanced heart block         Past Surgical History:   Procedure Laterality Date    CERVICAL LAMINECTOMY  1995    ACDF    LUMBAR LAMINECTOMY  1964    with Boswell ankur       Allergies:  No Known Allergies    Medication:   Prior to Admission medications    Medication Sig Start Date End Date Taking? Authorizing Provider   tamsulosin (FLOMAX) 0.4 MG capsule Take 0.4 mg by mouth daily   Yes Historical Provider, MD   atorvastatin (LIPITOR) 40 MG tablet Take 1 tablet by mouth nightly 3/28/22  Yes Karla Muniz MD   propranolol (INDERAL LA) 60 MG extended release capsule Take 1 capsule by mouth daily 1/20/22  Yes Shahriar French MD   apixaban (ELIQUIS) 5 MG TABS tablet Take 1 tablet by mouth 2 times daily 1/20/22  Yes Shahriar French MD   carbidopa-levodopa (SINEMET CR)  MG per extended release tablet Take 1 tablet by mouth 3 times daily   Yes Historical Provider, MD   isosorbide mononitrate (IMDUR) 30 MG extended release tablet Take 1 tablet by mouth daily  Patient not taking: Reported on 3/28/2022 1/13/22   Shahriar French MD       Social History:   reports that he quit smoking about 15 years ago. He smoked 0.00 packs per day for 40.00 years. He has quit using smokeless tobacco. He reports current alcohol use of about 15.0 standard drinks of alcohol per week. He reports that he does not use drugs. Family History:  family history includes Other in his father; Stroke in his mother. Reviewed.  Denies family history of sudden cardiac death, arrhythmia, premature CAD    Review of System:    · General ROS: negative for - chills, fever   · Psychological ROS: negative for - anxiety or depression  · Ophthalmic ROS: negative for - eye pain or loss of vision  · ENT ROS: negative for - epistaxis, headaches, nasal discharge, sore throat   · Allergy and Immunology ROS: negative for - hives, nasal congestion   · Hematological and Lymphatic ROS: negative for - bleeding problems, blood clots, bruising or jaundice  · Endocrine ROS: negative for - skin changes, temperature intolerance or unexpected weight changes  · Respiratory ROS: negative for - cough, hemoptysis, pleuritic pain, SOB, sputum changes or wheezing  · Cardiovascular ROS: Per HPI. · Gastrointestinal ROS: negative for - abdominal pain, blood in stools, diarrhea, hematemesis, melena, nausea/vomiting or swallowing difficulty/pain  · Genito-Urinary ROS: negative for - dysuria or incontinence  · Musculoskeletal ROS: negative for - joint swelling or muscle pain  · Neurological ROS: negative for - confusion, dizziness, gait disturbance, headaches, numbness/tingling, seizures, speech problems, tremors, visual changes or weakness  · Dermatological ROS: negative for - rash    Physical Examination:  Vitals:    03/28/22 0807   BP: 128/80   Pulse: 69   SpO2: 99%       · Constitutional: Oriented. No distress. · Head: Normocephalic and atraumatic. · Mouth/Throat: Oropharynx is clear and moist.   · Eyes: Conjunctivae normal. EOM are normal.   · Neck: Normal range of motion. Neck supple. No rigidity. No JVD present. · Cardiovascular: Normal rate, regular rhythm, S1&S2 and intact distal pulses. · Pulmonary/Chest: Bilateral respiratory sounds. No wheezes. No rhonchi. · Abdominal: Soft. Bowel sounds present. No distension, No tenderness. · Musculoskeletal: No tenderness. No edema    · Lymphadenopathy: Has no cervical adenopathy. · Neurological: Alert and oriented. Cranial nerve appears intact, No Gross deficit   · Skin: Skin is warm and dry. No rash noted. · Psychiatric: Has a normal mood, affect and behavior     Labs:  Reviewed. ECG: Sinus  rhythm with ventricular paced complexes that track sinus with v-rate of 70's bpm with QRS duration 128 ms. Occasional PVC. Studies:   1. Event monitor:   None    2. Echo: 2/2012  Normal study. LVEF 60%    3. Stress Test: 9/13/2021  Summary    Pharmacological Stress/MPI Results:        1. Technically a satisfactory study. 2. No evidence of Ischemia by Myocardial Perfusion Imaging. 3. Gated Study shows normal LV size and Systolic function; EF is 08%. 4. Cath: 1/12/2022  Angiography showed:   1. Left main normal gives rise to LAD and left circumflex. 2. Medium size vessel that wraps around the apex gives rise to medium size diagonal branch. There is mild disease proximal LAD. LAD continues to wraparound apex. 3. Left circumflex large dominant vessel gives rise to 2 small OM 1 and 2 branches, medium size OM3 branch and left PDA. No stenosis present. 4. RCA codominant. Has mild nonobstructive disease proximally. LV filling pressure. LVEDP is 8 mmHg   No AS   No complications. Ventricular ectopy present        5. Carotid US 9/13/2021  No hemodynamically significant carotid stenosis noted on either side. I independently reviewed and interpreted the ECG, MCOT, echocardiogram, stress test, and coronary angiography/PCI results and used them for my plan of care. Procedures:  1. Biotronik dual chamber PPM implant on 8/7/2017 with Dr. Camden Nath    Assessment/Plan:     Atrial fibrillation  Atrial Flutter   -Symptomatic with shortness of breath.   -New onset atrial fibrillationfirst seen on device interrogation 12/23/2021.  -New onset atrial flutter seen on device interrogation today.   -He has a [id number] Score 2 (AGE). -Continue Eliquis 5 mg BID for  thromboembolic risk reduction.  -Tolerating well no signs or symptoms of abnormal bruising or bleeding.     We educated the patient that atrial fibrillation is a worsening and progressive disease, with more frequent episodes that will ensue. Subsequent episodes usually become more sustained to the extent that many individuals would then develop persistent atrial fibrillation. Once persistence is reached, permanent atrial fibrillation is inevitable. We also discussed the fact that atrial fibrillation is associated with stroke, including life-threatening stroke, and therefore oral anticoagulation is warranted depending on the patient's [id number] score. We discussed different management options for atrial fibrillation including their risks and benefits. These options include use of cardioversion (mainly for persisting atrial fibrillation or atrial flutter) which provides an effective immediate therapy with success rates of 75% or higher, but it provides no short nor long term efficacy. Anti-arrhythmic medications provide a very effective short term therapy, but even with our most potent anti-arrhythmic medication there is limited long term efficacy (clinical studies have shown that 40% of patients remain atrial fibrillation-free after 4 years of follow-up after starting one of the more powerful anti-arrhythmic medication (amiodarone), and, if extrapolated, may have further diminishing success as time goes on). Atrial fibrillation ablation is a potentially curative therapy with very reasonable success rate after a first time procedure and with improving success rates with subsequent procedures. The risks, benefits and alternatives of the atrial fibrillation ablation procedure were discussed with the patient.  The risks including, but not limited to, bleeding, infection, radiation exposure, injury to vascular, cardiac and surrounding structures (including pneumothorax), stroke, cardiac perforation, tamponade, need for emergent open heart surgery, need for pacemaker implantation, injury to the phrenic nerve, injury to the esophagus, myocardial infarction and death were discussed in detail. The patient was also counseled at length about the risks of jennifer Covid-19 in the greg-operative and post-operative states including the recovery window of their procedure. The patient was made aware that jennifer Covid-19 after a surgical procedure may worsen their prognosis for recovering from the virus and lend to a higher morbidity and or mortality risk. The patient was given the option of postponing their procedure. The patient was also presented reasonable alternatives to the proposed care, treatment, and services. The discussion I have had with the patient encompassed risks, benefits, and side effects related to the alternatives and the risks related to not receiving the proposed care, treatment and services. I spent 40 minutes face to face with the patient, with greater than 50% of that time spent in counseling on the above. The patient opted to proceed with the atrial fibrillation ablation. We will schedule for a radiofrequency ablation with [employer] system with a NIKKO procedure immediately prior to this ablation. A cardiac CTA will be ordered for pulmonary vein mapping prior to this procedure. We will hold Eliquis  for 12 hours prior to procedure. We will order BMP, CBC, and Type & Screen prior to the procedure.     -Encouraged to watch \"That Sugar Film\" on [employer], which discusses the negative impact of processed sugar has on the body.    -Patient educated to eat a diet which includes organic fruits, vegetables and meats. -Much time was spent counseling the patient on healthier lifestyle, following a low sodium diet <2400 mg of sodium a day and dramatically decreasing the intake of processed sugar.     Symptomatic advanced heart block  -S/p Biotronik PPM implant 8/7/2017  -Device interrogated  today and based on threshold, impedance, and intrinsic sensing tests run today, the device appears to be functioning normally.  -Remaining battery life is 5 years 4 months. -AP 57%  - 80%    Parkinson disease  -Managed by physician in [place]. PRECIADO   -Upcoming appointment with Pulmonology. CAD  -Left heart cath on 1/12/2022 showed mild nonobstructive disease proximally.   -Following with Dr. Ana Maria Lucas MD.    Follow ups: Follow up three months after procedure with Makenzie Clarke CNP. Continue routine follow up with Dr. Ana Maria Lucas MD.    Thank you for allowing me to participate in the care of Lilliam Terry. All questions and concerns were addressed to the patient/family. Alternatives to my treatment were discussed. This note was scribed in the presence of Dr. Murtaza Burgess MD by Clarice Main RN. The scribe's documentation has been prepared under my direction and personally reviewed by me in its entirety. I confirm that the note above accurately reflects all work, physical examination, the discussion of treatments and procedures, and medical decision making performed by me.     Murtaza Burgess MD, MS, [facility], [facility]  Cardiac Electrophysiology  [address]  [address] [facility], [address] [place] [address]  [address]  [phone]

## 2022-03-28 ENCOUNTER — OFFICE VISIT (OUTPATIENT)
Dept: CARDIOLOGY CLINIC | Age: 77
End: 2022-03-28
Payer: MEDICARE

## 2022-03-28 ENCOUNTER — NURSE ONLY (OUTPATIENT)
Dept: CARDIOLOGY CLINIC | Age: 77
End: 2022-03-28
Payer: MEDICARE

## 2022-03-28 VITALS
HEART RATE: 69 BPM | WEIGHT: 174 LBS | HEIGHT: 71 IN | DIASTOLIC BLOOD PRESSURE: 80 MMHG | BODY MASS INDEX: 24.36 KG/M2 | SYSTOLIC BLOOD PRESSURE: 128 MMHG | OXYGEN SATURATION: 99 %

## 2022-03-28 DIAGNOSIS — G20 PARKINSON DISEASE (HCC): ICD-10-CM

## 2022-03-28 DIAGNOSIS — R00.1 SYMPTOMATIC BRADYCARDIA: ICD-10-CM

## 2022-03-28 DIAGNOSIS — I48.3 TYPICAL ATRIAL FLUTTER (HCC): ICD-10-CM

## 2022-03-28 DIAGNOSIS — Z79.01 CURRENT USE OF LONG TERM ANTICOAGULATION: ICD-10-CM

## 2022-03-28 DIAGNOSIS — I48.0 PAF (PAROXYSMAL ATRIAL FIBRILLATION) (HCC): Primary | ICD-10-CM

## 2022-03-28 DIAGNOSIS — Z95.0 PACEMAKER: ICD-10-CM

## 2022-03-28 DIAGNOSIS — I44.1 SYMPTOMATIC ADVANCED HEART BLOCK: ICD-10-CM

## 2022-03-28 DIAGNOSIS — G25.0 BENIGN ESSENTIAL TREMOR: ICD-10-CM

## 2022-03-28 DIAGNOSIS — R06.09 DOE (DYSPNEA ON EXERTION): ICD-10-CM

## 2022-03-28 PROCEDURE — 99215 OFFICE O/P EST HI 40 MIN: CPT | Performed by: INTERNAL MEDICINE

## 2022-03-28 PROCEDURE — 93280 PM DEVICE PROGR EVAL DUAL: CPT | Performed by: INTERNAL MEDICINE

## 2022-03-28 RX ORDER — TAMSULOSIN HYDROCHLORIDE 0.4 MG/1
0.4 CAPSULE ORAL DAILY
COMMUNITY
End: 2022-10-11

## 2022-03-28 RX ORDER — ATORVASTATIN CALCIUM 40 MG/1
40 TABLET, FILM COATED ORAL NIGHTLY
Qty: 30 TABLET | Refills: 5 | Status: SHIPPED | OUTPATIENT
Start: 2022-03-28 | End: 2022-09-07

## 2022-03-28 NOTE — PATIENT INSTRUCTIONS
Call your insurance company to see which of the following medications are covered under your insurance plan. · Eliquis  · Warfarin (requires routine frequent blood work for monitoring)  · Pradaxa  · Xarelto   ------------------------------------------------------------------------------------------------------    If you have any question regarding your  Ablation please contact Dr. Tripp Lino Nurse Kathie Polanco at [phone]. CTA Pre procedure instructions      As part of your pre procedure work up you will need to get a CT scan of your heart. This scan is completed in order to give Dr. Nhi Arellano a map of the atrium (chamber of your heart) where he will be doing the ablation. Date:_________________________________    Time:_________________________________    Lakshmi Iraheta 20 minutes prior to scheduled testing time  -Nothing to eat or drink for at least 4 hours prior to test    Atrial Fibrillation & Right Atrial Flutter Ablation Pre procedure Instructions    Date: 4-    Arrive at:  6:30 am    Procedure time: 7:45 am    The morning of your procedure you will park in the hospital parking lot and report directly to the cath lab to check in. At the information desk stay right and go all the way to the end of the staley, this will take you directly to your check in desk for the cath lab. Pre-Procedure Instructions   1. You will need to fast (nothing to eat or drink) after midnight the day of your procedure  2. Do NOT chew gum or eat mints the day of your procedure. 3. You will need to hold your Eliquis for 12 hours prior to the procedure. 4. Do not use any lotions, creams or perfume the morning of procedure. 5. You will need to complete pre-procedure lab work 5-7 days prior to your procedure. 6. Please have a responsible adult to drive you home after procedure, you should go home same day, but there is always a possibility of an overnight stay.   7. Cath lab will provide you with all post procedure instructions  8. A 3 month follow up will be scheduled with Dr. Nilson Diallo Nurse practitioner Cherise Beckman, ROBERTO post procedure                                      [facility] services  [address] [place] [address] Drive. [address] [place] [address] Sherman Lucas  Phone: [phone]  The hours are Mon -Fri. 6:30 am - 4:00 pm   Saturday 8:00 am - noon  No appointment necessary              Patient Education        Learning About Atrial Fibrillation  What is atrial fibrillation? Atrial fibrillation (say \"AY-tree-usha nat-kztz-DSV-shun\") is a common type of irregular heartbeat (arrhythmia). Normally, the heart beats in a strong, steady rhythm. In atrial fibrillation, a problem with the heart's electrical system causes the two upper chambers of the heart (called the atria) to quiver, or fibrillate. Atrial fibrillation can be dangerous. This is because if the heartbeat isn't strong and steady, blood can collect, or pool, in the atria. And pooled blood is more likely to form clots. Clots can travel to the brain, block blood flow, and cause a stroke. Atrial fibrillation can also lead to heart failure. This condition also upsets the normal rhythm between the atria and the lower chambers of the heart. (These chambers are called the ventricles.) The ventricles may beat fast and without a regular rhythm. What are the symptoms? Some people feel symptoms when they have episodes of atrial fibrillation. But other people don't notice any symptoms. If you have symptoms, you may feel:  · A fluttering, racing, or pounding feeling in your chest called palpitations. · Weak or tired. · Dizzy or lightheaded. · Short of breath. · Chest pain. · Confused. You may notice signs of atrial fibrillation when you check your pulse. Your pulse may seem uneven or fast.  What can you expect when you have atrial fibrillation? At first, spells of atrial fibrillation may come on suddenly and last a short time.  They may alcohol or drug use, talk to your doctor. Manage stress. Options like yoga, biofeedback, and meditation may help. Where can you learn more? Go to https://MetastormpepicewRiskified.bop.fm. org and sign in to your Global CIO account. Enter W620 in the SwitchForce box to learn more about \"Learning About Atrial Fibrillation. \"     If you do not have an account, please click on the \"Sign Up Now\" link. Current as of: April 29, 2021               Content Version: 13.1  © 8018-4651 demandmart. Care instructions adapted under license by [facility] ([facility]). If you have questions about a medical condition or this instruction, always ask your healthcare professional. [place] [address] any warranty or liability for your use of this information. Patient Education        Atrial Fibrillation: Care Instructions  Your Care Instructions     Atrial fibrillation is an irregular and often fast heartbeat. Treating this condition is important for several reasons. It can cause blood clots, which can travel from your heart to your brain and cause a stroke. If you have a fast heartbeat, you may feel lightheaded, dizzy, and weak. An irregular heartbeat can also increase your risk for heart failure. Atrial fibrillation is often the result of another heart condition, such as high blood pressure or coronary artery disease. Making changes to improve your heart condition will help you stay healthy and active. Follow-up care is a key part of your treatment and safety. Be sure to make and go to all appointments, and call your doctor if you are having problems. It's also a good idea to know your test results and keep a list of the medicines you take. How can you care for yourself at home? Medicines    · Take your medicines exactly as prescribed. Call your doctor if you think you are having a problem with your medicine.  You will get more details on the specific medicines your doctor prescribes.     · If your doctor has given you a blood thinner to prevent a stroke, be sure you get instructions about how to take your medicine safely. Blood thinners can cause serious bleeding problems.     · Do not take any vitamins, over-the-counter drugs, or herbal products without talking to your doctor first.   Lifestyle changes    · Do not smoke. Smoking can increase your chance of a stroke and heart attack. If you need help quitting, talk to your doctor about stop-smoking programs and medicines. These can increase your chances of quitting for good.     · Eat a heart-healthy diet.     · Stay at a healthy weight. Lose weight if you need to.     · Limit alcohol to 2 drinks a day for men and 1 drink a day for women. Too much alcohol can cause health problems.     · Avoid colds and flu. Get a pneumococcal vaccine shot. If you have had one before, ask your doctor whether you need another dose. Get a flu shot every year. If you must be around people with colds or flu, wash your hands often. Activity    · If your doctor recommends it, get more exercise. Walking is a good choice. Bit by bit, increase the amount you walk every day. Try for at least 30 minutes on most days of the week. You also may want to swim, bike, or do other activities. Your doctor may suggest that you join a cardiac rehabilitation program so that you can have help increasing your physical activity safely.     · Start light exercise if your doctor says it is okay. Even a small amount will help you get stronger, have more energy, and manage stress. Walking is an easy way to get exercise. Start out by walking a little more than you did in the hospital. Gradually increase the amount you walk.     · When you exercise, watch for signs that your heart is working too hard. You are pushing too hard if you cannot talk while you are exercising. If you become short of breath or dizzy or have chest pain, sit down and rest immediately.     · Check your pulse regularly.  Place two fingers on the artery at the palm side of your wrist, in line with your thumb. If your heartbeat seems uneven or fast, talk to your doctor. When should you call for help? Call 911 anytime you think you may need emergency care. For example, call if:    · You have symptoms of a heart attack. These may include:  ? Chest pain or pressure, or a strange feeling in the chest.  ? Sweating. ? Shortness of breath. ? Nausea or vomiting. ? Pain, pressure, or a strange feeling in the back, neck, jaw, or upper belly or in one or both shoulders or arms. ? Lightheadedness or sudden weakness. ? A fast or irregular heartbeat. After you call 911, the  may tell you to chew 1 adult-strength or 2 to 4 low-dose aspirin. Wait for an ambulance. Do not try to drive yourself.     · You have symptoms of a stroke. These may include:  ? Sudden numbness, tingling, weakness, or loss of movement in your face, arm, or leg, especially on only one side of your body. ? Sudden vision changes. ? Sudden trouble speaking. ? Sudden confusion or trouble understanding simple statements. ? Sudden problems with walking or balance. ? A sudden, severe headache that is different from past headaches.     · You passed out (lost consciousness). Call your doctor now or seek immediate medical care if:    · You have new or increased shortness of breath.     · You feel dizzy or lightheaded, or you feel like you may faint.     · Your heart rate becomes irregular.     · You can feel your heart flutter in your chest or skip heartbeats. Tell your doctor if these symptoms are new or worse. Watch closely for changes in your health, and be sure to contact your doctor if you have any problems. Where can you learn more? Go to https://StypipeCE Info Systems.Cobiscorp. org and sign in to your CINEPASS account. Enter U020 in the BioTrove box to learn more about \"Atrial Fibrillation: Care Instructions. \"     If you do not have an account, please click on the \"Sign Up Now\" link. Current as of: April 29, 2021               Content Version: 13.1  © 2006-2021 [email or web address]. Care instructions adapted under license by [facility] ([facility]). If you have questions about a medical condition or this instruction, always ask your healthcare professional. [address] any warranty or liability for your use of this information. Patient Education        Learning About Catheter Ablation for Heart Rhythm Problems  What is catheter ablation? Catheter ablation is a procedure that treats heart rhythm problems. These problems include atrial fibrillation, supraventricular tachycardia (SVT), atrial flutter, and ventricular tachycardia. Your heart should have a strong, steady beat. That beat is controlled by the heart's electrical system. Sometimes that system misfires. This causes a heartbeat that is too fast and isn't steady. Catheter ablation is a way to get into your heart and fix the problem. Ablation is not surgery. How is catheter ablation done? Your doctor inserts thin tubes called catheters into a blood vessel in your groin, arm, or neck. Then your doctor feeds them into the heart. Wires in the catheters help the doctor find the problem areas. Then the doctor uses the wires to send energy to destroy the tiny areas of heart tissue that are causing the problems. It may seem like a bad idea to destroy parts of your heart on purpose. But the areas that are destroyed are very tiny. They should not affect your heart's ability to do its job. You may be awake during the procedure. Or you may be asleep. The doctor will give you medicines to help you feel relaxed and to numb the areas where the catheters go in. You may feel a little uncomfortable, but you should not feel pain. What can you expect after catheter ablation?   You may stay overnight in the hospital. How long you stay in the hospital depends on the type of ablation you have.  Do not exercise hard or lift anything heavy for a week. You will probably be able to go back to work and to your normal routine in 1 or 2 days. You may have swelling, bruising, or a small lump around the site where the catheters went into your body. These should go away in 3 to 4 weeks. You may have to take some medicines for a while. Follow-up care is a key part of your treatment and safety. Be sure to make and go to all appointments, and call your doctor if you are having problems. It's also a good idea to know your test results and keep a list of the medicines you take. Where can you learn more? Go to https://Aristos Logic.Goodybag. org and sign in to your [email or web address] account. Enter Y309 in the HAKIM Information Technology box to learn more about \"Learning About Catheter Ablation for Heart Rhythm Problems. \"     If you do not have an account, please click on the \"Sign Up Now\" link. Current as of: April 29, 2021               Content Version: 13.1  © 2006-2021 Torsion Mobile. Care instructions adapted under license by [facility] ([facility]). If you have questions about a medical condition or this instruction, always ask your healthcare professional. [place] [address] any warranty or liability for your use of this information. Patient Education        Electrophysiology Study and Catheter Ablation: Before Your Procedure  What is an electrophysiology study and catheter ablation? An electrophysiology study is a test to see if there is a problem with your heart rhythm and to find out how to fix it. It is also called an EP study. A catheter ablation procedure is sometimes done at the same time. This procedure destroys (ablates) small areas of your heart that are causing your heart rhythm problem. The doctor puts plastic tubes called catheters into blood vessels in your groin, arm, or neck.  The doctor then uses an X-ray machine to guide long wires through the tubes to your heart.  The doctor can use these wires to record your heart's electrical signals. If a problem with your heart can be fixed with ablation, the doctor can use the wires to destroy a small part of your heart tissue. This is most often done with radio waves. You may get medicine that relaxes you or puts you in a light sleep. Or you might be asleep during the procedure. The places where the catheters go in will be numb. An EP study and ablation can take 2 to 6 hours. In rare cases, it can take longer. If you have an EP study only and you don't need more treatment, you may go home the same day. But if you also have ablation, you may stay overnight in the hospital.  How do you prepare for the procedure? Procedures can be stressful. This information will help you understand what you can expect. And it will help you safely prepare for your procedure. Preparing for the procedure    · Be sure you have someone to take you home. Anesthesia and pain medicine will make it unsafe for you to drive or get home on your own.     · Understand exactly what procedure is planned, along with the risks, benefits, and other options.     · Tell your doctor ALL the medicines, vitamins, supplements, and herbal remedies you take. Some may increase the risk of problems during your procedure. Your doctor will tell you if you should stop taking any of them before the procedure and how soon to do it.     · If you take aspirin or some other blood thinner, ask your doctor if you should stop taking it before your procedure. Make sure that you understand exactly what your doctor wants you to do. These medicines increase the risk of bleeding.     · Make sure your doctor and the hospital have a copy of your advance directive. If you don't have one, you may want to prepare one. It lets others know your health care wishes. It's a good thing to have before any type of surgery or procedure. What happens on the day of the procedure?    · Follow the instructions exactly about when to stop eating and drinking. If you don't, your procedure may be canceled. If your doctor told you to take your medicines on the day of the procedure, take them with only a sip of water.     · Take a bath or shower before you come in for your procedure. Do not apply lotions, perfumes, deodorants, or nail polish.     · Take off all jewelry and piercings. And take out contact lenses, if you wear them. At the hospital or surgery center   · Bring a picture ID.     · You will be kept comfortable and safe by your anesthesia provider. You may get medicine that relaxes you or puts you in a light sleep. The area being worked on will be numb. Or the anesthesia may make you sleep.     · This procedure can take 2 to 6 hours. In rare cases, it can take longer.     · After the procedure, pressure may be applied to the areas where a catheter was put in a blood vessel. This will help prevent bleeding. A small device may also be used to close the blood vessel. You may have a bandage or a compression device on each catheter site.     · Nurses will check your heart rate and blood pressure. The nurse will also check the catheter site for bleeding.     · If the catheter was put in your groin, you will need to lie still and keep your leg straight for up to a few hours. The nurse may put a weighted bag on your leg to help you keep it still.     · If the catheter was put in your neck or arm, you may be able to sit up right away. If it was in your arm, you will need to keep your arm still for at least 1 hour.     · You may have a bruise or a small lump where the catheter was put in your blood vessel. This is normal and will go away. When should you call your doctor?    · You have questions or concerns.     · You don't understand how to prepare for your procedure.     · You become ill before the procedure (such as fever, flu, or a cold).     · You need to reschedule or have changed your mind about having the procedure. Where can you learn more? Go to https://chpepiceweb.Family Housing Investments. org and sign in to your [employer] account. Enter W725 in the [facility] box to learn more about \"Electrophysiology Study and Catheter Ablation: Before Your Procedure. \"     If you do not have an account, please click on the \"Sign Up Now\" link. Current as of: April 29, 2021               Content Version: 13.1  © 2006-2021 [email or web address]. Care instructions adapted under license by [facility] ([facility]). If you have questions about a medical condition or this instruction, always ask your healthcare professional. [place] [address] any warranty or liability for your use of this information. Patient Education        Electrophysiology Study and Catheter Ablation: What to Expect at [address] [place] [address] Drive had an electrophysiology study for a problem with your heartbeat. You may also have had a catheter ablation to try to correct the problem. You may have swelling, bruising, or a small lump around the sites where the catheters went into your body. These should go away in 3 to 4 weeks. You can do light activities at home. Don't do anything strenuous until your doctor says it is okay. This may be for several days. This care sheet gives you a general idea about how long it will take for you to recover. But each person recovers at a different pace. Follow the steps below to get better as quickly as possible. How can you care for yourself at home? Activity    · If the doctor gave you a sedative:  ? For 24 hours, don't do anything that requires attention to detail, such as going to work, making important decisions, or signing any legal documents. It takes time for the medicine's effects to completely wear off.  ? For your safety, do not drive or operate any machinery that could be dangerous.  Wait until the medicine wears off and you can think clearly and react easily.     · Do not do strenuous exercise and do not lift, pull, or push anything heavy until your doctor says it is okay. This may be for several days.     · Ask your doctor when it is okay to have sex. Diet    · You can eat your normal diet. If your stomach is upset, try bland, low-fat foods like plain rice, broiled chicken, toast, and yogurt.     · Drink plenty of fluids (unless your doctor tells you not to). Medicines    · Your doctor will tell you if and when you can restart your medicines. You will also get instructions about taking any new medicines.     · If you take aspirin or some other blood thinner, ask your doctor if and when to start taking it again. Make sure that you understand exactly what your doctor wants you to do.     · Be safe with medicines. Take your medicines exactly as prescribed. Call your doctor if you think you are having a problem with your medicine. Catheter site care    · For 1 or 2 days, keep a bandage over the spot where the catheter was inserted. The bandage probably will fall off in this time.     · Put ice or a cold pack on the area for 10 to 20 minutes at a time to help with soreness or swelling. Put a thin cloth between the ice and your skin.     · You may shower 24 to 48 hours after the procedure, if your doctor okays it. Pat the incision dry.     · Do not soak the catheter site until it is healed. Don't take a bath for 1 week, or until your doctor tells you it is okay.     · Watch for bleeding from the site. A small amount of blood (up to the size of a quarter) on the bandage can be normal.     · If you are bleeding, lie down and press on the area for 15 minutes to try to make it stop. If the bleeding does not stop, call your doctor or seek immediate medical care. Follow-up care is a key part of your treatment and safety. Be sure to make and go to all appointments, and call your doctor if you are having problems.  It's also a good idea to know your test results and keep a list of the medicines you take.  When should you call for help? Call 911  anytime you think you may need emergency care. For example, call if:    · You passed out (lost consciousness).     · You have symptoms of a heart attack. These may include:  ? Chest pain or pressure, or a strange feeling in the chest.  ? Sweating. ? Shortness of breath. ? Nausea or vomiting. ? Pain, pressure, or a strange feeling in the back, neck, jaw, or upper belly, or in one or both shoulders or arms. ? Lightheadedness or sudden weakness. ? A fast or irregular heartbeat. After you call 911, the  may tell you to chew 1 adult-strength or 2 to 4 low-dose aspirin. Wait for an ambulance. Do not try to drive yourself.     · You have symptoms of a stroke. These may include:  ? Sudden numbness, tingling, weakness, or loss of movement in your face, arm, or leg, especially on only one side of your body. ? Sudden vision changes. ? Sudden trouble speaking. ? Sudden confusion or trouble understanding simple statements. ? Sudden problems with walking or balance. ? A sudden, severe headache that is different from past headaches. Call your doctor now or seek immediate medical care if:    · You are bleeding from the area where the catheter was put in your blood vessel.     · You have a fast-growing, painful lump at the catheter site.     · You have signs of infection, such as:  ? Increased pain, swelling, warmth, or redness. ? Red streaks leading from the catheter site. ? Pus draining from the catheter site. ? A fever.     · Your leg, arm, or hand is painful, looks blue, or feels cold, numb, or tingly. Watch closely for any changes in your health, and be sure to contact your doctor if you have any problems. Where can you learn more? Go to https://pSividapezahiraeb.Oyster. org and sign in to your [email or web address] account. Enter [phone] in the [facility] box to learn more about \"Electrophysiology Study and Catheter Ablation: What to Expect at Home. \" If you do not have an account, please click on the \"Sign Up Now\" link. Current as of: April 29, 2021               Content Version: 13.1  © 3248-6100 Healthwise, Incorporated. Care instructions adapted under license by [facility] ([facility]). If you have questions about a medical condition or this instruction, always ask your healthcare professional. [address] any warranty or liability for your use of this information.

## 2022-03-28 NOTE — PROGRESS NOTES
Pt seen in clinic today for cardiac device interrogation to address new onset Afib. Their device is a BTK 2 chamber PPM implanted for CHB   Based on threshold, impedance, and intrinsic sensing tests run today, the device appears to be functioning normally. However, pt notes that after having a seisure last year he feels \"my hear beat in my throat and chest pain\" whenever he exerts himself. Pt with rate response turned on rather than [employer]'s CLS. no changes made at this time. Remaining battery life is 5y4m  AP 57%                  80%      device lists AT/AF burden of 0% - however, since 10/30/2020 - pt appears to have had 19 episodes afib - the longest being 1/19/2022 [id number] - see PDF for EGM's    Pt was informed of findings today and general questions have been answered with regard to device. Home monitoring hardware is transmitting on schedule. Results discussed with or to be reviewed by EP    Pt to see Dr. Julia Phillips in clinic today following their device check.

## 2022-04-04 ENCOUNTER — HOSPITAL ENCOUNTER (OUTPATIENT)
Dept: CT IMAGING | Age: 77
Discharge: HOME | End: 2022-04-04
Payer: MEDICARE

## 2022-04-04 ENCOUNTER — OFFICE VISIT (OUTPATIENT)
Dept: PULMONOLOGY | Age: 77
End: 2022-04-04
Payer: MEDICARE

## 2022-04-04 ENCOUNTER — TELEPHONE (OUTPATIENT)
Dept: CARDIOLOGY CLINIC | Age: 77
End: 2022-04-04

## 2022-04-04 VITALS
WEIGHT: 171 LBS | TEMPERATURE: 97.8 F | BODY MASS INDEX: 24.48 KG/M2 | RESPIRATION RATE: 21 BRPM | HEART RATE: 64 BPM | OXYGEN SATURATION: 98 % | DIASTOLIC BLOOD PRESSURE: 60 MMHG | SYSTOLIC BLOOD PRESSURE: 85 MMHG | HEIGHT: 70 IN

## 2022-04-04 DIAGNOSIS — I48.3 TYPICAL ATRIAL FLUTTER (HCC): ICD-10-CM

## 2022-04-04 DIAGNOSIS — R06.02 SOB (SHORTNESS OF BREATH) ON EXERTION: Primary | ICD-10-CM

## 2022-04-04 DIAGNOSIS — I48.0 PAROXYSMAL ATRIAL FIBRILLATION (HCC): ICD-10-CM

## 2022-04-04 DIAGNOSIS — I48.0 PAF (PAROXYSMAL ATRIAL FIBRILLATION) (HCC): ICD-10-CM

## 2022-04-04 LAB
GFR AFRICAN AMERICAN: 51
GFR NON-AFRICAN AMERICAN: 42
PERFORMED ON: ABNORMAL
POC CREATININE: 1.6 MG/DL (ref 0.8–1.3)
POC SAMPLE TYPE: ABNORMAL

## 2022-04-04 PROCEDURE — 99204 OFFICE O/P NEW MOD 45 MIN: CPT | Performed by: INTERNAL MEDICINE

## 2022-04-04 PROCEDURE — 75574 CT ANGIO HRT W/3D IMAGE: CPT

## 2022-04-04 PROCEDURE — 82565 ASSAY OF CREATININE: CPT

## 2022-04-04 PROCEDURE — 6360000004 HC RX CONTRAST MEDICATION: Performed by: INTERNAL MEDICINE

## 2022-04-04 RX ADMIN — IOPAMIDOL 75 ML: 755 INJECTION, SOLUTION INTRAVENOUS at 07:55

## 2022-04-04 NOTE — TELEPHONE ENCOUNTER
Called/spoke to pt, also informed to get a printout of medicines he pays for [facility]'S AND [facility] and include that with the patient assistance víctor. Reminded pt when completed to drop off form to our office for the doctor portion to be completed and then we will fax víctor to [facility].  V/u.

## 2022-04-04 NOTE — PROGRESS NOTES
Pulmonary Critical Care Consult           REASON FOR CONSULTATION:  Chief Complaint   Patient presents with    Establish Care        Consult at request of Daron Womack MD     PCP: Daron Womack MD        Assessment and Plan:   Diagnosis Orders   1. SOB (shortness of breath) on exertion  Full PFT Study With Bronchodilator   2. Paroxysmal atrial fibrillation (HCC)         Plan:  I believe the symptoms are cardiac in origin related to his atrial fibrillation, we will obtain PFT to rule out any underlying lung disease, he is scheduled for atrial fibrillation ablation on the 27th. HISTORY OF PRESENT ILLNESS: Maria Alejandra Terry is very pleasant 67y.o. year old gentleman with medical history stated below significant for former smoker quit 15 years ago, worked in construction, history of bradycardia, status post pacemaker placement 2017, history of paroxysmal atrial fibrillation started recently, underwent left heart cath was negative for any significant coronary disease,, was referred to us for shortness of breath with exertion. Has shortness of breath episodic mainly with exertion associated with chest discomfort, associated sometimes with dizziness especially when he gets up from sitting position. He denies any lower extremity edema, no orthopnea or PND. No chronic cough or sputum production, no wheezing.     He still works in construction  He usually spend 6 months of the winter in [place]  He has a history of calcified granuloma in his chest.    Past Medical History:   Diagnosis Date    Advance directive discussed with patient     DNR - Has Living will    Allergic rhinitis     Arrhythmia     s/p pacemaker for heart block: Dr. Marie Danielle 7/31/17    Benign essential tremor 2/27/2017    Brachial neuritis     Chronic back pain     Erectile dysfunction 2/13/2012    History of tobacco abuse 10/1/2019    > 50 pack year    Hypertension 2/13/2012    [facility] disease [facility]     Dr. Shea Sebastian    Symptomatic advanced heart block        Past Surgical History:   Procedure Laterality Date    CERVICAL LAMINECTOMY  1995    ACDF    LUMBAR LAMINECTOMY  1964    with Boswell ankur       family history includes Other in his father; Stroke in his mother. SOCIAL HISTORY:   reports that he quit smoking about 15 years ago. He smoked 0.00 packs per day for 40.00 years. He has quit using smokeless tobacco.      ALLERGIES:  Patient has No Known Allergies. REVIEW OF SYSTEMS:  Constitutional: Negative for fever, no wt loss, no night sweats   HENT: Negative for sore throat, difficulty swallowing,   Eyes: Negative for redness, no discharge   Respiratory: sob with exertion   Cardiovascular: Negative for chest pain, no palpitations   Gastrointestinal: Negative for vomiting, diarrhea   Genitourinary: Negative for hematuria, no dysuria    Musculoskeletal: Negative for arthralgias, no joint swelling   Skin: Negative for rash  LE: no edema   Neurological: Negative for syncope, no tremor, no focal weakness or dysarthria   Hematological: Negative for adenopathy, or bleeding   Psychiatric/Behavorial: Negative for anxiety,    Objective:   PHYSICAL EXAM:  Blood pressure 85/60, pulse 64, temperature 97.8 °F (36.6 °C), resp. rate 21, height 5' 10\" (1.778 m), weight 171 lb (77.6 kg), SpO2 98 %.'  Gen: No acute distress  Eyes: PERRL. No sclera icterus. No conjunctival injection. ENT: No discharge. Pharynx clear. External appearance of ears and nose normal.  Neck: Trachea midline. No obvious mass. Resp: No accessory muscle use. No crackles. No wheezes. No rhonchi. CV: Regular rate. Regular rhythm. No murmur or rub. No edema. GI: Non-tender. Non-distended. No hernia. Skin: Warm, dry, normal texture and turgor. No nodule on exposed extremities. Lymph: No cervical LAD. M/S: No cyanosis. No clubbing. No joint deformity.     LE:  no edema   Neuro: no tremor, no focal deficit, awake and alert   Psych: intact judgement and insight. Current Outpatient Medications   Medication Sig Dispense Refill    tamsulosin (FLOMAX) 0.4 MG capsule Take 0.4 mg by mouth daily      atorvastatin (LIPITOR) 40 MG tablet Take 1 tablet by mouth nightly 30 tablet 5    apixaban (ELIQUIS) 5 MG TABS tablet Take 1 tablet by mouth 2 times daily 60 tablet 11    propranolol (INDERAL LA) 60 MG extended release capsule Take 1 capsule by mouth daily 30 capsule 5    carbidopa-levodopa (SINEMET CR)  MG per extended release tablet Take 1 tablet by mouth 3 times daily      isosorbide mononitrate (IMDUR) 30 MG extended release tablet Take 1 tablet by mouth daily (Patient not taking: Reported on 3/28/2022) 30 tablet 0     No current facility-administered medications for this visit. Data Reviewed:   CBC and Renal reviewed  Last CBC  Lab Results   Component Value Date    WBC 6.1 10/01/2019    RBC 4.40 10/01/2019    HGB 13.9 10/01/2019    MCV 95.5 10/01/2019     10/01/2019     Last Renal  Lab Results   Component Value Date     10/01/2019    K 4.3 10/01/2019     10/01/2019    CO2 27 10/01/2019    CO2 21 05/28/2019    CO2 24 10/08/2018    BUN 18 10/01/2019    CREATININE 1.6 04/04/2022    CREATININE 1.2 10/01/2019    GLUCOSE 83 10/01/2019    CALCIUM 9.5 10/01/2019       Last ABG  POC Blood Gas: No results found for: POCPH, POCPCO2, POCPO2, POCHCO3, NBEA, OGCZ2AQI  No results for input(s): PH, PCO2, PO2, HCO3, BE, O2SAT in the last 72 hours. Radiology Review:  Pertinent images / reports were reviewed as a part of this visit. CT Chest w/ contrast: Results for orders placed during the hospital encounter of 06/18/14    CT Chest W Contrast    Narrative  CT CHEST W CONTRAST    History: Lymphadenopathy    Comparison: none    The heart size is normal. There is left base round atelectasis.   Benign calcified granulomas present of the right lower lobe.    Positional congestion both lung bases typically resolves with  change in patient position. There is a presumed cyst dome of the  liver too small to reliably categorize by CT, image #52. The adrenal glands are normal.    Impression: Round atelectasis suspected left lung base posterior  basilar segment. Continued surveillance is recommended until  complete resolution of findings. No associated parapneumonic  effusion. 1 cm nodule or cyst left lobe of the thyroid gland, best evaluated  with ultrasound and clinical palpation. CT Chest w/o contrast: No results found for this or any previous visit. CTPA: No results found for this or any previous visit. CXR PA/LAT: Results for orders placed during the hospital encounter of 11/29/12    X-ray chest PA and lateral    Narrative  FINDINGS- The cardiomediastinal silhouette and pulmonary  vascularity are normal. The lungs are clear without infiltrates,  nodules or pleural effusions. A benign calcified granuloma is noted at the right lung base  posteriorly. IMPRESSION- NO ACUTE CARDIOPULMONARY ABNORMALITY      Dictated on- 11/29/2012 14-23-24    Electronically Read By- Ravi Mcdaniel M.D. Electronically Released By- Ravi Mcdaniel M.D. Released Date Time- 11/29/12 1426    Stevie Roy M.D.  ------------------------------------------------------------------------------        Pulmonary function testing  None on file. This note was transcribed using [id number]. Please disregard any translational errors.     Rocio Tong MD  [facility] Pulmonary, Sleep and Critical Care

## 2022-04-08 ENCOUNTER — TELEPHONE (OUTPATIENT)
Dept: CARDIOLOGY CLINIC | Age: 77
End: 2022-04-08

## 2022-04-10 ENCOUNTER — APPOINTMENT (OUTPATIENT)
Dept: GENERAL RADIOLOGY | Age: 77
DRG: 390 | End: 2022-04-10
Payer: MEDICARE

## 2022-04-10 ENCOUNTER — APPOINTMENT (OUTPATIENT)
Dept: CT IMAGING | Age: 77
DRG: 390 | End: 2022-04-10
Payer: MEDICARE

## 2022-04-10 ENCOUNTER — HOSPITAL ENCOUNTER (INPATIENT)
Age: 77
LOS: 2 days | Discharge: HOME | DRG: 390 | End: 2022-04-12
Attending: EMERGENCY MEDICINE | Admitting: INTERNAL MEDICINE
Payer: MEDICARE

## 2022-04-10 DIAGNOSIS — I95.1 ORTHOSTATIC HYPOTENSION: Primary | ICD-10-CM

## 2022-04-10 DIAGNOSIS — R93.89 ABNORMAL FINDING ON CT SCAN: ICD-10-CM

## 2022-04-10 DIAGNOSIS — K56.600 PARTIAL SMALL BOWEL OBSTRUCTION (HCC): ICD-10-CM

## 2022-04-10 PROBLEM — R16.0 LIVER MASS: Status: ACTIVE | Noted: 2022-04-10

## 2022-04-10 PROBLEM — N40.0 BPH (BENIGN PROSTATIC HYPERPLASIA): Status: ACTIVE | Noted: 2022-04-10

## 2022-04-10 PROBLEM — K56.609 SBO (SMALL BOWEL OBSTRUCTION) (HCC): Status: ACTIVE | Noted: 2022-04-10

## 2022-04-10 PROBLEM — I48.91 ATRIAL FIBRILLATION (HCC): Status: ACTIVE | Noted: 2022-04-10

## 2022-04-10 PROBLEM — K56.609 SMALL BOWEL OBSTRUCTION (HCC): Status: ACTIVE | Noted: 2022-04-10

## 2022-04-10 LAB
ALBUMIN SERPL-MCNC: 3.1 G/DL (ref 3.4–5)
ALP BLD-CCNC: 32 U/L (ref 40–129)
ALT SERPL-CCNC: <5 U/L (ref 10–40)
ANION GAP SERPL CALCULATED.3IONS-SCNC: 10 MMOL/L (ref 3–16)
AST SERPL-CCNC: 28 U/L (ref 15–37)
BASOPHILS ABSOLUTE: 0 K/UL (ref 0–0.2)
BASOPHILS RELATIVE PERCENT: 0.2 %
BILIRUB SERPL-MCNC: 0.7 MG/DL (ref 0–1)
BILIRUBIN DIRECT: <0.2 MG/DL (ref 0–0.3)
BILIRUBIN URINE: NEGATIVE
BILIRUBIN, INDIRECT: ABNORMAL MG/DL (ref 0–1)
BLOOD, URINE: NEGATIVE
BUN BLDV-MCNC: 35 MG/DL (ref 7–20)
CALCIUM SERPL-MCNC: 8 MG/DL (ref 8.3–10.6)
CHLORIDE BLD-SCNC: 100 MMOL/L (ref 99–110)
CLARITY: CLEAR
CO2: 22 MMOL/L (ref 21–32)
COLOR: ABNORMAL
COMMENT UA: NORMAL
CREAT SERPL-MCNC: 1.2 MG/DL (ref 0.8–1.3)
EOSINOPHILS ABSOLUTE: 0 K/UL (ref 0–0.6)
EOSINOPHILS RELATIVE PERCENT: 0.4 %
EPITHELIAL CELLS, UA: NORMAL /HPF (ref 0–5)
GFR AFRICAN AMERICAN: >60
GFR NON-AFRICAN AMERICAN: 59
GLUCOSE BLD-MCNC: 99 MG/DL (ref 70–99)
GLUCOSE URINE: NEGATIVE MG/DL
HCT VFR BLD CALC: 39 % (ref 40.5–52.5)
HEMOGLOBIN: 13 G/DL (ref 13.5–17.5)
KETONES, URINE: ABNORMAL MG/DL
LACTIC ACID: 1 MMOL/L (ref 0.4–2)
LEUKOCYTE ESTERASE, URINE: ABNORMAL
LIPASE: 47 U/L (ref 13–60)
LYMPHOCYTES ABSOLUTE: 0.6 K/UL (ref 1–5.1)
LYMPHOCYTES RELATIVE PERCENT: 11.8 %
MCH RBC QN AUTO: 31.5 PG (ref 26–34)
MCHC RBC AUTO-ENTMCNC: 33.3 G/DL (ref 31–36)
MCV RBC AUTO: 94.5 FL (ref 80–100)
MICROSCOPIC EXAMINATION: YES
MONOCYTES ABSOLUTE: 1 K/UL (ref 0–1.3)
MONOCYTES RELATIVE PERCENT: 19.9 %
NEUTROPHILS ABSOLUTE: 3.5 K/UL (ref 1.7–7.7)
NEUTROPHILS RELATIVE PERCENT: 67.7 %
NITRITE, URINE: NEGATIVE
PDW BLD-RTO: 13 % (ref 12.4–15.4)
PH UA: 6 (ref 5–8)
PLATELET # BLD: 216 K/UL (ref 135–450)
PMV BLD AUTO: 8.3 FL (ref 5–10.5)
POTASSIUM REFLEX MAGNESIUM: 3.8 MMOL/L (ref 3.5–5.1)
PROTEIN UA: NEGATIVE MG/DL
RBC # BLD: 4.12 M/UL (ref 4.2–5.9)
RBC UA: NORMAL /HPF (ref 0–4)
SODIUM BLD-SCNC: 132 MMOL/L (ref 136–145)
SPECIFIC GRAVITY UA: <=1.005 (ref 1–1.03)
TOTAL PROTEIN: 6.2 G/DL (ref 6.4–8.2)
TROPONIN: <0.01 NG/ML
URINE REFLEX TO CULTURE: ABNORMAL
URINE TYPE: ABNORMAL
UROBILINOGEN, URINE: 1 E.U./DL
WBC # BLD: 5.1 K/UL (ref 4–11)
WBC UA: NORMAL /HPF (ref 0–5)

## 2022-04-10 PROCEDURE — 83690 ASSAY OF LIPASE: CPT

## 2022-04-10 PROCEDURE — G0378 HOSPITAL OBSERVATION PER HR: HCPCS

## 2022-04-10 PROCEDURE — 99284 EMERGENCY DEPT VISIT MOD MDM: CPT

## 2022-04-10 PROCEDURE — 84484 ASSAY OF TROPONIN QUANT: CPT

## 2022-04-10 PROCEDURE — 6370000000 HC RX 637 (ALT 250 FOR IP): Performed by: INTERNAL MEDICINE

## 2022-04-10 PROCEDURE — 85025 COMPLETE CBC W/AUTO DIFF WBC: CPT

## 2022-04-10 PROCEDURE — 80048 BASIC METABOLIC PNL TOTAL CA: CPT

## 2022-04-10 PROCEDURE — 6360000004 HC RX CONTRAST MEDICATION: Performed by: EMERGENCY MEDICINE

## 2022-04-10 PROCEDURE — 2580000003 HC RX 258: Performed by: NURSE PRACTITIONER

## 2022-04-10 PROCEDURE — 80076 HEPATIC FUNCTION PANEL: CPT

## 2022-04-10 PROCEDURE — 71045 X-RAY EXAM CHEST 1 VIEW: CPT

## 2022-04-10 PROCEDURE — 2580000003 HC RX 258: Performed by: EMERGENCY MEDICINE

## 2022-04-10 PROCEDURE — 83605 ASSAY OF LACTIC ACID: CPT

## 2022-04-10 PROCEDURE — 6370000000 HC RX 637 (ALT 250 FOR IP): Performed by: NURSE PRACTITIONER

## 2022-04-10 PROCEDURE — 1200000000 HC SEMI PRIVATE

## 2022-04-10 PROCEDURE — 81001 URINALYSIS AUTO W/SCOPE: CPT

## 2022-04-10 PROCEDURE — 2500000003 HC RX 250 WO HCPCS: Performed by: INTERNAL MEDICINE

## 2022-04-10 PROCEDURE — 93005 ELECTROCARDIOGRAM TRACING: CPT | Performed by: NURSE PRACTITIONER

## 2022-04-10 PROCEDURE — 96360 HYDRATION IV INFUSION INIT: CPT

## 2022-04-10 PROCEDURE — 74177 CT ABD & PELVIS W/CONTRAST: CPT

## 2022-04-10 RX ORDER — 0.9 % SODIUM CHLORIDE 0.9 %
1000 INTRAVENOUS SOLUTION INTRAVENOUS ONCE
Status: COMPLETED | OUTPATIENT
Start: 2022-04-10 | End: 2022-04-10

## 2022-04-10 RX ORDER — ATORVASTATIN CALCIUM 40 MG/1
40 TABLET, FILM COATED ORAL NIGHTLY
Status: DISCONTINUED | OUTPATIENT
Start: 2022-04-10 | End: 2022-04-12 | Stop reason: HOSPADM

## 2022-04-10 RX ORDER — ONDANSETRON 2 MG/ML
4 INJECTION INTRAMUSCULAR; INTRAVENOUS EVERY 6 HOURS PRN
Status: DISCONTINUED | OUTPATIENT
Start: 2022-04-10 | End: 2022-04-12 | Stop reason: HOSPADM

## 2022-04-10 RX ORDER — ISOSORBIDE MONONITRATE 30 MG/1
30 TABLET, EXTENDED RELEASE ORAL DAILY
Status: DISCONTINUED | OUTPATIENT
Start: 2022-04-11 | End: 2022-04-12 | Stop reason: HOSPADM

## 2022-04-10 RX ORDER — SODIUM CHLORIDE 0.9 % (FLUSH) 0.9 %
5-40 SYRINGE (ML) INJECTION EVERY 12 HOURS SCHEDULED
Status: DISCONTINUED | OUTPATIENT
Start: 2022-04-10 | End: 2022-04-12 | Stop reason: HOSPADM

## 2022-04-10 RX ORDER — TAMSULOSIN HYDROCHLORIDE 0.4 MG/1
0.4 CAPSULE ORAL DAILY
Status: DISCONTINUED | OUTPATIENT
Start: 2022-04-11 | End: 2022-04-12 | Stop reason: HOSPADM

## 2022-04-10 RX ORDER — CARBIDOPA AND LEVODOPA 50; 200 MG/1; MG/1
1 TABLET, EXTENDED RELEASE ORAL 3 TIMES DAILY
Status: CANCELLED | OUTPATIENT
Start: 2022-04-10

## 2022-04-10 RX ORDER — DEXTROSE, SODIUM CHLORIDE, AND POTASSIUM CHLORIDE 5; .45; .15 G/100ML; G/100ML; G/100ML
INJECTION INTRAVENOUS CONTINUOUS
Status: DISCONTINUED | OUTPATIENT
Start: 2022-04-10 | End: 2022-04-11

## 2022-04-10 RX ORDER — SODIUM CHLORIDE 0.9 % (FLUSH) 0.9 %
5-40 SYRINGE (ML) INJECTION PRN
Status: DISCONTINUED | OUTPATIENT
Start: 2022-04-10 | End: 2022-04-12 | Stop reason: HOSPADM

## 2022-04-10 RX ORDER — CARBIDOPA AND LEVODOPA 50; 200 MG/1; MG/1
2 TABLET, EXTENDED RELEASE ORAL EVERY MORNING
Status: DISCONTINUED | OUTPATIENT
Start: 2022-04-11 | End: 2022-04-12 | Stop reason: HOSPADM

## 2022-04-10 RX ORDER — PROPRANOLOL HCL 60 MG
60 CAPSULE, EXTENDED RELEASE 24HR ORAL DAILY
Status: DISCONTINUED | OUTPATIENT
Start: 2022-04-11 | End: 2022-04-12 | Stop reason: HOSPADM

## 2022-04-10 RX ORDER — ONDANSETRON 4 MG/1
4 TABLET, ORALLY DISINTEGRATING ORAL EVERY 8 HOURS PRN
Status: DISCONTINUED | OUTPATIENT
Start: 2022-04-10 | End: 2022-04-12 | Stop reason: HOSPADM

## 2022-04-10 RX ORDER — CARBIDOPA AND LEVODOPA 50; 200 MG/1; MG/1
1 TABLET, EXTENDED RELEASE ORAL 3 TIMES DAILY
Status: DISCONTINUED | OUTPATIENT
Start: 2022-04-10 | End: 2022-04-12 | Stop reason: HOSPADM

## 2022-04-10 RX ORDER — ACETAMINOPHEN 325 MG/1
650 TABLET ORAL EVERY 6 HOURS PRN
Status: DISCONTINUED | OUTPATIENT
Start: 2022-04-10 | End: 2022-04-12 | Stop reason: HOSPADM

## 2022-04-10 RX ORDER — CARBIDOPA AND LEVODOPA 50; 200 MG/1; MG/1
1 TABLET, EXTENDED RELEASE ORAL 3 TIMES DAILY
Status: DISCONTINUED | OUTPATIENT
Start: 2022-04-10 | End: 2022-04-10 | Stop reason: SDUPTHER

## 2022-04-10 RX ORDER — LIDOCAINE 4 G/G
1 PATCH TOPICAL 2 TIMES DAILY PRN
Status: DISCONTINUED | OUTPATIENT
Start: 2022-04-10 | End: 2022-04-12 | Stop reason: HOSPADM

## 2022-04-10 RX ORDER — ACETAMINOPHEN 650 MG/1
650 SUPPOSITORY RECTAL EVERY 6 HOURS PRN
Status: DISCONTINUED | OUTPATIENT
Start: 2022-04-10 | End: 2022-04-12 | Stop reason: HOSPADM

## 2022-04-10 RX ORDER — SODIUM CHLORIDE 9 MG/ML
1000 INJECTION, SOLUTION INTRAVENOUS CONTINUOUS
Status: DISCONTINUED | OUTPATIENT
Start: 2022-04-10 | End: 2022-04-10

## 2022-04-10 RX ORDER — SODIUM CHLORIDE 9 MG/ML
INJECTION, SOLUTION INTRAVENOUS PRN
Status: DISCONTINUED | OUTPATIENT
Start: 2022-04-10 | End: 2022-04-12 | Stop reason: HOSPADM

## 2022-04-10 RX ORDER — LANOLIN ALCOHOL/MO/W.PET/CERES
6 CREAM (GRAM) TOPICAL NIGHTLY PRN
Status: DISCONTINUED | OUTPATIENT
Start: 2022-04-10 | End: 2022-04-12 | Stop reason: HOSPADM

## 2022-04-10 RX ADMIN — SODIUM CHLORIDE 1000 ML: 9 INJECTION, SOLUTION INTRAVENOUS at 09:49

## 2022-04-10 RX ADMIN — SODIUM CHLORIDE 1000 ML: 9 INJECTION, SOLUTION INTRAVENOUS at 11:59

## 2022-04-10 RX ADMIN — IOPAMIDOL 75 ML: 755 INJECTION, SOLUTION INTRAVENOUS at 10:43

## 2022-04-10 RX ADMIN — ATORVASTATIN CALCIUM 40 MG: 40 TABLET, FILM COATED ORAL at 20:28

## 2022-04-10 RX ADMIN — POTASSIUM CHLORIDE, DEXTROSE MONOHYDRATE AND SODIUM CHLORIDE: 150; 5; 450 INJECTION, SOLUTION INTRAVENOUS at 16:50

## 2022-04-10 RX ADMIN — CARBIDOPA AND LEVODOPA 1 TABLET: 50; 200 TABLET, EXTENDED RELEASE ORAL at 16:21

## 2022-04-10 RX ADMIN — Medication 6 MG: at 21:07

## 2022-04-10 ASSESSMENT — ENCOUNTER SYMPTOMS
COUGH: 0
SORE THROAT: 0
ABDOMINAL PAIN: 1
BACK PAIN: 0
SHORTNESS OF BREATH: 0
EYE PAIN: 0
DIARRHEA: 0
ANAL BLEEDING: 0
NAUSEA: 1
VOMITING: 1

## 2022-04-10 ASSESSMENT — PAIN SCALES - GENERAL
PAINLEVEL_OUTOF10: 0
PAINLEVEL_OUTOF10: 0

## 2022-04-10 NOTE — PROGRESS NOTES
Pt very concerned that home medications are timed differently then the way he takes them at home. Made Pharmacist Martin Luo aware. Martin Luo states that he will retime medications. Will continue to monitor.

## 2022-04-10 NOTE — ED PROVIDER NOTES
[address]  [address] [address]  Dept: [phone]  Loc: [facility]        I have seen and evaluated this patient with my supervising physician, Dr. Ana M Strickland. CHIEF COMPLAINT    Chief Complaint   Patient presents with    Abdominal Pain     started thursday, severe pain while eating dinner. then started vomiting that night. has not been able to eat without pain. feels dehydrated. dizzy when standing    Emesis       HPI    Shorty Terry is a 67 y.o. nontoxic, well-appearing male in no acute distress with a medical history including, but not limited to, Parkinson's disease, atrial fibrillation with pacemaker, symptomatic bradycardia, neuropathic pain in feet, and arrhythmia who presents with abdominal pain localized in the epigastrium of the abdomen. Onset was Thursday. The duration has been intermittent since the onset. The pain is associated with nausea and vomiting x13 which occurred on Friday but none since, food intolerance, lightheadedness, presyncope, inability to ambulate, and bloating. There is no pain presently as the pain only occurs with meals. The quality of the pain is \"sharp\". The context is that the symptoms started spontaneously. There are no alleviating factors. In room to assist with standing the patient for a position of standing portion of orthostatic blood pressures. At this time the patient reports that he actually fell secondary to lightheadedness. He fell from standing onto carpeted floor. He denies head injury, loss of consciousness, or other injuries. REVIEW OF SYSTEMS    Review of Systems   Constitutional: Negative for chills, diaphoresis, fatigue and fever. HENT: Negative for congestion and sore throat. Eyes: Negative for pain and visual disturbance. Respiratory: Negative for cough and shortness of breath.     Cardiovascular: Negative for chest pain and leg swelling. Gastrointestinal: Positive for abdominal pain, nausea and vomiting (Resolved). Negative for anal bleeding and diarrhea. Genitourinary: Negative for difficulty urinating, dysuria, frequency and urgency. Musculoskeletal: Positive for gait problem. Negative for back pain and neck pain. Skin: Negative for rash and wound. Neurological: Positive for light-headedness. Negative for dizziness. Hematological: Negative. Psychiatric/Behavioral: Negative.             PAST MEDICAL & SURGICAL HISTORY    Past Medical History:   Diagnosis Date    Advance directive discussed with patient     DNR - Has Living will    Allergic rhinitis     Arrhythmia     s/p pacemaker for heart block: Dr. Dax Andrea 7/31/17    Benign essential tremor 2/27/2017    Brachial neuritis     Chronic back pain     Erectile dysfunction 2/13/2012    History of tobacco abuse 10/1/2019    > 50 pack year    Hypertension 2/13/2012    Parkinson disease ([facility] [address]     Dr. Vijay Hartman    Symptomatic advanced heart block      Past Surgical History:   Procedure Laterality Date   [address]    ACDF    LUMBAR LAMINECTOMY  1964    with Boswell ankur       CURRENT MEDICATIONS  (may include discharge medications prescribed in the ED)  Current Outpatient Rx   Medication Sig Dispense Refill    tamsulosin (FLOMAX) 0.4 MG capsule Take 0.4 mg by mouth daily      atorvastatin (LIPITOR) 40 MG tablet Take 1 tablet by mouth nightly 30 tablet 5    apixaban (ELIQUIS) 5 MG TABS tablet Take 1 tablet by mouth 2 times daily 60 tablet 11    propranolol (INDERAL LA) 60 MG extended release capsule Take 1 capsule by mouth daily 30 capsule 5    isosorbide mononitrate (IMDUR) 30 MG extended release tablet Take 1 tablet by mouth daily (Patient not taking: Reported on 3/28/2022) 30 tablet 0    carbidopa-levodopa (SINEMET CR)  MG per extended release tablet Take 1 tablet by mouth 3 times daily         ALLERGIES    No Known Allergies    SOCIAL AND FAMILY HISTORY    Social History     Socioeconomic History    Marital status: Single     Spouse name: Sterling Mark Number of children: None    Years of education: None    Highest education level: None   Occupational History    Occupation: Owner [employer]   Tobacco Use    Smoking status: Former Smoker     Packs/day: 0.00     Years: 40.00     Pack years: 0.00     Quit date: 2/12/2007     Years since quitting: 15.1    Smokeless tobacco: Former User   Vaping Use    Vaping Use: Never used   Substance and Sexual Activity    Alcohol use: Yes     Alcohol/week: 15.0 standard drinks     Types: 15 Cans of beer per week     Comment: occassional    Drug use: Never    Sexual activity: None   Other Topics Concern    None   Social History Narrative    None     Social Determinants of Health     Financial Resource Strain:     Difficulty of Paying Living Expenses: Not on file   Food Insecurity:     Worried About Running Out of Food in the Last Year: Not on file    Saw of Food in the Last Year: Not on file   Transportation Needs:     Lack of Transportation (Medical): Not on file    Lack of Transportation (Non-Medical):  Not on file   Physical Activity:     Days of Exercise per Week: Not on file    Minutes of Exercise per Session: Not on file   Stress:     Feeling of Stress : Not on file   Social Connections:     Frequency of Communication with Friends and Family: Not on file    Frequency of Social Gatherings with Friends and Family: Not on file    Attends Yarsani Services: Not on file    Active Member of Clubs or Organizations: Not on file    Attends Club or Organization Meetings: Not on file    Marital Status: Not on file   Intimate Partner Violence:     Fear of Current or Ex-Partner: Not on file    Emotionally Abused: Not on file    Physically Abused: Not on file    Sexually Abused: Not on file   Housing Stability:     Unable to Pay for Housing in the Last Year: Not on file    Number of Places Lived in the Last Year: Not on file    Unstable Housing in the Last Year: Not on file     Family History   Problem Relation Age of Onset    Stroke Mother          80    Other Father          80 Viral Meningitis       PHYSICAL EXAM    VITAL SIGNS: /68   Pulse 75   Temp 97.4 °F (36.3 °C) (Oral)   Resp 14   Wt 169 lb 5 oz (76.8 kg)   SpO2 98%   BMI 24.29 kg/m²   Constitutional:  Well developed, well nourished, no acute distress  Eyes:  Sclera nonicteric, conjunctiva moist  HENT:  Atraumatic, nose normal  Neck: no JVD  Respiratory:  No retractions, no accessory muscle use, normal breath sounds   Cardiovascular:  regular rate, no murmurs, rubs, or gallops. GI: No abdominal tenderness to palpation, soft, no guarding, bowel sounds present, no audible bruits or palpable pulsatile masses. Back:  CVA tenderness  Musculoskeletal:  No edema, no acute deformities  Vascular: Radial and DP pulses 2+ and equal bilaterally  Integument: No rashes, skin dry  Neurologic:  Alert & oriented, no slurred speech  Psychiatric: Cooperative, pleasant affect     EKG   Please see the ED Physician note for EKG interpretation. RADIOLOGY/PROCEDURES    CT ABDOMEN PELVIS W IV CONTRAST Additional Contrast? None    Result Date: 4/10/2022  EXAMINATION: CT OF THE ABDOMEN AND PELVIS WITH CONTRAST 4/10/2022 10:33 am TECHNIQUE: CT of the abdomen and pelvis was performed with the administration of intravenous contrast. Multiplanar reformatted images are provided for review. Dose modulation, iterative reconstruction, and/or weight based adjustment of the mA/kV was utilized to reduce the radiation dose to as low as reasonably achievable.  COMPARISON: 2019 HISTORY: ORDERING SYSTEM PROVIDED HISTORY: abd pain TECHNOLOGIST PROVIDED HISTORY: Reason for exam:->abd pain Additional Contrast?->None Decision Support Exception - unselect if not a suspected or confirmed emergency medical condition->Emergency Medical Condition (MA) Reason for Exam: gen abd pain, epigastric pain FINDINGS: Lower Chest: No acute infiltrate at the lung bases. Linear atelectasis in both lower lobes. Pacer leads are present within the right heart. Organs: Indeterminate 2.3 x 1.4 cm right hepatic lesion with attenuation values of 88. There are several cysts within the left lobe of the liver which are grossly stable. No acute biliary findings. The spleen, pancreas and adrenal glands are unremarkable. No renal mass or significant hydronephrosis. GI/Bowel: The stomach and duodenal sweep are unremarkable. Multiple dilated small bowel loops with gradual transition in the mid small bowel in the left hemiabdomen. Bowel loops measure up to 3.5 cm. No significant perienteric inflammatory changes. The distal small bowel is unremarkable. No evidence of appendicitis. Gas and stool is present throughout the colon with no pericolonic inflammatory changes. Colonic diverticulosis in the sigmoid region. Pelvis: No pelvic mass or free pelvic fluid. Mild prostatomegaly. Mild distention of the urinary bladder. Peritoneum/Retroperitoneum: The abdominal aorta is normal in caliber with calcified atherosclerotic plaque. No pathologic retroperitoneal adenopathy or upper abdominal ascites. Bones/Soft Tissues: No acute osseous or soft tissue abnormality. Posterior lumbar fusion at L4-5.     1. Findings consistent with an early or partial small bowel obstruction. Gradual transition in the mid small bowel in the left hemiabdomen. 2. Indeterminate right hepatic lesion measuring 2.3 cm in size. Recommend follow-up evaluation with hepatic MRI with contrast. 3. Mild prostatomegaly.      XR CHEST PORTABLE    Result Date: 4/10/2022  EXAMINATION: ONE XRAY VIEW OF THE CHEST 4/10/2022 9:45 am COMPARISON: 04/04/2022 HISTORY: ORDERING SYSTEM PROVIDED HISTORY: cards w/u TECHNOLOGIST PROVIDED HISTORY: Reason for exam:->cards w/u Reason for Exam: Dizzy and abd pain FINDINGS: Left subclavian cardiac pacemaker with leads over the right atrium and ventricle is noted, projecting in stable configuration. The cardiac silhouette, mediastinal hilar contours are normal.  No consolidation, pleural effusion or pneumothorax is identified. No acute cardiopulmonary disease. ED COURSE & MEDICAL DECISION MAKING    Pertinent Labs & Imaging studies reviewed and interpreted. (See chart for details)     See chart for details of medications given during the ED stay. Vitals:    04/10/22 0923 04/10/22 0930   BP:  109/68   Pulse: 93 75   Resp: 16 14   Temp: 97.4 °F (36.3 °C)    TempSrc: Oral    SpO2:  98%   Weight: 169 lb 5 oz (76.8 kg)        Differential diagnosis: Abdominal Aortic Aneurysm, Acute Coronary Syndrome, Ischemic Bowel, PUD, GERD, Acute Cholecystitis, Pancreatitis, Hepatitis, Colitis, SMA Syndrome, Mesenteric Steal Syndrome, Splanchnic Vein Thrombosis, other    Patient presents with inability to ambulate. He endorses epigastric abdominal discomfort with meals. We will obtain a Basic, abdominal, and cardiac work-up. Imaging will be obtained via CXR and CT abdomen pelvis. Work-up pending: Patient given a liter bolus of normal saline.     I have reviewed and interpreted all of the currently available lab results from this visit:  Results for orders placed or performed during the hospital encounter of 04/10/22   CBC with Auto Differential   Result Value Ref Range    WBC 5.1 4.0 - 11.0 K/uL    RBC 4.12 (L) 4.20 - 5.90 M/uL    Hemoglobin 13.0 (L) 13.5 - 17.5 g/dL    Hematocrit 39.0 (L) 40.5 - 52.5 %    MCV 94.5 80.0 - 100.0 fL    MCH 31.5 26.0 - 34.0 pg    MCHC 33.3 31.0 - 36.0 g/dL    RDW 13.0 12.4 - 15.4 %    Platelets 578 733 - 162 K/uL    MPV 8.3 5.0 - 10.5 fL    Neutrophils % 67.7 %    Lymphocytes % 11.8 %    Monocytes % 19.9 %    Eosinophils % 0.4 %    Basophils % 0.2 %    Neutrophils Absolute 3.5 1.7 - 7.7 K/uL    Lymphocytes Absolute 0.6 (L) 1.0 - 5.1 K/uL Monocytes Absolute 1.0 0.0 - 1.3 K/uL    Eosinophils Absolute 0.0 0.0 - 0.6 K/uL    Basophils Absolute 0.0 0.0 - 0.2 K/uL   Basic Metabolic Panel w/ Reflex to MG   Result Value Ref Range    Sodium 132 (L) 136 - 145 mmol/L    Potassium reflex Magnesium 3.8 3.5 - 5.1 mmol/L    Chloride 100 99 - 110 mmol/L    CO2 22 21 - 32 mmol/L    Anion Gap 10 3 - 16    Glucose 99 70 - 99 mg/dL    BUN 35 (H) 7 - 20 mg/dL    CREATININE 1.2 0.8 - 1.3 mg/dL    GFR Non- 59 (A) >60    GFR African American >60 >60    Calcium 8.0 (L) 8.3 - 10.6 mg/dL   Lipase   Result Value Ref Range    Lipase 47.0 13.0 - 60.0 U/L   Hepatic Function Panel   Result Value Ref Range    Total Protein 6.2 (L) 6.4 - 8.2 g/dL    Albumin 3.1 (L) 3.4 - 5.0 g/dL    Alkaline Phosphatase 32 (L) 40 - 129 U/L    ALT <5 (L) 10 - 40 U/L    AST 28 15 - 37 U/L    Total Bilirubin 0.7 0.0 - 1.0 mg/dL    Bilirubin, Direct <0.2 0.0 - 0.3 mg/dL    Bilirubin, Indirect see below 0.0 - 1.0 mg/dL   Troponin   Result Value Ref Range    Troponin <0.01 <0.01 ng/mL   Lactic Acid   Result Value Ref Range    Lactic Acid 1.0 0.4 - 2.0 mmol/L   Urinalysis with Reflex to Culture    Specimen: Urine   Result Value Ref Range    Color, UA Straw Straw/Yellow    Clarity, UA Clear Clear    Glucose, Ur Negative Negative mg/dL    Bilirubin Urine Negative Negative    Ketones, Urine TRACE (A) Negative mg/dL    Specific Gravity, UA <=1.005 1.005 - 1.030    Blood, Urine Negative Negative    pH, UA 6.0 5.0 - 8.0    Protein, UA Negative Negative mg/dL    Urobilinogen, Urine 1.0 <2.0 E.U./dL    Nitrite, Urine Negative Negative    Leukocyte Esterase, Urine TRACE (A) Negative    Microscopic Examination YES     Urine Type NotGiven     Urine Reflex to Culture Not Indicated    Microscopic Urinalysis   Result Value Ref Range    WBC, UA 0-2 0 - 5 /HPF    RBC, UA 0-2 0 - 4 /HPF    Epithelial Cells, UA 0-1 0 - 5 /HPF    Urinalysis Comments see below      Abnormal findings:  Chemistry panel: Sodium 132, BUN elevated 35, and GFR reduced at 59 and calcium reduced at 8  Reflex to culture shows trace ketones and leukocyte esterase but negative for UTI    Hepatic function panel without elevation    CBC: Identifies mild anemia with RBC 4.12, hemoglobin 13.0, hematocrit 39.0, lymphocytes absolute 0.6 but otherwise unremarkable    CT findings as above. Dr. Gurdeep Carr consulted GI-Dr. Husam Gibbons. See his note for further details. Patient is afebrile and nontoxic in appearance. He is not tolerating p.o. and has an early or partial small bowel obstruction. Therefore, the patient will be admitted to the hospital for further evaluation treatment. Patient is agreeable with plan for admission. FINAL IMPRESSION    Partial small bowel obstruction  Orthostatic hypotension      Disposition:  Admitted      Dr. Gurdeep Carr consulted with hospitalist patient was accepted for admission. Patient will be admitted to hospital for further evaluation and treatment. Hospitalist: Dr. Waylan Goltz      Discussed patients HPI, ED work-up, results, treatment, and response with my attending physician Dr. Gurdeep Carr.   Dr. Gale Heading the hospitalist -Dr. Evangelist Sutton who agrees to admit the patient to the hospital.        (Please note that this note was completed with a voice recognition program.  Every attempt was made to edit the dictations, but inevitably there remain words that are mis-transcribed.)       MAL Person CNP  04/10/22 2019       MAL Person CNP  04/10/22 2021

## 2022-04-10 NOTE — PROGRESS NOTES
General Surgery    Asked to see for possible SBO    Discussed with Dr. Ashley Antonio and CT reviewed    No acute surgical needs    Suspect this is more an enteritis    Agree with admission for IV hydation    NG only if symptoms worsen    X ray in AM to assess    Electronically signed by Markus Salinas MD on 4/10/2022 at 12:48 PM

## 2022-04-10 NOTE — H&P
Hospital Medicine History & Physical      PCP: Yunior Arthur MD    Date of Admission: 4/10/2022    Date of Service: Pt seen/examined on 04/10/22   and Admitted to Inpatient with expected LOS greater than two midnights due to medical therapy. Chief Complaint:    Chief Complaint   Patient presents with    Abdominal Pain     started thursday, severe pain while eating dinner. then started vomiting that night. has not been able to eat without pain. feels dehydrated. dizzy when standing    Emesis       History Of Present Illness: 67-year-old male with past medical history significant for atrial fibrillation on chronic anticoagulation with Eliquis, BPH presents to the emergency department with 2-day history of abdominal pain. Patient reports onset of abdominal pain today before presenting to the emergency department, diffuse, intermittent 10 out of 10, associated with nausea vomiting, unable to keep anything down. CT of the abdomen with what appears to be small bowel obstruction and hepatic lesion noted    Past Medical History:          Diagnosis Date    Advance directive discussed with patient     DNR - Has Living will    Allergic rhinitis     Arrhythmia     s/p pacemaker for heart block: Dr. Yang Beverage 7/31/17    Atrial fibrillation ([facility] [address] 4/10/2022    Benign essential tremor 2/27/2017    Brachial neuritis     Chronic back pain     Erectile dysfunction 2/13/2012    History of tobacco abuse 10/1/2019    > 50 pack year    Hypertension 2/13/2012    Parkinson disease ([facility] [address]     Dr. Linda Padgett Symptomatic advanced heart block        Past Surgical History:          Procedure Laterality Date   [address]    ACDF    LUMBAR LAMINECTOMY  1964    with Boswell ankur       Medications Prior to Admission:      Prior to Admission medications    Medication Sig Start Date End Date Taking?  Authorizing Provider   tamsulosin (FLOMAX) 0.4 MG capsule Take 0.4 mg by mouth daily    Historical Provider, MD   atorvastatin (LIPITOR) 40 MG tablet Take 1 tablet by mouth nightly 3/28/22   Rebecca Ren MD   apixaban Priscille Cones) 5 MG TABS tablet Take 1 tablet by mouth 2 times daily 3/28/22   Daron Yang MD   propranolol (INDERAL LA) 60 MG extended release capsule Take 1 capsule by mouth daily 22   Daron Yang MD   isosorbide mononitrate (IMDUR) 30 MG extended release tablet Take 1 tablet by mouth daily  Patient not taking: Reported on 3/28/2022 1/13/22   Daron Yang MD   carbidopa-levodopa (SINEMET CR)  MG per extended release tablet Take 1 tablet by mouth 3 times daily    Historical Provider, MD       Allergies:  Patient has no known allergies. Social History:      The patient currently lives residential    TOBACCO:   reports that he quit smoking about 15 years ago. He smoked 0.00 packs per day for 40.00 years. He has quit using smokeless tobacco.  ETOH:   reports current alcohol use of about 15.0 standard drinks of alcohol per week. E-Cigarettes/Vaping Use     Questions Responses    E-Cigarette/Vaping Use Never User    Start Date     Passive Exposure No    Quit Date     Counseling Given Yes    Comments             Family History:       Reviewed in detail and negative for DM, CAD, Cancer, CVA. Positive as follows:        Problem Relation Age of Onset    Stroke Mother          80    Other Father          80 Viral Meningitis       REVIEW OF SYSTEMS COMPLETED:   Pertinent positives as noted in the HPI. All other systems reviewed and negative. PHYSICAL EXAM PERFORMED:    /63   Pulse 72   Temp 97.4 °F (36.3 °C) (Oral)   Resp 21   Wt 169 lb 5 oz (76.8 kg)   SpO2 97%   BMI 24.29 kg/m²     General appearance:  No apparent distress, appears stated age and cooperative. HEENT:  Normal cephalic, atraumatic without obvious deformity. Pupils equal, round, and reactive to light. Extra ocular muscles intact. Conjunctivae/corneas clear. Neck: Supple, with full range of motion. No jugular venous distention. Trachea midline. Respiratory:  Normal respiratory effort. Clear to auscultation, bilaterally without Rales/Wheezes/Rhonchi. Cardiovascular:  Regular rate and rhythm with normal S1/S2 without murmurs, rubs or gallops. Abdomen: Soft, non-tender, non-distended with hypoactive bowel sounds. Musculoskeletal:  No clubbing, cyanosis or edema bilaterally. Full range of motion without deformity. Skin: Skin color, texture, turgor normal.  No rashes or lesions. Neurologic:  Neurovascularly intact without any focal sensory/motor deficits. Cranial nerves: II-XII intact, grossly non-focal.  Psychiatric:  Alert and oriented, thought content appropriate, normal insight  Capillary Refill: Brisk,3 seconds, normal  Peripheral Pulses: +2 palpable, equal bilaterally       Labs:     Recent Labs     04/10/22  0947   WBC 5.1   HGB 13.0*   HCT 39.0*        Recent Labs     04/10/22  0947   *   K 3.8      CO2 22   BUN 35*   CREATININE 1.2   CALCIUM 8.0*     Recent Labs     04/10/22  0947   AST 28   ALT <5*   BILIDIR <0.2   BILITOT 0.7   ALKPHOS 32*     No results for input(s): INR in the last 72 hours. Recent Labs     04/10/22  0947   TROPONINI <0.01       Urinalysis:      Lab Results   Component Value Date    NITRU Negative 04/10/2022    WBCUA 0-2 04/10/2022    RBCUA 0-2 04/10/2022    BLOODU Negative 04/10/2022    SPECGRAV <=1.005 04/10/2022    GLUCOSEU Negative 04/10/2022       Radiology:     CXR: I have reviewed the CXR with the following interpretation: No acute findings  EKG:  I have reviewed the EKG with the following interpretation: Paced rhythm    CT ABDOMEN PELVIS W IV CONTRAST Additional Contrast? None   Preliminary Result   1. Findings consistent with an early or partial small bowel obstruction. Gradual transition in the mid small bowel in the left hemiabdomen. 2. Indeterminate right hepatic lesion measuring 2.3 cm in size.   Recommend   follow-up evaluation with hepatic MRI with contrast.   3. Mild prostatomegaly. XR CHEST PORTABLE   Final Result   No acute cardiopulmonary disease. ASSESSMENT:    Active Hospital Problems    Diagnosis Date Noted    SBO (small bowel obstruction) ([address] [K56.609] 04/10/2022    Liver mass [R16.0] 04/10/2022    Small bowel obstruction ([address] [K56.609] 04/10/2022    Atrial fibrillation ([address] [I48.91] 04/10/2022         PLAN:    Small bowel obstruction  Continue n.p.o.  IV fluids  Pain medication as needed    Orthostatic hypotension  Most likely related to volume depletion  Fluid resuscitation    Liver mass  2.3 cm liver mass noted, outpatient follow-up recommended    Atrial fibrillation  S/p pacemaker  Currently rate controlled, resume home medication, hold Eliquis in case surgery is needed  Lovenox        DVT Prophylaxis: Lovenox  Diet: No diet orders on file  Code Status: Prior    PT/OT Eval Status: No need    Dispo -return home       Ressie MD Rafia    Thank you Bobbi Montgomery MD for the opportunity to be involved in this patient's care. If you have any questions or concerns please feel free to contact me at [phone].

## 2022-04-10 NOTE — PROGRESS NOTES
Pt has large bag of prescription medication at bedside. Placed home medications in [facility]. Made Pharmacy aware. Will continue to monitor.

## 2022-04-10 NOTE — ED PROVIDER NOTES
[facility]  eMERGENCY dEPARTMENT eNCOUnter   Physician Attestation    Pt Name: Michelle Imam Day  MRN: [id number]  Land[place] 1945  Date of evaluation: 4/10/22        Physician Note:    This patient was seen by the advanced practice provider. I have personally performed a face to face diagnostic evaluation on this patient and performed a substantive portion of the visit including all aspects of the medical decision making. History: Briefly, 67 y.o. male presents with Epigastric pain. Started on Thursday with meals. Vomited 13 times on Friday. Also having epigastric abdominal pain and bloating. This morning he did not pass out but he slowly slumped to the ground. It does turn out that he is quite orthostatic. His blood pressure dropped to 60 systolic Mr. Normal.  He does have a history of atrial fibrillation. He is on Eliquis. He did not strike his head when he fell to the ground. He has no headache and no neurologic symptoms. Physical Exam  Constitutional:       Appearance: He is well-developed. He is not diaphoretic. HENT:      Head: Normocephalic and atraumatic. Right Ear: External ear normal.      Left Ear: External ear normal.   Eyes:      General: No scleral icterus. Right eye: No discharge. Left eye: No discharge. Neck:      Thyroid: No thyromegaly. Vascular: No JVD. Trachea: No tracheal deviation. Cardiovascular:      Rate and Rhythm: Normal rate and regular rhythm. Heart sounds: No murmur heard. No friction rub. No gallop. Pulmonary:      Effort: Pulmonary effort is normal. No respiratory distress. Breath sounds: Normal breath sounds. No stridor. No wheezing or rales. Abdominal:      General: There is no distension. Palpations: Abdomen is soft. Tenderness: There is no abdominal tenderness. There is no guarding or rebound. Musculoskeletal:         General: No tenderness.       Cervical back: Normal range of motion. Skin:     General: Skin is warm and dry. Findings: No rash (On exposed body surfaces). Neurological:      Mental Status: He is alert and oriented to person, place, and time. Coordination: Coordination normal.   Psychiatric:         Behavior: Behavior normal.         Thought Content: Thought content normal.         MDM:     EKG visualized preliminarily interpreted by myself. This is a ventricular paced rhythm with a wide complex. Rate 72. Axis is interpreted as -50. The widened QRS is a nonspecific IVCD. No signs of acute ST elevation or acute infarct.     Results for orders placed or performed during the hospital encounter of 04/10/22   CBC with Auto Differential   Result Value Ref Range    WBC 5.1 4.0 - 11.0 K/uL    RBC 4.12 (L) 4.20 - 5.90 M/uL    Hemoglobin 13.0 (L) 13.5 - 17.5 g/dL    Hematocrit 39.0 (L) 40.5 - 52.5 %    MCV 94.5 80.0 - 100.0 fL    MCH 31.5 26.0 - 34.0 pg    MCHC 33.3 31.0 - 36.0 g/dL    RDW 13.0 12.4 - 15.4 %    Platelets 410 473 - 924 K/uL    MPV 8.3 5.0 - 10.5 fL    Neutrophils % 67.7 %    Lymphocytes % 11.8 %    Monocytes % 19.9 %    Eosinophils % 0.4 %    Basophils % 0.2 %    Neutrophils Absolute 3.5 1.7 - 7.7 K/uL    Lymphocytes Absolute 0.6 (L) 1.0 - 5.1 K/uL    Monocytes Absolute 1.0 0.0 - 1.3 K/uL    Eosinophils Absolute 0.0 0.0 - 0.6 K/uL    Basophils Absolute 0.0 0.0 - 0.2 K/uL   Basic Metabolic Panel w/ Reflex to MG   Result Value Ref Range    Sodium 132 (L) 136 - 145 mmol/L    Potassium reflex Magnesium 3.8 3.5 - 5.1 mmol/L    Chloride 100 99 - 110 mmol/L    CO2 22 21 - 32 mmol/L    Anion Gap 10 3 - 16    Glucose 99 70 - 99 mg/dL    BUN 35 (H) 7 - 20 mg/dL    CREATININE 1.2 0.8 - 1.3 mg/dL    GFR Non- 59 (A) >60    GFR African American >60 >60    Calcium 8.0 (L) 8.3 - 10.6 mg/dL   Lipase   Result Value Ref Range    Lipase 47.0 13.0 - 60.0 U/L   Hepatic Function Panel   Result Value Ref Range    Total Protein 6.2 (L) 6.4 - 8.2 g/dL    Albumin 3.1 (L) 3.4 - 5.0 g/dL    Alkaline Phosphatase 32 (L) 40 - 129 U/L    ALT <5 (L) 10 - 40 U/L    AST 28 15 - 37 U/L    Total Bilirubin 0.7 0.0 - 1.0 mg/dL    Bilirubin, Direct <0.2 0.0 - 0.3 mg/dL    Bilirubin, Indirect see below 0.0 - 1.0 mg/dL   Troponin   Result Value Ref Range    Troponin <0.01 <0.01 ng/mL   Lactic Acid   Result Value Ref Range    Lactic Acid 1.0 0.4 - 2.0 mmol/L   Urinalysis with Reflex to Culture    Specimen: Urine   Result Value Ref Range    Color, UA Straw Straw/Yellow    Clarity, UA Clear Clear    Glucose, Ur Negative Negative mg/dL    Bilirubin Urine Negative Negative    Ketones, Urine TRACE (A) Negative mg/dL    Specific Gravity, UA <=1.005 1.005 - 1.030    Blood, Urine Negative Negative    pH, UA 6.0 5.0 - 8.0    Protein, UA Negative Negative mg/dL    Urobilinogen, Urine 1.0 <2.0 E.U./dL    Nitrite, Urine Negative Negative    Leukocyte Esterase, Urine TRACE (A) Negative    Microscopic Examination YES     Urine Type NotGiven     Urine Reflex to Culture Not Indicated    Microscopic Urinalysis   Result Value Ref Range    WBC, UA 0-2 0 - 5 /HPF    RBC, UA 0-2 0 - 4 /HPF    Epithelial Cells, UA 0-1 0 - 5 /HPF    Urinalysis Comments see below      CT ABDOMEN PELVIS W IV CONTRAST Additional Contrast? None    Result Date: 4/10/2022  EXAMINATION: CT OF THE ABDOMEN AND PELVIS WITH CONTRAST 4/10/2022 10:33 am TECHNIQUE: CT of the abdomen and pelvis was performed with the administration of intravenous contrast. Multiplanar reformatted images are provided for review. Dose modulation, iterative reconstruction, and/or weight based adjustment of the mA/kV was utilized to reduce the radiation dose to as low as reasonably achievable.  COMPARISON: 06/03/2019 HISTORY: ORDERING SYSTEM PROVIDED HISTORY: abd pain TECHNOLOGIST PROVIDED HISTORY: Reason for exam:->abd pain Additional Contrast?->None Decision Support Exception - unselect if not a suspected or confirmed emergency medical condition->Emergency Medical Condition (MA) Reason for Exam: gen abd pain, epigastric pain FINDINGS: Lower Chest: No acute infiltrate at the lung bases. Linear atelectasis in both lower lobes. Pacer leads are present within the right heart. Organs: Indeterminate 2.3 x 1.4 cm right hepatic lesion with attenuation values of 88. There are several cysts within the left lobe of the liver which are grossly stable. No acute biliary findings. The spleen, pancreas and adrenal glands are unremarkable. No renal mass or significant hydronephrosis. GI/Bowel: The stomach and duodenal sweep are unremarkable. Multiple dilated small bowel loops with gradual transition in the mid small bowel in the left hemiabdomen. Bowel loops measure up to 3.5 cm. No significant perienteric inflammatory changes. The distal small bowel is unremarkable. No evidence of appendicitis. Gas and stool is present throughout the colon with no pericolonic inflammatory changes. Colonic diverticulosis in the sigmoid region. Pelvis: No pelvic mass or free pelvic fluid. Mild prostatomegaly. Mild distention of the urinary bladder. Peritoneum/Retroperitoneum: The abdominal aorta is normal in caliber with calcified atherosclerotic plaque. No pathologic retroperitoneal adenopathy or upper abdominal ascites. Bones/Soft Tissues: No acute osseous or soft tissue abnormality. Posterior lumbar fusion at L4-5.     1. Findings consistent with an early or partial small bowel obstruction. Gradual transition in the mid small bowel in the left hemiabdomen. 2. Indeterminate right hepatic lesion measuring 2.3 cm in size. Recommend follow-up evaluation with hepatic MRI with contrast. 3. Mild prostatomegaly.      XR CHEST PORTABLE    Result Date: 4/10/2022  EXAMINATION: ONE XRAY VIEW OF THE CHEST 4/10/2022 9:45 am COMPARISON: 04/04/2022 HISTORY: ORDERING SYSTEM PROVIDED HISTORY: cards w/u TECHNOLOGIST PROVIDED HISTORY: Reason for exam:->cards w/u Reason for Exam: Dizzy and abd pain FINDINGS: Left subclavian cardiac pacemaker with leads over the right atrium and ventricle is noted, projecting in stable configuration. The cardiac silhouette, mediastinal hilar contours are normal.  No consolidation, pleural effusion or pneumothorax is identified. No acute cardiopulmonary disease. CRITICAL CARE  I personally saw the patient and independently provided 20 minutes of non-concurrent critical care out of the total shared critical care time provided. This excludes seperately billable procedures. Critical care time was provided for 20 that required close evaluation and/or intervention with concern for potential patient decompensation. 1. Orthostatic hypotension    2. Partial small bowel obstruction ([facility] [address]          DISPOSITION/PLAN admission  PATIENT REFERRED TO:  No follow-up provider specified. DISCHARGE MEDICATIONS:  New Prescriptions    No medications on file         Roxy Farris MD        This report has been produced using speech recognition software and may contain errors related to that system including errors in grammar, punctuation, and spelling, as well as words and phrases that may be inappropriate. If there are any questions or concerns please feel free to contact the dictating provider for clarification.        Roxy Farris MD  04/10/22 1557

## 2022-04-10 NOTE — PLAN OF CARE
Pt up to chair. Call light and bedside table within reach. Reminded pt to call for assistance. Will continue to monitor.

## 2022-04-10 NOTE — ED TRIAGE NOTES
Pt arrives via medic from home with c/o abdominal pain that started on Thursday while eating dinner. States shortly after he started vomiting. He states he vomited thoughout the night. Now he is unable to stand without feeling very dizzy. Unable to eat without pain. Skin pwd. A&Ox3.

## 2022-04-11 ENCOUNTER — APPOINTMENT (OUTPATIENT)
Dept: GENERAL RADIOLOGY | Age: 77
DRG: 390 | End: 2022-04-11
Payer: MEDICARE

## 2022-04-11 LAB
ANION GAP SERPL CALCULATED.3IONS-SCNC: 11 MMOL/L (ref 3–16)
BUN BLDV-MCNC: 21 MG/DL (ref 7–20)
CALCIUM SERPL-MCNC: 8.1 MG/DL (ref 8.3–10.6)
CHLORIDE BLD-SCNC: 106 MMOL/L (ref 99–110)
CO2: 21 MMOL/L (ref 21–32)
CREAT SERPL-MCNC: 1.1 MG/DL (ref 0.8–1.3)
EKG ATRIAL RATE: 72 BPM
EKG DIAGNOSIS: NORMAL
EKG P AXIS: 100 DEGREES
EKG P-R INTERVAL: 306 MS
EKG Q-T INTERVAL: 446 MS
EKG QRS DURATION: 152 MS
EKG QTC CALCULATION (BAZETT): 488 MS
EKG R AXIS: -48 DEGREES
EKG T AXIS: 76 DEGREES
EKG VENTRICULAR RATE: 72 BPM
GFR AFRICAN AMERICAN: >60
GFR NON-AFRICAN AMERICAN: >60
GLUCOSE BLD-MCNC: 95 MG/DL (ref 70–99)
LACTIC ACID: 0.8 MMOL/L (ref 0.4–2)
POTASSIUM REFLEX MAGNESIUM: 4 MMOL/L (ref 3.5–5.1)
SODIUM BLD-SCNC: 138 MMOL/L (ref 136–145)

## 2022-04-11 PROCEDURE — 2500000003 HC RX 250 WO HCPCS: Performed by: INTERNAL MEDICINE

## 2022-04-11 PROCEDURE — APPSS180 APP SPLIT SHARED TIME > 60 MINUTES: Performed by: PHYSICIAN ASSISTANT

## 2022-04-11 PROCEDURE — 6370000000 HC RX 637 (ALT 250 FOR IP): Performed by: INTERNAL MEDICINE

## 2022-04-11 PROCEDURE — G0378 HOSPITAL OBSERVATION PER HR: HCPCS

## 2022-04-11 PROCEDURE — 36415 COLL VENOUS BLD VENIPUNCTURE: CPT

## 2022-04-11 PROCEDURE — APPNB180 APP NON BILLABLE TIME > 60 MINS: Performed by: PHYSICIAN ASSISTANT

## 2022-04-11 PROCEDURE — 1200000000 HC SEMI PRIVATE

## 2022-04-11 PROCEDURE — 6370000000 HC RX 637 (ALT 250 FOR IP): Performed by: NURSE PRACTITIONER

## 2022-04-11 PROCEDURE — 83605 ASSAY OF LACTIC ACID: CPT

## 2022-04-11 PROCEDURE — 80048 BASIC METABOLIC PNL TOTAL CA: CPT

## 2022-04-11 PROCEDURE — 74019 RADEX ABDOMEN 2 VIEWS: CPT

## 2022-04-11 PROCEDURE — 99222 1ST HOSP IP/OBS MODERATE 55: CPT | Performed by: SURGERY

## 2022-04-11 PROCEDURE — 93010 ELECTROCARDIOGRAM REPORT: CPT | Performed by: INTERNAL MEDICINE

## 2022-04-11 RX ADMIN — CARBIDOPA AND LEVODOPA 1 TABLET: 25; 100 TABLET ORAL at 12:25

## 2022-04-11 RX ADMIN — TAMSULOSIN HYDROCHLORIDE 0.4 MG: 0.4 CAPSULE ORAL at 18:34

## 2022-04-11 RX ADMIN — ATORVASTATIN CALCIUM 40 MG: 40 TABLET, FILM COATED ORAL at 18:34

## 2022-04-11 RX ADMIN — CARBIDOPA AND LEVODOPA 1 TABLET: 50; 200 TABLET, EXTENDED RELEASE ORAL at 09:59

## 2022-04-11 RX ADMIN — CARBIDOPA AND LEVODOPA 2 TABLET: 50; 200 TABLET, EXTENDED RELEASE ORAL at 04:59

## 2022-04-11 RX ADMIN — CARBIDOPA AND LEVODOPA 1 TABLET: 50; 200 TABLET, EXTENDED RELEASE ORAL at 18:34

## 2022-04-11 RX ADMIN — CARBIDOPA AND LEVODOPA 1 TABLET: 25; 100 TABLET ORAL at 06:49

## 2022-04-11 RX ADMIN — POTASSIUM CHLORIDE, DEXTROSE MONOHYDRATE AND SODIUM CHLORIDE: 150; 5; 450 INJECTION, SOLUTION INTRAVENOUS at 06:50

## 2022-04-11 RX ADMIN — Medication 6 MG: at 22:13

## 2022-04-11 RX ADMIN — CARBIDOPA AND LEVODOPA 1 TABLET: 50; 200 TABLET, EXTENDED RELEASE ORAL at 14:53

## 2022-04-11 ASSESSMENT — PAIN DESCRIPTION - PROGRESSION
CLINICAL_PROGRESSION: GRADUALLY WORSENING

## 2022-04-11 ASSESSMENT — PAIN DESCRIPTION - ORIENTATION: ORIENTATION: LOWER

## 2022-04-11 ASSESSMENT — PAIN SCALES - GENERAL: PAINLEVEL_OUTOF10: 5

## 2022-04-11 ASSESSMENT — PAIN DESCRIPTION - PAIN TYPE: TYPE: CHRONIC PAIN

## 2022-04-11 ASSESSMENT — PAIN DESCRIPTION - LOCATION: LOCATION: BACK

## 2022-04-11 ASSESSMENT — PAIN - FUNCTIONAL ASSESSMENT: PAIN_FUNCTIONAL_ASSESSMENT: PREVENTS OR INTERFERES SOME ACTIVE ACTIVITIES AND ADLS

## 2022-04-11 ASSESSMENT — PAIN DESCRIPTION - DESCRIPTORS: DESCRIPTORS: DISCOMFORT

## 2022-04-11 ASSESSMENT — PAIN DESCRIPTION - ONSET: ONSET: ON-GOING

## 2022-04-11 ASSESSMENT — PAIN DESCRIPTION - FREQUENCY: FREQUENCY: INTERMITTENT

## 2022-04-11 NOTE — PLAN OF CARE
Pt up to chair. Call light and bedside table within reach. Family at bedside. Reminded Pt to call for assistance. Will continue to monitor.

## 2022-04-11 NOTE — CARE COORDINATION
INITIAL CASE MANAGEMENT ASSESSMENT     Reviewed chart, met with patient to assess possible discharge needs. Explained Case Management role/services. SW interviewed patient and friend Perfecto Whittington at bedside today. Patient gave this writer permission to speak freely in front of his friend.   Macho Still reports that he resides in a singel family  Home with no pets. There is one stair leading up to the home from the main entrance and two stairs leading up to the home from the garage entrance. Prior to medical admission he stated that he had no trouble getting in and out of the property.      ADLs: Prior to medical admission patient reports that he was independent. He stated that he doesn't anticipate any needs at discharge.      DME: Prior to medical admission patient reports that he used no durable medical equipment. He stated that he doesn't anticipate any needs at discharge.      PT/OT Recs: Not ordered.      Active Services: Prior to medical admission patient reports that he had no active services in place. He stated that she doesn't anticipate any needs at discharge.      Transportation: Prior to medical admission patient reports that he was an active . He stated that his friend Perfecto Whittington will transport him home at discharge.     Medications: Patient receives long term medications from [address] [place] [address] in [address] [place] [address] this time there are no issues with access or affordability.      PCP: Valentino Ser, [phone] (phone) and [phone] (fax number). Patient reports that his last appointment with this provider was almost two years ago. HD/PD: N/A     PLAN/COMMENTS:   1) Waiting on general surgery clearance, currently on clear liquids. 2) Discharge to home with family.      SW provided contact information for patient or family to call with any questions. SW will follow and assist as needed.     Respectfully submitted,     Clementine Hernandez MSW, LISW-S  [address] [facility] Worker  [phone]     Electronically signed by LANDON Andrea on 4/11/2022 at 11:50 AM

## 2022-04-11 NOTE — PLAN OF CARE
Problem: Falls - Risk of:  Goal: Will remain free from falls  Description: Will remain free from falls  4/10/2022 2117 by Rachele Rubio RN  Outcome: Ongoing     Problem: Falls - Risk of:  Goal: Absence of physical injury  Description: Absence of physical injury  4/10/2022 2117 by Rachele Rubio RN  Outcome: Ongoing     Problem: Infection:  Goal: Will remain free from infection  Description: Will remain free from infection  Outcome: Ongoing     Problem: Safety:  Goal: Free from accidental physical injury  Description: Free from accidental physical injury  Outcome: Ongoing     Problem: Safety:  Goal: Free from intentional harm  Description: Free from intentional harm  Outcome: Ongoing     Problem: Daily Care:  Goal: Daily care needs are met  Description: Daily care needs are met  Outcome: Ongoing     Problem: Pain:  Goal: Patient's pain/discomfort is manageable  Description: Patient's pain/discomfort is manageable  Outcome: Ongoing     Problem: Skin Integrity:  Goal: Skin integrity will stabilize  Description: Skin integrity will stabilize  Outcome: Ongoing     Problem: Discharge Planning:  Goal: Patients continuum of care needs are met  Description: Patients continuum of care needs are met  Outcome: Ongoing

## 2022-04-11 NOTE — PROGRESS NOTES
Hospitalist Progress Note      PCP: Kelsy Archuleta MD    Date of Admission: 4/10/2022    Chief Complaint:   Chief Complaint   Patient presents with    Abdominal Pain     started thursday, severe pain while eating dinner. then started vomiting that night. has not been able to eat without pain. feels dehydrated. dizzy when standing    Emesis         Hospital Course: 57-year-old male with multiple comorbidities admitted with small bowel obstruction. Patient denied prior abdominal surgery. 4/11  Today, repeat KUB showing improved gas pattern. He has been treated conservatively with pain medication as needed, no need for NG tube. Today he reported passing flatus and BM. Plan to advance diet. We will await for surgical clearance prior to discharge    Subjective: as above      Medications:  Reviewed    Infusion Medications    sodium chloride      dextrose 5% and 0.45% NaCl with KCl 20 mEq 75 mL/hr at 04/11/22 0650     Scheduled Medications    isosorbide mononitrate  30 mg Oral Daily    propranolol  60 mg Oral Daily    tamsulosin  0.4 mg Oral Daily    atorvastatin  40 mg Oral Nightly    sodium chloride flush  5-40 mL IntraVENous 2 times per day    enoxaparin  1 mg/kg SubCUTAneous BID    carbidopa-levodopa  1 tablet Oral TID    carbidopa-levodopa  2 tablet Oral QAM    carbidopa-levodopa  1 tablet Oral BID     PRN Meds: sodium chloride flush, sodium chloride, ondansetron **OR** ondansetron, acetaminophen **OR** acetaminophen, HYDROmorphone, lidocaine, melatonin      Intake/Output Summary (Last 24 hours) at 4/11/2022 1217  Last data filed at 4/11/2022 0626  Gross per 24 hour   Intake --   Output 1100 ml   Net -1100 ml       Physical Exam Performed:    /64   Pulse 66   Temp 98.7 °F (37.1 °C) (Oral)   Resp 18   Wt 169 lb 5 oz (76.8 kg)   SpO2 96%   BMI 24.29 kg/m²     General appearance: No apparent distress, appears stated age and cooperative.   HEENT: Pupils equal, round, and reactive to light. Conjunctivae/corneas clear. Neck: Supple, with full range of motion. No jugular venous distention. Trachea midline. Respiratory:  Normal respiratory effort. Clear to auscultation, bilaterally without Rales/Wheezes/Rhonchi. Cardiovascular: Regular rate and rhythm with normal S1/S2 without murmurs, rubs or gallops. Abdomen: Soft, non-tender, non-distended with normal bowel sounds. Musculoskeletal: No clubbing, cyanosis or edema bilaterally. Full range of motion without deformity. Skin: Skin color, texture, turgor normal.  No rashes or lesions. Neurologic:  Neurovascularly intact without any focal sensory/motor deficits. Cranial nerves: II-XII intact, grossly non-focal.  Psychiatric: Alert and oriented, thought content appropriate, normal insight  Capillary Refill: Brisk,3 seconds, normal   Peripheral Pulses: +2 palpable, equal bilaterally       Labs:   Recent Labs     04/10/22  0947   WBC 5.1   HGB 13.0*   HCT 39.0*        Recent Labs     04/10/22  0947 04/11/22  0415   * 138   K 3.8 4.0    106   CO2 22 21   BUN 35* 21*   CREATININE 1.2 1.1   CALCIUM 8.0* 8.1*     Recent Labs     04/10/22  0947   AST 28   ALT <5*   BILIDIR <0.2   BILITOT 0.7   ALKPHOS 32*     No results for input(s): INR in the last 72 hours. Recent Labs     04/10/22  0947   TROPONINI <0.01       Urinalysis:      Lab Results   Component Value Date    NITRU Negative 04/10/2022    WBCUA 0-2 04/10/2022    RBCUA 0-2 04/10/2022    BLOODU Negative 04/10/2022    SPECGRAV <=1.005 04/10/2022    GLUCOSEU Negative 04/10/2022       Radiology:  XR ABDOMEN (2 VIEWS)   Final Result   Improving pattern of prior small-bowel obstruction. CT ABDOMEN PELVIS W IV CONTRAST Additional Contrast? None   Final Result   1. Findings consistent with an early or partial small bowel obstruction. Gradual transition in the mid small bowel in the left hemiabdomen. 2. Indeterminate right hepatic lesion measuring 2.3 cm in size.   Recommend follow-up evaluation with hepatic MRI with contrast.   3. Mild prostatomegaly. XR CHEST PORTABLE   Final Result   No acute cardiopulmonary disease. Assessment/Plan:    Active Hospital Problems    Diagnosis     SBO (small bowel obstruction) (HCC) [K56.609]     Liver mass [R16.0]     Small bowel obstruction (HCC) [K56.609]     Atrial fibrillation (HCC) [I48.91]      Small bowel obstruction  Repeat KUB with improved pattern  Advance diet  Surgery recommendations noted     Orthostatic hypotension  Most likely related to volume depletion  Fluid resuscitation     Liver mass  2.3 cm liver mass noted, outpatient follow-up recommended     Atrial fibrillation  S/p pacemaker  Rate controlled, resume home medication, hold Eliquis in case surgery is needed  Lovenox       DVT Prophylaxis: lovenox   Diet: ADULT DIET;  Clear Liquid  Code Status: Full Code    PT/OT Eval Status: no  need    Dispo - return home     Jordy Henderson MD

## 2022-04-11 NOTE — ACP (ADVANCE CARE PLANNING)
Advance Care Planning     Advance Care Planning Activator (Inpatient)  Conversation Note      Date of ACP Conversation: 4/11/2022     Conversation Conducted with: Patient with Decision Making Capacity    ACP Activator: LANDON Soto    Health Care Decision Maker:     Current Designated Health Care Decision Maker:     Primary Decision Maker: [facility] - [phone]    Care Preferences    Ventilation: \"If you were in your present state of health and suddenly became very ill and were unable to breathe on your own, what would your preference be about the use of a ventilator (breathing machine) if it were available to you? \"      Would the patient desire the use of ventilator (breathing machine)?: yes    \"If your health worsens and it becomes clear that your chance of recovery is unlikely, what would your preference be about the use of a ventilator (breathing machine) if it were available to you? \"     Would the patient desire the use of ventilator (breathing machine)?: No      Resuscitation  \"CPR works best to restart the heart when there is a sudden event, like a heart attack, in someone who is otherwise healthy. Unfortunately, CPR does not typically restart the heart for people who have serious health conditions or who are very sick. \"    \"In the event your heart stopped as a result of an underlying serious health condition, would you want attempts to be made to restart your heart (answer \"yes\" for attempt to resuscitate) or would you prefer a natural death (answer \"no\" for do not attempt to resuscitate)? \" yes       [] Yes   [] No   Educated Patient / Aidan Frias regarding differences between Advance Directives and portable DNR orders.     Length of ACP Conversation in minutes:  2    Conversation Outcomes:  [x] ACP discussion completed  [] Existing advance directive reviewed with patient; no changes to patient's previously recorded wishes  [] New Advance Directive completed  [] Portable Do Not Rescitate prepared for Provider review and signature  [] POLST/POST/MOLST/MOST prepared for Provider review and signature      Follow-up plan:    [] Schedule follow-up conversation to continue planning  [] Referred individual to Provider for additional questions/concerns   [] Advised patient/agent/surrogate to review completed ACP document and update if needed with changes in condition, patient preferences or care setting    [x] This note routed to one or more involved healthcare providers Tamiko Angeles MD primary care physician. Respectfully submitted,    LANDON Joshua  [employer]   [phone]    Electronically signed by LANDON Portillo on 4/11/2022 at 12:03 PM

## 2022-04-11 NOTE — CONSULTS
Surgery Consult Note     Fede Giang PA-C  Pt Name: Linda Terry  MRN: [id number]  YOB: 1945  Date of evaluation: 4/11/2022  Primary Care Physician: Arlene Pierre MD  IMPRESSIONS:   1. Abdominal pain, nausea and emesis. Possible early bowel obstruction on CT scan yesterday. Abdominal xray's this AM show improving pattern of prior small-bowel obstruction. 2. A. Fib on ELiquis  PLANS:   1. Slowly advance diet as tolerated  2. Monitor and control pain  3. OOB and ambulate   4. Monitor for bowel function to improve  5. No general surgery plans a this time  SUBJECTIVE:   History of Chief Complaint:    Shyam Flores is a 67 y.o. male who presents with epigastric abdominal pain. This pain began last Thursday and was associated with nausea and emesis. He came into the ER and a CT scan showed him to have findings consistent with an early or partial SBO. HE was admitted to the hospital for further monitoring. This AM an abdominal xray was done and that show improving pattern of prior small-bowel obstruction. The patient is feeling better and wants to eat. He denies having any other pain. Denies any CP ,SOB, cough, lightheadedness or dizziness. Past Medical History  Reviewed  has a past medical history of Advance directive discussed with patient, Allergic rhinitis, Arrhythmia, Atrial fibrillation ([address] Benign essential tremor, Brachial neuritis, Chronic back pain, Erectile dysfunction, History of tobacco abuse, Hypertension, Parkinson disease ([address] and Symptomatic advanced heart block. Past Surgical History  Reviewed has a past surgical history that includes cervical laminectomy (1995) and lumbar laminectomy (1964). Medications  Prior to Admission medications    Medication Sig Start Date End Date Taking?  Authorizing Provider   tamsulosin (FLOMAX) 0.4 MG capsule Take 0.4 mg by mouth daily    Historical Provider, MD   atorvastatin (LIPITOR) 40 MG tablet Take 1 tablet by mouth nightly 3/28/22   Robson Kelly Avalos MD   apixaban (ELIQUIS) 5 MG TABS tablet Take 1 tablet by mouth 2 times daily 3/28/22   Kasia De La Paz MD   propranolol (INDERAL LA) 60 MG extended release capsule Take 1 capsule by mouth daily 1/20/22   Kasia De La Paz MD   isosorbide mononitrate (IMDUR) 30 MG extended release tablet Take 1 tablet by mouth daily  Patient not taking: Reported on 3/28/2022 1/13/22   Kasia De La Paz MD   carbidopa-levodopa (SINEMET CR)  MG per extended release tablet Take 1 tablet by mouth 3 times daily    Historical Provider, MD    Scheduled Meds:   isosorbide mononitrate  30 mg Oral Daily    propranolol  60 mg Oral Daily    tamsulosin  0.4 mg Oral Daily    atorvastatin  40 mg Oral Nightly    sodium chloride flush  5-40 mL IntraVENous 2 times per day    enoxaparin  1 mg/kg SubCUTAneous BID    carbidopa-levodopa  1 tablet Oral TID    carbidopa-levodopa  2 tablet Oral QAM    carbidopa-levodopa  1 tablet Oral BID     Continuous Infusions:   sodium chloride      dextrose 5% and 0.45% NaCl with KCl 20 mEq 75 mL/hr at 04/11/22 0650     PRN Meds:.sodium chloride flush, sodium chloride, ondansetron **OR** ondansetron, acetaminophen **OR** acetaminophen, HYDROmorphone, lidocaine, melatonin  Allergies  has No Known Allergies. Family History  Reviewedfamily history includes Other in his father; Stroke in his mother. Social History   reports that he quit smoking about 15 years ago. He smoked 0.00 packs per day for 40.00 years. He has quit using smokeless tobacco. He reports current alcohol use of about 15.0 standard drinks of alcohol per week. He reports that he does not use drugs. EDUCATION  Patient educated about their illness/diagnosis, stated above, and all questions answered. We discussed the importance of nutrition, medications they are taking, and healthy lifestyle.   Review of Systems:  General Denies any fever or chills  HEENT Denies any diplopia, tinnitus or vertigo  Resp Denies any shortness of breath, cough or wheezing  Cardiac Denies any chest pain, palpitations, claudication or edema  GI Denies any melena, hematochezia, hematemesis or pyrosis   Denies any frequency, urgency, hesitancy or incontinence  Heme Denies bruising or bleeding easily  Neuro Denies any focal motor or sensory deficits  OBJECTIVE:   VITALS:  weight is 169 lb 5 oz (76.8 kg). His oral temperature is 98.7 °F (37.1 °C). His blood pressure is 100/64 and his pulse is 66. His respiration is 18 and oxygen saturation is 96%. CONSTITUTIONAL: Alert and oriented times 3, no acute distress and cooperative to examination with proper mood and affect. SKIN: Skin color, texture, turgor normal. No rashes or lesions. LYMPH: no cervical nodes, no inguinal nodes  HEENT: Head is normocephalic, atraumatic. EOMI, PERRLA. NECK: Supple, symmetrical, trachea midline, no adenopathy, thyroid symmetric, not enlarged and no tenderness, skin normal.  CHEST/LUNGS: chest symmetric with normal A/P diameter, normal respiratory rate and rhythm  CARDIOVASCULAR: Heart sounds are normal.  Regular rate and rhythm  ABDOMEN: Mildly distended. Tenderness: minimal  RECTAL: deferred, not clinically indicated  NEUROLOGIC: There are no focalizing motor or sensory deficits. CN II-XII are grossly intact. Sasha Dilcia EXTREMITIES: no cyanosis, no clubbing and no edema.   LABS:     Recent Labs     04/10/22  0947 04/10/22  1118 04/11/22  0415   WBC 5.1  --   --    HGB 13.0*  --   --    HCT 39.0*  --   --      --   --    *  --  138   K 3.8  --  4.0     --  106   CO2 22  --  21   BUN 35*  --  21*   CREATININE 1.2  --  1.1   CALCIUM 8.0*  --  8.1*   AST 28  --   --    ALT <5*  --   --    BILITOT 0.7  --   --    BILIDIR <0.2  --   --    NITRU  --  Negative  --    COLORU  --  Straw  --      Recent Labs     04/10/22  0947   ALKPHOS 32*   ALT <5*   AST 28   BILITOT 0.7   BILIDIR <0.2   LABALBU 3.1*   LIPASE 47.0     CBC:   Lab Results   Component Value Date    WBC 5.1 04/10/2022    RBC 4.12 04/10/2022    HGB 13.0 04/10/2022    HCT 39.0 04/10/2022    MCV 94.5 04/10/2022    MCH 31.5 04/10/2022    MCHC 33.3 04/10/2022    RDW 13.0 04/10/2022     04/10/2022    MPV 8.3 04/10/2022     CMP:    Lab Results   Component Value Date     04/11/2022    K 4.0 04/11/2022     04/11/2022    CO2 21 04/11/2022    BUN 21 04/11/2022    CREATININE 1.1 04/11/2022    GFRAA >60 04/11/2022    GFRAA >60 02/05/2012    AGRATIO 1.5 10/01/2019    LABGLOM >60 04/11/2022    GLUCOSE 95 04/11/2022    PROT 6.2 04/10/2022    PROT 7.0 02/05/2012    LABALBU 3.1 04/10/2022    CALCIUM 8.1 04/11/2022    BILITOT 0.7 04/10/2022    ALKPHOS 32 04/10/2022    AST 28 04/10/2022    ALT <5 04/10/2022     Urine Culture:  No components found for: CURINE  Blood Culture:  No components found for: CBLOOD, CFUNGUSBL  RADIOLOGY:     CT scan abd/pelvis (4/10/22):   1. Findings consistent with an early or partial small bowel obstruction. Gradual transition in the mid small bowel in the left hemiabdomen. 2. Indeterminate right hepatic lesion measuring 2.3 cm in size.  Recommend   follow-up evaluation with hepatic MRI with contrast.   3. Mild prostatomegaly. Abd flat plate: (4/56/89): Improving pattern of prior small-bowel obstruction. Thank you for the interesting evaluation. Further recommendations to follow. Karoline Neff  General and Vascular Surgery [phone]  Electronically signed by Brittany Bills PA-C on 4/11/2022 at 11:47 AM      Attending      As per note above by Meg Velez  Patient was personally seen and examined by me today  Chart, labs and imaging reviewed    A/P  PSBO vs enteritis   X ray with improved dilation today   No surgical indication   Start PO   Home once tolerating PO, can advance to regular diet over next few days    Electronically signed by Patricia Tucker MD on 4/11/2022 at 3:19 PM

## 2022-04-12 VITALS
HEART RATE: 73 BPM | SYSTOLIC BLOOD PRESSURE: 98 MMHG | DIASTOLIC BLOOD PRESSURE: 52 MMHG | OXYGEN SATURATION: 95 % | BODY MASS INDEX: 24.67 KG/M2 | RESPIRATION RATE: 16 BRPM | TEMPERATURE: 97.7 F | WEIGHT: 171.96 LBS

## 2022-04-12 LAB
ANION GAP SERPL CALCULATED.3IONS-SCNC: 11 MMOL/L (ref 3–16)
BUN BLDV-MCNC: 11 MG/DL (ref 7–20)
CALCIUM SERPL-MCNC: 8.3 MG/DL (ref 8.3–10.6)
CHLORIDE BLD-SCNC: 108 MMOL/L (ref 99–110)
CO2: 18 MMOL/L (ref 21–32)
CREAT SERPL-MCNC: 1.1 MG/DL (ref 0.8–1.3)
GFR AFRICAN AMERICAN: >60
GFR NON-AFRICAN AMERICAN: >60
GLUCOSE BLD-MCNC: 98 MG/DL (ref 70–99)
LACTIC ACID: 0.9 MMOL/L (ref 0.4–2)
POTASSIUM REFLEX MAGNESIUM: 4.2 MMOL/L (ref 3.5–5.1)
SODIUM BLD-SCNC: 137 MMOL/L (ref 136–145)

## 2022-04-12 PROCEDURE — 80048 BASIC METABOLIC PNL TOTAL CA: CPT

## 2022-04-12 PROCEDURE — 36415 COLL VENOUS BLD VENIPUNCTURE: CPT

## 2022-04-12 PROCEDURE — APPNB45 APP NON BILLABLE 31-45 MINUTES: Performed by: NURSE PRACTITIONER

## 2022-04-12 PROCEDURE — 2580000003 HC RX 258: Performed by: INTERNAL MEDICINE

## 2022-04-12 PROCEDURE — APPSS45 APP SPLIT SHARED TIME 31-45 MINUTES: Performed by: NURSE PRACTITIONER

## 2022-04-12 PROCEDURE — 6370000000 HC RX 637 (ALT 250 FOR IP): Performed by: INTERNAL MEDICINE

## 2022-04-12 PROCEDURE — 6370000000 HC RX 637 (ALT 250 FOR IP): Performed by: SURGERY

## 2022-04-12 PROCEDURE — G0378 HOSPITAL OBSERVATION PER HR: HCPCS

## 2022-04-12 PROCEDURE — 83605 ASSAY OF LACTIC ACID: CPT

## 2022-04-12 RX ADMIN — CARBIDOPA AND LEVODOPA 2 TABLET: 50; 200 TABLET, EXTENDED RELEASE ORAL at 05:57

## 2022-04-12 RX ADMIN — ISOSORBIDE MONONITRATE 30 MG: 30 TABLET, EXTENDED RELEASE ORAL at 08:54

## 2022-04-12 RX ADMIN — CARBIDOPA AND LEVODOPA 1 TABLET: 50; 200 TABLET, EXTENDED RELEASE ORAL at 09:35

## 2022-04-12 RX ADMIN — APIXABAN 5 MG: 5 TABLET, FILM COATED ORAL at 05:52

## 2022-04-12 RX ADMIN — CARBIDOPA AND LEVODOPA 1 TABLET: 25; 100 TABLET ORAL at 12:36

## 2022-04-12 RX ADMIN — Medication 10 ML: at 08:54

## 2022-04-12 RX ADMIN — TAMSULOSIN HYDROCHLORIDE 0.4 MG: 0.4 CAPSULE ORAL at 08:54

## 2022-04-12 ASSESSMENT — PAIN DESCRIPTION - PROGRESSION
CLINICAL_PROGRESSION: GRADUALLY WORSENING
CLINICAL_PROGRESSION: GRADUALLY WORSENING

## 2022-04-12 ASSESSMENT — PAIN SCALES - GENERAL
PAINLEVEL_OUTOF10: 0
PAINLEVEL_OUTOF10: 0

## 2022-04-12 NOTE — PROGRESS NOTES
General Surgery    Feels OK from abdominal standpoint  Very upset his medications are being given on a different schedule than at home    [address] later if tolerates    Electronically signed by Nancy Muñiz MD on 4/12/2022 at 11:53 AM

## 2022-04-12 NOTE — PROGRESS NOTES
Pt tolerated lunch tray. Discharge instructions discussed with Pt. IV removed without complications. Dry dressing applied. Tele monitor removed. Wheelchair ordered for discharge.

## 2022-04-12 NOTE — PROGRESS NOTES
CALCIUM 8.0*  --    < > 8.3   AST 28  --   --   --    ALT <5*  --   --   --    BILITOT 0.7  --   --   --    BILIDIR <0.2  --   --   --    NITRU  --  Negative  --   --    COLORU  --  Straw  --   --     < > = values in this interval not displayed.        EDUCATION  Patient educated about Disease Process, Medications, Smoking Cessation, Oxygenation, Incentive Spirometry and Deep Breath and Cough, Diabetes, Hyperlipidemia, Smoking Cessation, Nutrition, Exercise and Hypertension    Electronically signed by MAL Jefferson CNP on 4/12/2022 at 1:10 PM      [facility]   [phone] Office  [phone] Fax

## 2022-04-12 NOTE — DISCHARGE INSTR - COC
Continuity of Care Form    Patient Name: Minnie Terry   :  1945  MRN:  [id number]    Admit date:  4/10/2022  Discharge date:  ***    Code Status Order: Full Code   Advance Directives:      Admitting Physician:  Brett Loya MD  PCP: Chilango Guzman MD    Discharging Nurse: [facility] Unit/Room#: [id number]/8147-93  Discharging Unit Phone Number: ***    Emergency Contact:   Extended Emergency Contact Information  Primary Emergency Contact: Rosa Garcia [address] Phone: [phone]  Mobile Phone: [phone]  Relation: Other   needed? No  Secondary Emergency Contact: Elyssa Rodríguez [address] Phone: [phone]  Mobile Phone: [phone]  Relation: Child   needed?  No    Past Surgical History:  Past Surgical History:   Procedure Laterality Date    CERVICAL LAMINECTOMY      ACDF    LUMBAR LAMINECTOMY  1964    with Boswell ankur       Immunization History:   Immunization History   Administered Date(s) Administered    Influenza, High Dose (Fluzone 65 yrs and older) 2017    Influenza, Quadv, IM, (6 mo and older Fluzone, Flulaval, Fluarix and 3 yrs and older Afluria) 10/20/2016    Influenza, Quadv, Recombinant, IM PF (Flublok 18 yrs and older) 10/02/2018    Influenza, Quadv, adjuvanted, 65 yrs +, IM, PF (Fluad) 2020, 2021    Influenza, Triv, inactivated, subunit, adjuvanted, IM (Fluad 65 yrs and older) 10/01/2019    Pneumococcal Conjugate 13-valent ([id number]) 10/20/2016    Pneumococcal Polysaccharide ([id number]) 10/02/2018    Tdap (Boostrix, Adacel) 2013       Active Problems:  Patient Active Problem List   Diagnosis Code    Arrhythmia I49.9    Allergic rhinitis J30.9    Erectile dysfunction N52.9    Back pain M54.9    Neuropathic pain of foot M79.2    Actinic keratoses L57.0    Scapular dysfunction M89.9    Peyronie's disease N48.6    Benign essential tremor G25.0    Symptomatic advanced Diet List:945239201}    Routes of Feeding: {CHP DME Other Feedings:503436007}  Liquids: {Slp liquid thickness:05476}  Daily Fluid Restriction: {CHP DME Yes amt example:508846545}  Last Modified Barium Swallow with Video (Video Swallowing Test): {Done Not Done ZVVY:170511323}    Treatments at the Time of Hospital Discharge:   Respiratory Treatments: ***  Oxygen Therapy:  {Therapy; copd oxygen:14738}  Ventilator:    { CC Vent FPTS:011924050}    Rehab Therapies: {THERAPEUTIC INTERVENTION:2920142398}  Weight Bearing Status/Restrictions: { CC Weight Bearin}  Other Medical Equipment (for information only, NOT a DME order):  {EQUIPMENT:724063168}  Other Treatments: ***    Patient's personal belongings (please select all that are sent with patient):  {[facility] DME Belongings:386834910}    RN SIGNATURE:  {Esignature:279720736}    CASE MANAGEMENT/SOCIAL WORK SECTION    Inpatient Status Date: ***    Readmission Risk Assessment Score:  Readmission Risk              Risk of Unplanned Readmission:  10           Discharging to Facility/ Agency   Name:   Address:  Phone:  Fax:    Dialysis Facility (if applicable)   Name:  Address:  Dialysis Schedule:  Phone:  Fax:    / signature: {Esignature:144374680}    PHYSICIAN SECTION    Prognosis: Good    Condition at Discharge: Stable    Rehab Potential (if transferring to Rehab): Good    Recommended Labs or Other Treatments After Discharge: Follow-up with PCP within 7 days from discharge, not doing so could have life-threatening consequences. Take medication as instructed;  return to the emergency department if persistent symptoms, experiencing side effects from medication including nausea vomiting or unable to keep medications down.      PHYSICIAN SIGNATURE:  Electronically signed by Connor Damian MD on 22 at 12:30 PM EDT

## 2022-04-12 NOTE — CARE COORDINATION
DISCHARGE SUMMARY     DATE OF DISCHARGE: 4/12/2022    DISCHARGE DESTINATION: Home with family    FACILITY: None    TRANSPORTATION: Family    Name: Bib Marsh  Relationship: girlfriend  Nelda Stubbs up Time: 1:00-1:30pm  Phone Number: [phone]    COMMENTS: SW spoke to Ms. Sixto Ruelas via telephone and she informs that she's at beside with patient and he is agreeable to discharge with no needs. They are waiting on discharge paperwork. No further needs noted unless indicated by patient, family and/or medical staff. Respectfully submitted,    LANDON Tejeda  [facility]   [phone]    Electronically signed by LANDON Rouse on 4/12/2022 at 12:35 PM

## 2022-04-12 NOTE — DISCHARGE SUMMARY
Hospital Medicine Discharge Summary    Patient ID: Bianca Babcock Day      Patient's PCP: Marifer Pierre MD    Admit Date: 4/10/2022     Discharge Date:   04/12/22      Admitting Provider: Pilar Tillman MD     Discharge Provider: Pilar Tillman MD     Discharge Diagnoses: Active Hospital Problems    Diagnosis     S[facility] (small bowel obstruction) [facility] [K56.609]     Liver mass [R16.0]     Small bowel obstruction (HCC) [K56.609]     Atrial fibrillation ([facility] [I48.91]        The patient was seen and examined on day of discharge and this discharge summary is in conjunction with any daily progress note from day of discharge. Hospital Course: 69-year-old male with past medical history significant for hypertension, atrial fibrillation, admitted with small bowel obstruction, which resolved on its own. Patient was evaluated by surgery. By the time of discharge, patient was tolerating p.o., having regular BMs, surgery cleared for discharge. Physical Exam Performed:     BP (!) 98/52   Pulse 73   Temp 97.7 °F (36.5 °C)   Resp 16   Wt 171 lb 15.3 oz (78 kg)   SpO2 95%   BMI 24.67 kg/m²       General appearance:  No apparent distress, appears stated age and cooperative. HEENT:  Normal cephalic, atraumatic without obvious deformity. Pupils equal, round, and reactive to light. Extra ocular muscles intact. Conjunctivae/corneas clear. Neck: Supple, with full range of motion. No jugular venous distention. Trachea midline. Respiratory:  Normal respiratory effort. Clear to auscultation, bilaterally without Rales/Wheezes/Rhonchi. Cardiovascular:  Regular rate and rhythm with normal S1/S2 without murmurs, rubs or gallops. Abdomen: Soft, non-tender, non-distended with normal bowel sounds. Musculoskeletal:  No clubbing, cyanosis or edema bilaterally. Full range of motion without deformity. Skin: Skin color, texture, turgor normal.  No rashes or lesions.   Neurologic: Details   tamsulosin (FLOMAX) 0.4 MG capsule Take 0.4 mg by mouth daily      atorvastatin (LIPITOR) 40 MG tablet Take 1 tablet by mouth nightly  Qty: 30 tablet, Refills: 5      apixaban (ELIQUIS) 5 MG TABS tablet Take 1 tablet by mouth 2 times daily  Qty: 60 tablet, Refills: 11      propranolol (INDERAL LA) 60 MG extended release capsule Take 1 capsule by mouth daily  Qty: 30 capsule, Refills: 5    Associated Diagnoses: Benign essential tremor      isosorbide mononitrate (IMDUR) 30 MG extended release tablet Take 1 tablet by mouth daily  Qty: 30 tablet, Refills: 0      carbidopa-levodopa (SINEMET CR)  MG per extended release tablet Take 1 tablet by mouth 3 times daily             Time Spent on discharge is more than 30 minutes in the examination, evaluation, counseling and review of medications and discharge plan. Signed:    Bessie Mtz MD   4/12/2022      Thank you Norberto Hdz MD for the opportunity to be involved in this patient's care. If you have any questions or concerns please feel free to contact me at [phone].

## 2022-04-12 NOTE — PLAN OF CARE
Problem: Falls - Risk of:  Goal: Will remain free from falls  Description: Will remain free from falls  4/12/2022 1238 by Satya Avelar RN  Outcome: Completed  4/12/2022 0751 by Satya Avelar RN  Outcome: Ongoing  Goal: Absence of physical injury  Description: Absence of physical injury  4/12/2022 1238 by Satya Avelar RN  Outcome: Completed  4/12/2022 0751 by Satya Avelar RN  Outcome: Ongoing     Problem: Infection:  Goal: Will remain free from infection  Description: Will remain free from infection  4/12/2022 1238 by Satya Avelar RN  Outcome: Completed  4/12/2022 0751 by Satya Avelar RN  Outcome: Ongoing     Problem: Safety:  Goal: Free from accidental physical injury  Description: Free from accidental physical injury  4/12/2022 1238 by Staya Avelar RN  Outcome: Completed  4/12/2022 0751 by Satya Avelar RN  Outcome: Ongoing  Goal: Free from intentional harm  Description: Free from intentional harm  4/12/2022 1238 by Satya Avelar RN  Outcome: Completed  4/12/2022 0751 by Satya Avelar RN  Outcome: Ongoing     Problem: Daily Care:  Goal: Daily care needs are met  Description: Daily care needs are met  4/12/2022 1238 by Satya Avelar RN  Outcome: Completed  4/12/2022 0751 by Satya Avelar RN  Outcome: Ongoing     Problem: Pain:  Goal: Patient's pain/discomfort is manageable  Description: Patient's pain/discomfort is manageable  4/12/2022 1238 by Satya Avelar RN  Outcome: Completed  4/12/2022 0751 by Satya Avelar RN  Outcome: Ongoing     Problem: Skin Integrity:  Goal: Skin integrity will stabilize  Description: Skin integrity will stabilize  4/12/2022 1238 by Satya Avelar RN  Outcome: Completed  4/12/2022 0751 by Satya Avelar RN  Outcome: Ongoing     Problem: Discharge Planning:  Goal: Patients continuum of care needs are met  Description: Patients continuum of care needs are met  4/12/2022 1238 by Satya Avelar RN  Outcome: Completed  4/12/2022 0751 by Satya Avelar RN  Outcome: Ongoing

## 2022-04-21 DIAGNOSIS — I48.0 PAF (PAROXYSMAL ATRIAL FIBRILLATION) (HCC): ICD-10-CM

## 2022-04-21 DIAGNOSIS — I48.3 TYPICAL ATRIAL FLUTTER (HCC): ICD-10-CM

## 2022-04-21 LAB
ABO/RH: NORMAL
ANION GAP SERPL CALCULATED.3IONS-SCNC: 14 MMOL/L (ref 3–16)
ANTIBODY SCREEN: NORMAL
BUN BLDV-MCNC: 21 MG/DL (ref 7–20)
CALCIUM SERPL-MCNC: 9.4 MG/DL (ref 8.3–10.6)
CHLORIDE BLD-SCNC: 105 MMOL/L (ref 99–110)
CO2: 24 MMOL/L (ref 21–32)
CREAT SERPL-MCNC: 1.3 MG/DL (ref 0.8–1.3)
GFR AFRICAN AMERICAN: >60
GFR NON-AFRICAN AMERICAN: 53
GLUCOSE BLD-MCNC: 78 MG/DL (ref 70–99)
HCT VFR BLD CALC: 40.9 % (ref 40.5–52.5)
HEMOGLOBIN: 13.6 G/DL (ref 13.5–17.5)
MCH RBC QN AUTO: 31.5 PG (ref 26–34)
MCHC RBC AUTO-ENTMCNC: 33.2 G/DL (ref 31–36)
MCV RBC AUTO: 94.7 FL (ref 80–100)
PDW BLD-RTO: 12.9 % (ref 12.4–15.4)
PLATELET # BLD: 314 K/UL (ref 135–450)
PMV BLD AUTO: 8.3 FL (ref 5–10.5)
POTASSIUM SERPL-SCNC: 4.3 MMOL/L (ref 3.5–5.1)
RBC # BLD: 4.32 M/UL (ref 4.2–5.9)
SODIUM BLD-SCNC: 143 MMOL/L (ref 136–145)
WBC # BLD: 5.8 K/UL (ref 4–11)

## 2022-04-26 ENCOUNTER — OFFICE VISIT (OUTPATIENT)
Dept: FAMILY MEDICINE CLINIC | Age: 77
End: 2022-04-26
Payer: MEDICARE

## 2022-04-26 ENCOUNTER — NURSE ONLY (OUTPATIENT)
Dept: CARDIOLOGY CLINIC | Age: 77
End: 2022-04-26
Payer: MEDICARE

## 2022-04-26 VITALS
DIASTOLIC BLOOD PRESSURE: 83 MMHG | HEIGHT: 70 IN | RESPIRATION RATE: 16 BRPM | OXYGEN SATURATION: 100 % | WEIGHT: 165 LBS | SYSTOLIC BLOOD PRESSURE: 130 MMHG | HEART RATE: 67 BPM | BODY MASS INDEX: 23.62 KG/M2

## 2022-04-26 DIAGNOSIS — R35.1 BENIGN PROSTATIC HYPERPLASIA WITH NOCTURIA: ICD-10-CM

## 2022-04-26 DIAGNOSIS — I48.0 PAROXYSMAL ATRIAL FIBRILLATION (HCC): ICD-10-CM

## 2022-04-26 DIAGNOSIS — R16.0 LIVER MASS: ICD-10-CM

## 2022-04-26 DIAGNOSIS — N40.1 BENIGN PROSTATIC HYPERPLASIA WITH NOCTURIA: ICD-10-CM

## 2022-04-26 DIAGNOSIS — Z95.0 PACEMAKER: ICD-10-CM

## 2022-04-26 DIAGNOSIS — I44.1 SYMPTOMATIC ADVANCED HEART BLOCK: ICD-10-CM

## 2022-04-26 DIAGNOSIS — G20 PARKINSON DISEASE (HCC): ICD-10-CM

## 2022-04-26 DIAGNOSIS — Z87.891 HISTORY OF TOBACCO ABUSE: ICD-10-CM

## 2022-04-26 DIAGNOSIS — Z87.19 HX SBO: ICD-10-CM

## 2022-04-26 DIAGNOSIS — E78.2 MIXED HYPERLIPIDEMIA: Primary | ICD-10-CM

## 2022-04-26 DIAGNOSIS — R00.1 SYMPTOMATIC BRADYCARDIA: ICD-10-CM

## 2022-04-26 PROBLEM — K56.609 SMALL BOWEL OBSTRUCTION (HCC): Status: RESOLVED | Noted: 2022-04-10 | Resolved: 2022-04-26

## 2022-04-26 PROCEDURE — 99212 OFFICE O/P EST SF 10 MIN: CPT | Performed by: INTERNAL MEDICINE

## 2022-04-26 PROCEDURE — 93294 REM INTERROG EVL PM/LDLS PM: CPT | Performed by: INTERNAL MEDICINE

## 2022-04-26 PROCEDURE — 93296 REM INTERROG EVL PM/IDS: CPT | Performed by: INTERNAL MEDICINE

## 2022-04-26 PROCEDURE — 36415 COLL VENOUS BLD VENIPUNCTURE: CPT | Performed by: INTERNAL MEDICINE

## 2022-04-26 SDOH — ECONOMIC STABILITY: FOOD INSECURITY: WITHIN THE PAST 12 MONTHS, YOU WORRIED THAT YOUR FOOD WOULD RUN OUT BEFORE YOU GOT MONEY TO BUY MORE.: NEVER TRUE

## 2022-04-26 SDOH — ECONOMIC STABILITY: FOOD INSECURITY: WITHIN THE PAST 12 MONTHS, THE FOOD YOU BOUGHT JUST DIDN'T LAST AND YOU DIDN'T HAVE MONEY TO GET MORE.: NEVER TRUE

## 2022-04-26 ASSESSMENT — PATIENT HEALTH QUESTIONNAIRE - PHQ9
SUM OF ALL RESPONSES TO PHQ QUESTIONS 1-9: 0
SUM OF ALL RESPONSES TO PHQ QUESTIONS 1-9: 0
SUM OF ALL RESPONSES TO PHQ9 QUESTIONS 1 & 2: 0
SUM OF ALL RESPONSES TO PHQ QUESTIONS 1-9: 0
1. LITTLE INTEREST OR PLEASURE IN DOING THINGS: 0
SUM OF ALL RESPONSES TO PHQ QUESTIONS 1-9: 0
2. FEELING DOWN, DEPRESSED OR HOPELESS: 0

## 2022-04-26 ASSESSMENT — SOCIAL DETERMINANTS OF HEALTH (SDOH): HOW HARD IS IT FOR YOU TO PAY FOR THE VERY BASICS LIKE FOOD, HOUSING, MEDICAL CARE, AND HEATING?: NOT HARD AT ALL

## 2022-04-26 NOTE — PROGRESS NOTES
We received remote transmission from patient's dual chamber pacemaker monitor at home. Transmission shows normal sensing and pacing function. No new arrhythmias/events recorded. Ap 55%   84%  Echo 09.2021 showed EF of 50%. TI is normal (slight decreasing trend above reference line). Pt scheduled for AF/L ABL/WSW 04.27.22.    EP physician will review. See interrogation under cardiology tab in the [address] [place] [address] [address] field for more details. Will continue to monitor remotely.

## 2022-04-26 NOTE — PROGRESS NOTES
ipi    HPI: Natalie Terry presents for hospital follow-up and health issues       Health issues include osteoarthritis, tobacco history, tremor question Parkinson's, pacemaker, paroxysmal atrial fibrillation, transient small bowel obstruction    Hospitalization 4/10/2022 for small bowel obstruction, hypertension, atrial fibrillation. No surgery needed. CT abdomen with indeterminate right hepatic lesion 2.3 cm in MRI follow-up recommended. He is eating again. Feeling better. Dermatology yesterday and had skin lesions removed. Onset of intermittent atrial fibrillation while he was in [place]. Underwent cardiac catheterization with mild coronary disease and ejection fraction of 63%. Carotid ultrasounds were negative. Darted on Eliquis and atorvastatin. He is to have an ablation tomorrow. Holding Eliquis currently. Hoping to discontinue this. Has a Biotronik PPM implant since 2017 when he suffered from bradycardia. States propranolol was discontinued     History of recurrent back surgeries film with large anterior osteophytes in the L3 4 level and L2-3. History of L4 5 fusion. Notes over the last year and a half he has had intermittent pain in the right flank.    States no change.      He has a 50 year history of smoking. CT of the chest 2019 with stable pulm nodules and left lung base atelectasis. inceidental findings of stable thyroid cyst DJD liver hemangiomas, diverticulosis and atherosclerosis aorta without aneurysm. Has seen pulmonary who feels shortness of breath is cardiac in nature      History tremor. States this is better with Sinemet. States he has Parkinson's.    Follows with neurologist in [facility] treated with Sinemet    Prostatism with tamsulosin continues to have frequent urination. Is following up with urologist.    Incidental finding of a liver mass on CT in April. Is aware of this not interested in following up with MRI at this time.     States he has had 3 COVID vaccines.     Specialist    Neurology     ortho     Cardiology     PMH:   Pacemaker, back surgery cervical laminectomy, cardiac catheterization        Social history:  50-pack-year tobacco history . Marijuana. No exercise outside of [facility] in the winter. [address] Has a girlfriend.   .  Family history CVA     Review of systems back pain, sun damaged skin, no sunscreen. Shortness of breath going up steps. Arrhythmias without symptoms no bowel obstruction resolved. . Rare GE reflux. No bloody stools. Polyuria.  Arthralgias.  Denies any cognitive use.  No wheezing shortness of breath GE reflux.  Has hard stools.  No difficulty with urine stream.    Constitutional, ent, CV, respiratory, GI, , joint, skin, allergic and psychiatric ROS reviewed and negative except for above    No Known Allergies    Outpatient Medications Marked as Taking for the 4/26/22 encounter (Office Visit) with Roman Meyer MD   Medication Sig Dispense Refill    tamsulosin (FLOMAX) 0.4 MG capsule Take 0.4 mg by mouth daily      atorvastatin (LIPITOR) 40 MG tablet Take 1 tablet by mouth nightly 30 tablet 5    carbidopa-levodopa (SINEMET CR)  MG per extended release tablet Take 1 tablet by mouth 3 times daily               Past Medical History:   Diagnosis Date    Advance directive discussed with patient     DNR - Has Living will    Allergic rhinitis     Arrhythmia     s/p pacemaker for heart block: Dr. Jeff Saldana 7/31/17    Atrial fibrillation ([facility] [address] 4/10/2022    Benign essential tremor 2/27/2017    Brachial neuritis     Chronic back pain     Erectile dysfunction 2/13/2012    History of tobacco abuse 10/1/2019    > 50 pack year    Hypertension 2/13/2012    Parkinson disease ([facility] [address]     Dr. Huong Gao    Symptomatic advanced heart block        Past Surgical History:   Procedure Laterality Date   [address]    ACDF    LUMBAR LAMINECTOMY  1964    with Boswell ankur             Family History   Problem Relation Age of Onset    Stroke Mother          80    Other Father          80 Viral Meningitis           Objective     /83   Pulse 67   Resp 16   Ht 5' 10\" (1.778 m)   Wt 165 lb (74.8 kg)   SpO2 100% Comment: RA  BMI 23.68 kg/m²     @LASTSAO2(3)@    Wt Readings from Last 3 Encounters:   22 171 lb 15.3 oz (78 kg)   22 171 lb (77.6 kg)   22 174 lb (78.9 kg)       Physical Exam     NAD alert and cooperative  HEENT: None crowded hypopharynx. [place] conjunctive a. TMs unremarkable. No bruits. Good upstroke of the carotids. Lungs are clear no wheezes rales or rhonchi. Cardiovascular exam irregular beat. No JVD. No current atrial fibrillation. Abdomen no hepatosplenomegaly epigastric tenderness or mass. Good pulses lower extremities no peripheral edema. Bandaged skin biopsies on temple area  He is alert, well-groomed.   Good eye contact        Chemistry        Component Value Date/Time     2022 0841    K 4.3 2022 0841    K 4.2 2022 0621     2022 0841    CO2 24 2022 0841    BUN 21 (H) 2022 0841    CREATININE 1.3 2022 0841        Component Value Date/Time    CALCIUM 9.4 2022 0841    ALKPHOS 32 (L) 04/10/2022 0947    AST 28 04/10/2022 0947    ALT <5 (L) 04/10/2022 0947    BILITOT 0.7 04/10/2022 0947            Lab Results   Component Value Date    WBC 5.8 2022    HGB 13.6 2022    HCT 40.9 2022    MCV 94.7 2022     2022     Lab Results   Component Value Date    LABA1C 5.4 2019     No results found for: EAG  Lab Results   Component Value Date    LABA1C 5.4 2019     No components found for: CHLPL  Lab Results   Component Value Date    TRIG 73 10/01/2019    TRIG 131 2017    TRIG 104 2012     Lab Results   Component Value Date    HDL 71 (H) 10/01/2019    HDL 63 (H) 2017    HDL 48 2012     Lab Results   Component Value Date    LDLCALC 103 (H) 10/01/2019 LDLCALC 112 (H) 11/14/2017    LDLCALC 93 02/05/2012     Lab Results   Component Value Date    LABVLDL 15 10/01/2019    LABVLDL 26 11/14/2017    LABVLDL 21 02/05/2012       Old labs and records reviewed or requested  Discussed past lab and studies with patient      Diagnosis Orders   1. Mixed hyperlipidemia  Lipid Panel   2. Parkinson disease ([address]     3. Pacemaker     4. Liver mass     5. Hx SBO     6. History of tobacco abuse     7. Benign prostatic hyperplasia with nocturia     8. Paroxysmal atrial fibrillation (HCC)       Lipid profile added onto laboratories previously drawn. He declined labs today. Parkinsonism Sinemet seems to be improved with this. Liver mass. Not interested in MRI at this time. Resolved small bowel obstruction no current symptoms. History tobacco.    BPH. Paroxysmal atrial fibrillation to have ablation tomorrow. Osteoarthritis we did not discussed. On this date  I have spent 40 minutes reviewing previous notes, test results, and face to face with the patient discussing the diagnosis and plan          No follow-ups on file. Diagnosis and treatment discussed.   Possible side effects of medication reviewed  Patients questions answered  Follow up understood  Pt aware if they are not contacted about any test results , this does not mean they are normal.  They should call

## 2022-04-27 ENCOUNTER — HOSPITAL ENCOUNTER (OUTPATIENT)
Dept: CARDIAC CATH/INVASIVE PROCEDURES | Age: 77
Discharge: HOME | End: 2022-04-27
Payer: MEDICARE

## 2022-04-27 ENCOUNTER — ANESTHESIA EVENT (OUTPATIENT)
Dept: CARDIAC CATH/INVASIVE PROCEDURES | Age: 77
End: 2022-04-27

## 2022-04-27 ENCOUNTER — ANESTHESIA (OUTPATIENT)
Dept: CARDIAC CATH/INVASIVE PROCEDURES | Age: 77
End: 2022-04-27

## 2022-04-27 VITALS
SYSTOLIC BLOOD PRESSURE: 144 MMHG | DIASTOLIC BLOOD PRESSURE: 63 MMHG | RESPIRATION RATE: 25 BRPM | OXYGEN SATURATION: 99 % | TEMPERATURE: 95.5 F

## 2022-04-27 VITALS
TEMPERATURE: 97.2 F | HEART RATE: 71 BPM | WEIGHT: 160 LBS | BODY MASS INDEX: 22.9 KG/M2 | RESPIRATION RATE: 16 BRPM | DIASTOLIC BLOOD PRESSURE: 50 MMHG | OXYGEN SATURATION: 98 % | HEIGHT: 70 IN | SYSTOLIC BLOOD PRESSURE: 134 MMHG

## 2022-04-27 DIAGNOSIS — I48.92 RIGHT ATRIAL FLUTTER BY ELECTROCARDIOGRAM (HCC): ICD-10-CM

## 2022-04-27 DIAGNOSIS — I48.92 LEFT ATRIAL FLUTTER BY ELECTROCARDIOGRAM (HCC): ICD-10-CM

## 2022-04-27 PROBLEM — Z98.890 H/O CARDIAC RADIOFREQUENCY ABLATION: Status: ACTIVE | Noted: 2022-04-27

## 2022-04-27 PROBLEM — R00.1 SYMPTOMATIC BRADYCARDIA: Status: RESOLVED | Noted: 2020-09-28 | Resolved: 2022-04-27

## 2022-04-27 LAB
ABO/RH: NORMAL
ANTIBODY SCREEN: NORMAL
CHOLESTEROL, TOTAL: 102 MG/DL (ref 0–199)
EKG ATRIAL RATE: 72 BPM
EKG DIAGNOSIS: NORMAL
EKG P AXIS: 82 DEGREES
EKG P-R INTERVAL: 288 MS
EKG Q-T INTERVAL: 236 MS
EKG QRS DURATION: 82 MS
EKG QTC CALCULATION (BAZETT): 296 MS
EKG R AXIS: -57 DEGREES
EKG T AXIS: 72 DEGREES
EKG VENTRICULAR RATE: 95 BPM
HDLC SERPL-MCNC: 40 MG/DL (ref 40–60)
LDL CHOLESTEROL CALCULATED: 52 MG/DL
POC ACT LR: 273 SEC
TRIGL SERPL-MCNC: 52 MG/DL (ref 0–150)
VLDLC SERPL CALC-MCNC: 10 MG/DL

## 2022-04-27 PROCEDURE — 93005 ELECTROCARDIOGRAM TRACING: CPT | Performed by: INTERNAL MEDICINE

## 2022-04-27 PROCEDURE — A4216 STERILE WATER/SALINE, 10 ML: HCPCS

## 2022-04-27 PROCEDURE — 93623 PRGRMD STIMJ&PACG IV RX NFS: CPT | Performed by: INTERNAL MEDICINE

## 2022-04-27 PROCEDURE — 86900 BLOOD TYPING SEROLOGIC ABO: CPT

## 2022-04-27 PROCEDURE — 86901 BLOOD TYPING SEROLOGIC RH(D): CPT

## 2022-04-27 PROCEDURE — 86850 RBC ANTIBODY SCREEN: CPT

## 2022-04-27 PROCEDURE — 2580000003 HC RX 258

## 2022-04-27 PROCEDURE — 93656 COMPRE EP EVAL ABLTJ ATR FIB: CPT | Performed by: INTERNAL MEDICINE

## 2022-04-27 PROCEDURE — 6370000000 HC RX 637 (ALT 250 FOR IP)

## 2022-04-27 PROCEDURE — 6360000002 HC RX W HCPCS

## 2022-04-27 PROCEDURE — 2709999900 HC NON-CHARGEABLE SUPPLY

## 2022-04-27 PROCEDURE — 93623 PRGRMD STIMJ&PACG IV RX NFS: CPT

## 2022-04-27 PROCEDURE — 93622 COMP EP EVAL L VENTR PAC&REC: CPT | Performed by: INTERNAL MEDICINE

## 2022-04-27 PROCEDURE — 93657 TX L/R ATRIAL FIB ADDL: CPT

## 2022-04-27 PROCEDURE — 2500000003 HC RX 250 WO HCPCS

## 2022-04-27 PROCEDURE — 93010 ELECTROCARDIOGRAM REPORT: CPT | Performed by: INTERNAL MEDICINE

## 2022-04-27 PROCEDURE — C1894 INTRO/SHEATH, NON-LASER: HCPCS

## 2022-04-27 PROCEDURE — 93655 ICAR CATH ABLTJ DSCRT ARRHYT: CPT

## 2022-04-27 PROCEDURE — 93622 COMP EP EVAL L VENTR PAC&REC: CPT

## 2022-04-27 PROCEDURE — C1730 CATH, EP, 19 OR FEW ELECT: HCPCS

## 2022-04-27 PROCEDURE — 93656 COMPRE EP EVAL ABLTJ ATR FIB: CPT

## 2022-04-27 PROCEDURE — C1759 CATH, INTRA ECHOCARDIOGRAPHY: HCPCS

## 2022-04-27 PROCEDURE — C1893 INTRO/SHEATH, FIXED,NON-PEEL: HCPCS

## 2022-04-27 PROCEDURE — 93655 ICAR CATH ABLTJ DSCRT ARRHYT: CPT | Performed by: INTERNAL MEDICINE

## 2022-04-27 PROCEDURE — 3700000001 HC ADD 15 MINUTES (ANESTHESIA)

## 2022-04-27 PROCEDURE — 7100000001 HC PACU RECOVERY - ADDTL 15 MIN

## 2022-04-27 PROCEDURE — 2580000003 HC RX 258: Performed by: INTERNAL MEDICINE

## 2022-04-27 PROCEDURE — 7100000000 HC PACU RECOVERY - FIRST 15 MIN

## 2022-04-27 PROCEDURE — 3700000000 HC ANESTHESIA ATTENDED CARE

## 2022-04-27 PROCEDURE — 93657 TX L/R ATRIAL FIB ADDL: CPT | Performed by: INTERNAL MEDICINE

## 2022-04-27 PROCEDURE — C1732 CATH, EP, DIAG/ABL, 3D/VECT: HCPCS

## 2022-04-27 PROCEDURE — 2720000010 HC SURG SUPPLY STERILE

## 2022-04-27 PROCEDURE — 85347 COAGULATION TIME ACTIVATED: CPT

## 2022-04-27 RX ORDER — SUCCINYLCHOLINE/SOD CL,ISO/PF 200MG/10ML
SYRINGE (ML) INTRAVENOUS PRN
Status: DISCONTINUED | OUTPATIENT
Start: 2022-04-27 | End: 2022-04-27 | Stop reason: SDUPTHER

## 2022-04-27 RX ORDER — SODIUM CHLORIDE 9 MG/ML
INJECTION, SOLUTION INTRAVENOUS PRN
Status: DISCONTINUED | OUTPATIENT
Start: 2022-04-27 | End: 2022-04-28 | Stop reason: HOSPADM

## 2022-04-27 RX ORDER — DOBUTAMINE HYDROCHLORIDE 200 MG/100ML
INJECTION INTRAVENOUS CONTINUOUS PRN
Status: DISCONTINUED | OUTPATIENT
Start: 2022-04-27 | End: 2022-04-27 | Stop reason: SDUPTHER

## 2022-04-27 RX ORDER — SODIUM CHLORIDE 0.9 % (FLUSH) 0.9 %
5-40 SYRINGE (ML) INJECTION PRN
Status: DISCONTINUED | OUTPATIENT
Start: 2022-04-27 | End: 2022-04-28 | Stop reason: HOSPADM

## 2022-04-27 RX ORDER — OXYCODONE HYDROCHLORIDE 5 MG/1
5 TABLET ORAL PRN
Status: DISCONTINUED | OUTPATIENT
Start: 2022-04-27 | End: 2022-04-27

## 2022-04-27 RX ORDER — ISOSORBIDE MONONITRATE 30 MG/1
30 TABLET, EXTENDED RELEASE ORAL DAILY
Status: DISCONTINUED | OUTPATIENT
Start: 2022-04-27 | End: 2022-04-28 | Stop reason: HOSPADM

## 2022-04-27 RX ORDER — LIDOCAINE HYDROCHLORIDE AND EPINEPHRINE BITARTRATE 20; .01 MG/ML; MG/ML
15 INJECTION, SOLUTION SUBCUTANEOUS SEE ADMIN INSTRUCTIONS
Status: DISCONTINUED | OUTPATIENT
Start: 2022-04-27 | End: 2022-04-28 | Stop reason: HOSPADM

## 2022-04-27 RX ORDER — HEPARIN SODIUM 1000 [USP'U]/ML
INJECTION, SOLUTION INTRAVENOUS; SUBCUTANEOUS PRN
Status: DISCONTINUED | OUTPATIENT
Start: 2022-04-27 | End: 2022-04-27 | Stop reason: SDUPTHER

## 2022-04-27 RX ORDER — TAMSULOSIN HYDROCHLORIDE 0.4 MG/1
0.4 CAPSULE ORAL DAILY
Status: DISCONTINUED | OUTPATIENT
Start: 2022-04-27 | End: 2022-04-28 | Stop reason: HOSPADM

## 2022-04-27 RX ORDER — FLECAINIDE ACETATE 100 MG/1
50 TABLET ORAL EVERY 12 HOURS SCHEDULED
Status: DISCONTINUED | OUTPATIENT
Start: 2022-04-27 | End: 2022-04-28 | Stop reason: HOSPADM

## 2022-04-27 RX ORDER — MIDAZOLAM HYDROCHLORIDE 1 MG/ML
INJECTION INTRAMUSCULAR; INTRAVENOUS PRN
Status: DISCONTINUED | OUTPATIENT
Start: 2022-04-27 | End: 2022-04-27 | Stop reason: SDUPTHER

## 2022-04-27 RX ORDER — PROPOFOL 10 MG/ML
INJECTION, EMULSION INTRAVENOUS PRN
Status: DISCONTINUED | OUTPATIENT
Start: 2022-04-27 | End: 2022-04-27 | Stop reason: SDUPTHER

## 2022-04-27 RX ORDER — ACETAMINOPHEN 325 MG/1
650 TABLET ORAL EVERY 4 HOURS PRN
Status: DISCONTINUED | OUTPATIENT
Start: 2022-04-27 | End: 2022-04-28 | Stop reason: HOSPADM

## 2022-04-27 RX ORDER — 0.9 % SODIUM CHLORIDE 0.9 %
1000 INTRAVENOUS SOLUTION INTRAVENOUS
Status: SHIPPED | OUTPATIENT
Start: 2022-04-27 | End: 2022-04-27

## 2022-04-27 RX ORDER — FENTANYL CITRATE 50 UG/ML
50 INJECTION, SOLUTION INTRAMUSCULAR; INTRAVENOUS EVERY 5 MIN PRN
Status: DISCONTINUED | OUTPATIENT
Start: 2022-04-27 | End: 2022-04-27

## 2022-04-27 RX ORDER — VECURONIUM BROMIDE 1 MG/ML
INJECTION, POWDER, LYOPHILIZED, FOR SOLUTION INTRAVENOUS PRN
Status: DISCONTINUED | OUTPATIENT
Start: 2022-04-27 | End: 2022-04-27 | Stop reason: SDUPTHER

## 2022-04-27 RX ORDER — SODIUM CHLORIDE 0.9 % (FLUSH) 0.9 %
5-40 SYRINGE (ML) INJECTION PRN
Status: DISCONTINUED | OUTPATIENT
Start: 2022-04-27 | End: 2022-04-27

## 2022-04-27 RX ORDER — METOPROLOL SUCCINATE 25 MG/1
12.5 TABLET, EXTENDED RELEASE ORAL DAILY
Qty: 30 TABLET | Refills: 3 | Status: SHIPPED | OUTPATIENT
Start: 2022-04-27 | End: 2022-07-28 | Stop reason: ALTCHOICE

## 2022-04-27 RX ORDER — SODIUM CHLORIDE 0.9 % (FLUSH) 0.9 %
5-40 SYRINGE (ML) INJECTION EVERY 12 HOURS SCHEDULED
Status: DISCONTINUED | OUTPATIENT
Start: 2022-04-27 | End: 2022-04-28 | Stop reason: HOSPADM

## 2022-04-27 RX ORDER — PROTAMINE SULFATE 10 MG/ML
30 INJECTION, SOLUTION INTRAVENOUS
Status: SHIPPED | OUTPATIENT
Start: 2022-04-27 | End: 2022-04-27

## 2022-04-27 RX ORDER — ONDANSETRON 2 MG/ML
INJECTION INTRAMUSCULAR; INTRAVENOUS PRN
Status: DISCONTINUED | OUTPATIENT
Start: 2022-04-27 | End: 2022-04-27 | Stop reason: SDUPTHER

## 2022-04-27 RX ORDER — ONDANSETRON 2 MG/ML
4 INJECTION INTRAMUSCULAR; INTRAVENOUS
Status: DISCONTINUED | OUTPATIENT
Start: 2022-04-27 | End: 2022-04-27

## 2022-04-27 RX ORDER — FLECAINIDE ACETATE 50 MG/1
50 TABLET ORAL EVERY 12 HOURS SCHEDULED
Qty: 60 TABLET | Refills: 3 | Status: SHIPPED | OUTPATIENT
Start: 2022-04-27 | End: 2022-04-27

## 2022-04-27 RX ORDER — OXYCODONE HYDROCHLORIDE 10 MG/1
10 TABLET ORAL PRN
Status: DISCONTINUED | OUTPATIENT
Start: 2022-04-27 | End: 2022-04-27

## 2022-04-27 RX ORDER — FLECAINIDE ACETATE 50 MG/1
25 TABLET ORAL EVERY 12 HOURS SCHEDULED
Qty: 60 TABLET | Refills: 3 | Status: SHIPPED
Start: 2022-04-27 | End: 2022-05-09 | Stop reason: SINTOL

## 2022-04-27 RX ORDER — CARBIDOPA AND LEVODOPA 50; 200 MG/1; MG/1
1 TABLET, EXTENDED RELEASE ORAL 3 TIMES DAILY
Status: DISCONTINUED | OUTPATIENT
Start: 2022-04-27 | End: 2022-04-28 | Stop reason: HOSPADM

## 2022-04-27 RX ORDER — FENTANYL CITRATE 50 UG/ML
INJECTION, SOLUTION INTRAMUSCULAR; INTRAVENOUS PRN
Status: DISCONTINUED | OUTPATIENT
Start: 2022-04-27 | End: 2022-04-27 | Stop reason: SDUPTHER

## 2022-04-27 RX ORDER — SODIUM CHLORIDE 0.9 % (FLUSH) 0.9 %
5-40 SYRINGE (ML) INJECTION EVERY 12 HOURS SCHEDULED
Status: DISCONTINUED | OUTPATIENT
Start: 2022-04-27 | End: 2022-04-27

## 2022-04-27 RX ORDER — FUROSEMIDE 10 MG/ML
INJECTION INTRAMUSCULAR; INTRAVENOUS PRN
Status: DISCONTINUED | OUTPATIENT
Start: 2022-04-27 | End: 2022-04-27 | Stop reason: SDUPTHER

## 2022-04-27 RX ORDER — SODIUM CHLORIDE 9 MG/ML
INJECTION INTRAVENOUS PRN
Status: DISCONTINUED | OUTPATIENT
Start: 2022-04-27 | End: 2022-04-27 | Stop reason: SDUPTHER

## 2022-04-27 RX ORDER — DEXAMETHASONE SODIUM PHOSPHATE 4 MG/ML
INJECTION, SOLUTION INTRA-ARTICULAR; INTRALESIONAL; INTRAMUSCULAR; INTRAVENOUS; SOFT TISSUE PRN
Status: DISCONTINUED | OUTPATIENT
Start: 2022-04-27 | End: 2022-04-27 | Stop reason: SDUPTHER

## 2022-04-27 RX ORDER — HEPARIN SODIUM 10000 [USP'U]/100ML
INJECTION, SOLUTION INTRAVENOUS CONTINUOUS PRN
Status: DISCONTINUED | OUTPATIENT
Start: 2022-04-27 | End: 2022-04-27 | Stop reason: SDUPTHER

## 2022-04-27 RX ORDER — PROPRANOLOL HCL 60 MG
60 CAPSULE, EXTENDED RELEASE 24HR ORAL DAILY
Status: DISCONTINUED | OUTPATIENT
Start: 2022-04-27 | End: 2022-04-28 | Stop reason: HOSPADM

## 2022-04-27 RX ORDER — FENTANYL CITRATE 50 UG/ML
25 INJECTION, SOLUTION INTRAMUSCULAR; INTRAVENOUS EVERY 5 MIN PRN
Status: DISCONTINUED | OUTPATIENT
Start: 2022-04-27 | End: 2022-04-27

## 2022-04-27 RX ORDER — ATORVASTATIN CALCIUM 40 MG/1
40 TABLET, FILM COATED ORAL NIGHTLY
Status: DISCONTINUED | OUTPATIENT
Start: 2022-04-27 | End: 2022-04-28 | Stop reason: HOSPADM

## 2022-04-27 RX ORDER — GLYCOPYRROLATE 0.2 MG/ML
INJECTION INTRAMUSCULAR; INTRAVENOUS PRN
Status: DISCONTINUED | OUTPATIENT
Start: 2022-04-27 | End: 2022-04-27 | Stop reason: SDUPTHER

## 2022-04-27 RX ORDER — SODIUM CHLORIDE 9 MG/ML
INJECTION, SOLUTION INTRAVENOUS PRN
Status: DISCONTINUED | OUTPATIENT
Start: 2022-04-27 | End: 2022-04-27

## 2022-04-27 RX ORDER — PROTAMINE SULFATE 10 MG/ML
INJECTION, SOLUTION INTRAVENOUS PRN
Status: DISCONTINUED | OUTPATIENT
Start: 2022-04-27 | End: 2022-04-27 | Stop reason: SDUPTHER

## 2022-04-27 RX ADMIN — GLYCOPYRROLATE 0.2 MG: 0.2 INJECTION, SOLUTION INTRAMUSCULAR; INTRAVENOUS at 09:56

## 2022-04-27 RX ADMIN — FENTANYL CITRATE 50 MCG: 50 INJECTION INTRAMUSCULAR; INTRAVENOUS at 09:47

## 2022-04-27 RX ADMIN — FUROSEMIDE 60 MG: 10 INJECTION, SOLUTION INTRAMUSCULAR; INTRAVENOUS at 12:18

## 2022-04-27 RX ADMIN — MIDAZOLAM 1 MG: 1 INJECTION INTRAMUSCULAR; INTRAVENOUS at 09:45

## 2022-04-27 RX ADMIN — PROPOFOL 100 MG: 10 INJECTION, EMULSION INTRAVENOUS at 09:48

## 2022-04-27 RX ADMIN — SODIUM CHLORIDE 10 ML: 9 INJECTION INTRAMUSCULAR; INTRAVENOUS; SUBCUTANEOUS at 09:58

## 2022-04-27 RX ADMIN — ONDANSETRON 4 MG: 2 INJECTION INTRAMUSCULAR; INTRAVENOUS at 09:56

## 2022-04-27 RX ADMIN — HEPARIN SODIUM 1000 UNITS: 1000 INJECTION INTRAVENOUS; SUBCUTANEOUS at 11:06

## 2022-04-27 RX ADMIN — VECURONIUM BROMIDE 5 MG: 1 INJECTION, POWDER, LYOPHILIZED, FOR SOLUTION INTRAVENOUS at 11:01

## 2022-04-27 RX ADMIN — HEPARIN SODIUM 3500 UNITS: 1000 INJECTION INTRAVENOUS; SUBCUTANEOUS at 10:39

## 2022-04-27 RX ADMIN — Medication 120 MG: at 09:49

## 2022-04-27 RX ADMIN — FENTANYL CITRATE 50 MCG: 50 INJECTION INTRAMUSCULAR; INTRAVENOUS at 11:10

## 2022-04-27 RX ADMIN — SODIUM CHLORIDE: 9 INJECTION, SOLUTION INTRAVENOUS at 09:38

## 2022-04-27 RX ADMIN — SUGAMMADEX 200 MG: 100 INJECTION, SOLUTION INTRAVENOUS at 12:19

## 2022-04-27 RX ADMIN — MIDAZOLAM 1 MG: 1 INJECTION INTRAMUSCULAR; INTRAVENOUS at 09:42

## 2022-04-27 RX ADMIN — PROTAMINE SULFATE 150 MG: 10 INJECTION, SOLUTION INTRAVENOUS at 12:18

## 2022-04-27 RX ADMIN — VECURONIUM BROMIDE 10 MG: 1 INJECTION, POWDER, LYOPHILIZED, FOR SOLUTION INTRAVENOUS at 09:58

## 2022-04-27 RX ADMIN — DOBUTAMINE HYDROCHLORIDE 10 MCG/KG/MIN: 200 INJECTION INTRAVENOUS at 12:11

## 2022-04-27 RX ADMIN — DEXAMETHASONE SODIUM PHOSPHATE 8 MG: 4 INJECTION, SOLUTION INTRAMUSCULAR; INTRAVENOUS at 09:56

## 2022-04-27 RX ADMIN — HEPARIN SODIUM 7000 UNITS/HR: 10000 INJECTION, SOLUTION INTRAVENOUS at 11:06

## 2022-04-27 RX ADMIN — HEPARIN SODIUM 3500 UNITS: 1000 INJECTION INTRAVENOUS; SUBCUTANEOUS at 10:46

## 2022-04-27 ASSESSMENT — PULMONARY FUNCTION TESTS
PIF_VALUE: 14
PIF_VALUE: 13
PIF_VALUE: 2
PIF_VALUE: 13
PIF_VALUE: 12
PIF_VALUE: 13
PIF_VALUE: 13
PIF_VALUE: 12
PIF_VALUE: 13
PIF_VALUE: 1
PIF_VALUE: 14
PIF_VALUE: 13
PIF_VALUE: 14
PIF_VALUE: 13
PIF_VALUE: 13
PIF_VALUE: 12
PIF_VALUE: 1
PIF_VALUE: 14
PIF_VALUE: 16
PIF_VALUE: 12
PIF_VALUE: 13
PIF_VALUE: 12
PIF_VALUE: 13
PIF_VALUE: 1
PIF_VALUE: 13
PIF_VALUE: 12
PIF_VALUE: 14
PIF_VALUE: 13
PIF_VALUE: 12
PIF_VALUE: 13
PIF_VALUE: 12
PIF_VALUE: 13
PIF_VALUE: 0
PIF_VALUE: 13
PIF_VALUE: 12
PIF_VALUE: 13
PIF_VALUE: 2
PIF_VALUE: 13
PIF_VALUE: 13
PIF_VALUE: 12
PIF_VALUE: 0
PIF_VALUE: 13
PIF_VALUE: 2
PIF_VALUE: 13
PIF_VALUE: 14
PIF_VALUE: 25
PIF_VALUE: 13
PIF_VALUE: 13
PIF_VALUE: 12
PIF_VALUE: 13
PIF_VALUE: 12
PIF_VALUE: 13
PIF_VALUE: 2
PIF_VALUE: 13
PIF_VALUE: 13
PIF_VALUE: 1
PIF_VALUE: 12
PIF_VALUE: 13
PIF_VALUE: 12
PIF_VALUE: 13
PIF_VALUE: 24
PIF_VALUE: 13
PIF_VALUE: 14
PIF_VALUE: 14
PIF_VALUE: 13
PIF_VALUE: 14
PIF_VALUE: 13
PIF_VALUE: 14
PIF_VALUE: 13
PIF_VALUE: 13
PIF_VALUE: 12
PIF_VALUE: 13
PIF_VALUE: 14
PIF_VALUE: 13
PIF_VALUE: 14

## 2022-04-27 ASSESSMENT — LIFESTYLE VARIABLES: SMOKING_STATUS: 0

## 2022-04-27 ASSESSMENT — ENCOUNTER SYMPTOMS: SHORTNESS OF BREATH: 0

## 2022-04-27 NOTE — PROGRESS NOTES
Late entry.     Pt recovered from cath lab post ablation, Pt is alert and oriented x4 on room air neuro checks are WNL, pt is NSR with pacer firing as normal/ A paced, pt family at bedside, pt able to void to urinal.     Handoff given to "[employer]"

## 2022-04-27 NOTE — ANESTHESIA POSTPROCEDURE EVALUATION
Department of Anesthesiology  Postprocedure Note    Patient: Onofre Terry  MRN: [id number]  YOB: 1945  Date of evaluation: 4/27/2022  Time:  2:27 PM     Procedure Summary     Date: 04/27/22 Room / Location: [facility] Cath Lab    Anesthesia Start: 6611 Anesthesia Stop: 5776    Procedure: WST NIKKO AFIB ABLATION W ANES Diagnosis:     Scheduled Providers:  Responsible Provider: Radha Encinas MD    Anesthesia Type: general ASA Status: 3          Anesthesia Type: general    Kaur Phase I:      Kaur Phase II:      Last vitals: Reviewed and per EMR flowsheets.        Anesthesia Post Evaluation    Patient location during evaluation: PACU  Patient participation: complete - patient participated  Level of consciousness: awake  Pain score: 0  Airway patency: patent  Nausea & Vomiting: no nausea and no vomiting  Complications: no  Cardiovascular status: blood pressure returned to baseline  Respiratory status: acceptable  Hydration status: euvolemic

## 2022-04-27 NOTE — ANESTHESIA PRE PROCEDURE
Department of Anesthesiology  Preprocedure Note       Name:  Davon Terry   Age:  67 y.o.  :  1945                                          MRN:  [id number]         Date:  2022      Surgeon: * No surgeons listed *    Procedure: * No procedures listed *    Medications prior to admission:   Prior to Admission medications    Medication Sig Start Date End Date Taking?  Authorizing Provider   tamsulosin (FLOMAX) 0.4 MG capsule Take 0.4 mg by mouth daily    Historical Provider, MD   atorvastatin (LIPITOR) 40 MG tablet Take 1 tablet by mouth nightly 3/28/22   Margarita Issa MD   apixaban (ELIQUIS) 5 MG TABS tablet Take 1 tablet by mouth 2 times daily  Patient not taking: Reported on 2022 3/28/22   Cody Monreal MD   propranolol (INDERAL LA) 60 MG extended release capsule Take 1 capsule by mouth daily 22   Cody Monreal MD   isosorbide mononitrate (IMDUR) 30 MG extended release tablet Take 1 tablet by mouth daily  Patient not taking: Reported on 3/28/2022 1/13/22   Cody Monreal MD   carbidopa-levodopa (SINEMET CR)  MG per extended release tablet Take 1 tablet by mouth 3 times daily    Historical Provider, MD       Current medications:    Current Outpatient Medications   Medication Sig Dispense Refill    tamsulosin (FLOMAX) 0.4 MG capsule Take 0.4 mg by mouth daily      atorvastatin (LIPITOR) 40 MG tablet Take 1 tablet by mouth nightly 30 tablet 5    apixaban (ELIQUIS) 5 MG TABS tablet Take 1 tablet by mouth 2 times daily (Patient not taking: Reported on 2022) 60 tablet 11    propranolol (INDERAL LA) 60 MG extended release capsule Take 1 capsule by mouth daily 30 capsule 5    isosorbide mononitrate (IMDUR) 30 MG extended release tablet Take 1 tablet by mouth daily (Patient not taking: Reported on 3/28/2022) 30 tablet 0    carbidopa-levodopa (SINEMET CR)  MG per extended release tablet Take 1 tablet by mouth 3 times daily       Current Facility-Administered Medications Medication Dose Route Frequency Provider Last Rate Last Admin    sodium chloride flush 0.9 % injection 5-40 mL  5-40 mL IntraVENous 2 times per day Mirza Lomax MD        sodium chloride flush 0.9 % injection 5-40 mL  5-40 mL IntraVENous PRN Mirza Lomax MD        0.9 % sodium chloride infusion   IntraVENous PRN Mirza Lomax MD           Allergies:  No Known Allergies    Problem List:    Patient Active Problem List   Diagnosis Code    Allergic rhinitis J30.9    Erectile dysfunction N52.9    Back pain M54.9    Neuropathic pain of foot M79.2    Actinic keratoses L57.0    Scapular dysfunction M89.9    Peyronie's disease N48.6    Benign essential tremor G25.0    Symptomatic advanced heart block I44.1    Pacemaker Z95.0    Parkinson disease ([address] G20    History of tobacco abuse Z87.891    Symptomatic bradycardia R00.1    Hx SBO Z87.19    Liver mass R16.0    BPH (benign prostatic hyperplasia) N40.0    Atrial fibrillation (HCC) I48.91    Orthostatic hypotension I95.1       Past Medical History:        Diagnosis Date    Advance directive discussed with patient     DNR - Has Living will    Allergic rhinitis     Arrhythmia     s/p pacemaker for heart block: Dr. Shari Ford 7/31/17    Atrial fibrillation ([facility] [address] 4/10/2022    Benign essential tremor 2/27/2017    Brachial neuritis     Chronic back pain     Erectile dysfunction 2/13/2012    History of tobacco abuse 10/1/2019    > 50 pack year    Hx SBO 4/10/2022    Hypertension 2/13/2012    Parkinson disease ([facility] [address]     Dr. Helena Rubio    Symptomatic advanced heart block        Past Surgical History:        Procedure Laterality Date    CERVICAL LAMINECTOMY  1995    ACDF    LUMBAR LAMINECTOMY  1964    with Boswell ankur       Social History:    Social History     Tobacco Use    Smoking status: Former Smoker     Packs/day: 0.00     Years: 40.00     Pack years: 0.00     Quit date: 2/12/2007     Years since quitting: 15.2    Smokeless tobacco: Former User   Substance Use Topics    Alcohol use: Yes     Alcohol/week: 15.0 standard drinks     Types: 15 Cans of beer per week     Comment: occassional                                Counseling given: Not Answered      Vital Signs (Current):   Vitals:    04/27/22 0700   BP: 122/78   Pulse: 85   Resp: 14   Temp: 97.2 °F (36.2 °C)   TempSrc: Infrared   SpO2: 100%   Weight: 160 lb (72.6 kg)   Height: 5' 10\" (1.778 m)                                              BP Readings from Last 3 Encounters:   04/27/22 122/78   04/26/22 130/83   04/12/22 (!) 98/52       NPO Status:                                                                                 BMI:   Wt Readings from Last 3 Encounters:   04/27/22 160 lb (72.6 kg)   04/26/22 165 lb (74.8 kg)   04/12/22 171 lb 15.3 oz (78 kg)     Body mass index is 22.96 kg/m². CBC:   Lab Results   Component Value Date    WBC 5.8 04/21/2022    RBC 4.32 04/21/2022    HGB 13.6 04/21/2022    HCT 40.9 04/21/2022    MCV 94.7 04/21/2022    RDW 12.9 04/21/2022     04/21/2022       CMP:   Lab Results   Component Value Date     04/21/2022    K 4.3 04/21/2022    K 4.2 04/12/2022     04/21/2022    CO2 24 04/21/2022    BUN 21 04/21/2022    CREATININE 1.3 04/21/2022    GFRAA >60 04/21/2022    GFRAA >60 02/05/2012    AGRATIO 1.5 10/01/2019    LABGLOM 53 04/21/2022    GLUCOSE 78 04/21/2022    PROT 6.2 04/10/2022    PROT 7.0 02/05/2012    CALCIUM 9.4 04/21/2022    BILITOT 0.7 04/10/2022    ALKPHOS 32 04/10/2022    AST 28 04/10/2022    ALT <5 04/10/2022       POC Tests: No results for input(s): POCGLU, POCNA, POCK, POCCL, POCBUN, POCHEMO, POCHCT in the last 72 hours.     Coags:   Lab Results   Component Value Date    PROTIME 11.1 07/24/2017    INR 0.98 07/24/2017       HCG (If Applicable): No results found for: PREGTESTUR, PREGSERUM, HCG, HCGQUANT     ABGs: No results found for: PHART, PO2ART, EXB8ABK, WIC5BQH, BEART, D5DXAQQB     Type & Screen (If Applicable):  No results found for: LABABO, LABRH    Drug/Infectious Status (If Applicable):  No results found for: HIV, HEPCAB    COVID-19 Screening (If Applicable): No results found for: COVID19        Anesthesia Evaluation  Patient summary reviewed and Nursing notes reviewed no history of anesthetic complications:   Airway: Mallampati: II  TM distance: >3 FB   Neck ROM: full  Mouth opening: > = 3 FB Dental:      Comment: No loose teeth    Pulmonary:       (-) pneumonia, COPD, asthma, shortness of breath, recent URI, sleep apnea and not a current smoker                          ROS comment: 50+pack year history   Cardiovascular:  Exercise tolerance: good (>4 METS),   (+) hypertension:, pacemaker:, dysrhythmias: atrial fibrillation,     (-) valvular problems/murmurs, CAD,  angina,  CHF, orthopnea, PND, no pulmonary hypertension and no hyperlipidemia      Rhythm: regular                      Neuro/Psych:   (+) neuromuscular disease:,    (-) seizures, TIA, CVA, headaches, psychiatric history and depression/anxiety             ROS comment: Parkinson's disease GI/Hepatic/Renal:        (-) hiatal hernia, GERD, PUD, hepatitis, liver disease, no renal disease, bowel prep and no morbid obesity       Endo/Other:    (+) blood dyscrasia::., no malignancy/cancer. (-) diabetes mellitus, hypothyroidism, hyperthyroidism, arthritis, no electrolyte abnormalities, no malignancy/cancer               Abdominal:             Vascular:     - PVD, DVT and PE. Other Findings:           Anesthesia Plan      general     ASA 3       Induction: intravenous. MIPS: Prophylactic antiemetics administered. Anesthetic plan and risks discussed with patient. Plan discussed with CRNA. Taz Ricks MD   4/27/2022        This pre-anesthesia assessment may be used as a history and physical.    DOS STAFF ADDENDUM:    Pt seen and examined, chart reviewed (including anesthesia, drug and allergy history).   No interval changes to history and physical examination. Anesthetic plan, risks, benefits, alternatives, and personnel involved discussed with patient. Patient verbalized an understanding and agrees to proceed.       Bruno Armenta MD  April 27, 2022  7:19 AM

## 2022-04-27 NOTE — PROCEDURES
[employer]     Electrophysiology Procedure Note       Date of Procedure: 4/27/2022  Patient's Name: Nelida Terry  YOB: 1945   Medical Record Number: [id number]  Referring Physician: Sam Lugo MD  Procedure Performed by: Myra Henry MD    Procedure performed:  · Electrophysiology study with radiofrequency ablation of atrial fibrillation and pulmonary vein isolation   · Ablation of roof-dependent left atrial flutter with  ms with straight up-down activation  · Ablation of cavotricuspid isthmus dependent right atrial flutter with TCL 238ms with proximal-distal activation  · Additional ablation with creation of a roof line (for roof-dependent left atrial flutter) and cavotricuspid isthmus (for right atrial flutter)  · Additional ablation of complex fractionated atrial electrograms (for atrial fibrillation) on the LA septal wall  · Additional ablation of complex fractionated atrial electrograms (for atrial fibrillation) on the anterior base of the LA appendage  · 3-D electroanatomical mapping of the left atrium    · Transseptal puncture through an intact septum x 2 under intracardiac ultrasound guidance without fluoroscopic guidance   · Intracardiac echocardiography  · Left ventricular pacing and recording  · Drug infusion with an attempt to induce atrial tachydysrhythmia  · Anesthesia: General anesthesia provided by the Anesthesia service    Indications for procedure:    Nelida Terry is a 67 y.o. male who has a history of paroxysmal atrial fibrillation and right atrial flutter who is symptomatic with symptoms of dyspnea with minimal exertion and fatigue who has failed antiarrhythmic therapy in the past is now here for an ablation for paroxysmal atrial fibrillation and right atrial flutter. Details of Procedure: The risks, benefits and alternatives of the ablation procedure were discussed with the patient.  The risks including, but not limited to, the risks of bleeding, infection, radiation exposure, injury to vascular, cardiac and surrounding structures (including pneumothorax), stroke, cardiac perforation, tamponade, need for emergent open heart surgery, need for pacemaker implantation, esophageal injury and fistula, myocardial infarction and death were discussed in detail. The patient was also counseled at length about the risks of jennifer Covid-19 in the greg-operative and post-operative states including the recovery window of their procedure. The patient was made aware that jennifer Covid-19 after a surgical procedure may worsen their prognosis for recovering from the virus and lend to a higher morbidity and or mortality risk. The patient was given the option of postponing their procedure. The patient was also presented reasonable alternatives to the proposed care, treatment, and services. The discussion I have had with the patient encompassed risks, benefits, and side effects related to the alternatives and the risks related to not receiving the proposed care, treatment and services. The patient opted to proceed with the ablation. Written informed consent was signed and placed in the chart. Patient was brought to the EP lab in a fasting non-sedate state. Patient underwent general anesthesia by anesthesia team. The patient was monitored continuously with ECG, pulse oximetry, blood pressure monitoring, and direct observation. No urinary perkins was placed in order to prevent any hematuria or urinary tract infection. Both groins were prepped in a sterile fashion. We gained access in the right femoral vein. One 8 Y[place]ut short sheath for ICE and subsequently for CS catheters were placed in the right femoral vein using modified seldinger technique. Two 8.5F SLO sheaths were introduced into the right femoral vein using modified Seldinger technique.  Then a CS cathter was placed inside the coronary sinus without fluoroscopy but with 3-D electroanatomic mapping isolation of these pulmonary veins and eradication of the PV fascicles. As we completed this antral ablation, the patient's atrial flutters terminated to sinus rhythm. Similarly, wide area circumferential ablations for the right sided pulmonary veins along with hudson ablation were performed which resulted in electrical isolation of these pulmonary veins and eradication of the PV fascicles. After isolating the pulmonary veins, given that the patient manifested a roof-dependent left atrial flutter, we ablated a linear line along the roof of the left atrium. Given that the patient manifested right atrial flutter and also demonstrated such clinically on device interrogation, radiofrequency lesions were delivered in a linear fashion to the cavotricuspid isthmus. As we were doing so, the patient was re-induced into cavotricuspid isthmus dependent right atrial flutter with TCL 238ms with proximal-distal activation. As we completed the cavotricuspid isthmus ablation, the tachycardia terminated to sinus rhythm. Bidirectional block was confirmed by noting trans-isthmus conduction time of 179 ms from the CS to a focus just lateral to the line, and a conduction time of 133 ms from the CS to a focus more lateral to the ablated line. Similarly, when pacing from the high lateral RA, the conduction time to the CS was 168 ms compared to a transit time of 187 ms just lateral to the ablated line to the CS. We then evaluated the left atrium for complex fractionated atrial electrogram, a separate mechanism that would initiate and/or perpetuate atrial fibrillation apart from the pulmonary vein fascicles and antral ablations. We found 2 site(s) on the septal aspect of the LA and ablated these foci.     We then evaluated the base of the LA appendage for complex fractionated atrial electrogram, a seprate mechanism that would initiate and/or perpetuate atrial fibrillation apart from the pulmonary vein fascicles and antral ablations. We found 1 site(s) on the anterior base of the LA appendage and ablated these foci. After ablation was complete, catheters were placed in the left and right atrium, His-position, right ventricle, and left ventricle for pacing and recording. Arrhythmia was attempted to be induced by rapid atrial and ventricular pacing, and there was no induction of atrial tachydysrhythmia. Maximum output (20mA) pacing in the L antrum and R antrum were also performed and showed dissociation from the rest of the left atrium. This was checked circumferentially around the antrums and verified many times. We evaluated the roof line and found that there was complete, bidirectional block. Adenosine bolus of 18mg was also given for each of the 4 pulmonary veins to assess for acute re-connection and to attempt to induce atrial fibrillation. We had adequate adenosine effect (AV blocks of > 3 seconds) and there were no pulmonary fascicles seen in any of the veins nor were there any atrial tachydysrhythmia induced. We then administered dobutamine infusion up to 10 mcg/kg/min in order to achieve at least a 20% increase in heart rate from the basal heart rate to induce any atrial tachydysrhythmia, and there were no atrial tachydysrhythmia induced. Bidirectional block was again confirmed on the cavotricuspid isthmus by noting trans-isthmus conduction time of 179 ms from the CS to a focus just lateral to the line, and a conduction time of 133 ms from the CS to a focus more lateral to the ablated line. Similarly, when pacing from the high lateral RA, the conduction time to the CS was 168 ms compared to a transit time of 187 ms just lateral to the ablated line to the CS. We then performed an EP study and programmed stimulation using our CS catheter and ablation catheter to assess the cardiac conduction system and to attempt to induce atrial tachydysrhythmia.  The ablation catheter were moved from the left atrium to the left ventricular apex and His bundle position. His bundle potentials was recorded and pacing and recordings were performed from right atrium, coronary sinus and LV apex with the following results:     AV paced at 1000 msec  UT interval was 130 msec  QRS duration was 160 msec (v-pacing)  QT interval was 490 msec  AH interval was 236 msec  HV interval was 46 msec (during non-v-pacing)  Pacing from left atrium, 1:1 conduction over AV node with (AV wenckebach) was 610  msec  Pacing from left atrium, AV yamilet ERP was < 700/540 msec   Pacing from LV apex, 1:1 retrograde conduction over AV node (VA wenckebach) was attempted but showed VA dissociation. Then both the ablation, Pentarray, and CS catheters were removed from the body, and all 3 sheaths were removed from the right femoral vein with a figure-of-eight suture that was placed to provide hemostasis while awaiting the downtrending of the ACT. Protamine 150mg IV x 1 was administered to partially reverse the IV heparin that was administered during the atrial fibrillation ablation procedure. Under intracardiac ultrasound guidance, we evaluated for pericardial effusion, and there was no evidence of such. Specimen collected: none    Estimated blood loss: < 50 cc    The patient tolerated the procedure well and there were no complications. Patient was extubated and transferred to the floor in stable condition.      Conclusion:     - Pre- and post-procedure diagnoses were paroxysmal atrial fibrillation, roof-dependent left atrial flutter with  ms with straight up-down activation, cavotricuspid isthmus dependent right atrial flutter with  ms with proximal-distal activation  - Pulmonary vein isolations using wide area circumferential radiofrequency ablation   - Elzbieta ablations on the R antrum  - Additional ablation with creation of roof line (for roof-dependent left atrial flutter), cavotricuspid isthmus (for right atrial flutter)  - Ablation of complex fractionated atrial electrogram in the left atrial septal wall (for atrial fibrillation)  - Ablation of complex fractionated atrial electrogram in the left atrial appendage anterior base (for atrial fibrillation)    Plan:   The patient will be monitored and receive the usual post ablation care. If there are no complications, the patient will be discharged from the hospital either later today or tomorrow with a new prescription for Flecainide but at a very low dose of 25mg po BID, a new prescription for Toprol XL 12.5mg daily, and pre-admission Eliquis 5mg po BID. The propanolol will be discontinued altogether (no indication). The patient will follow-up with the EP NP in 3 month's time. The patient's Biotronik 2-ch PPM will be interrogated as outpatient. Thank you for allowing us to participate in the care of your patient. If you have any questions or concerns, please do not hesitate to contact me.     Nicholas Mukherjee MD, MS, [facility]  Cardiac Electrophysiology  [address]  [address] [facility], [address] [place] [address] [place] [facility] [address]  [phone]

## 2022-04-27 NOTE — H&P
Cardiac Electrophysiology Consultation   Date: 4/27/2022  Reason for Consultation: Device Management/ atrial fibrillation  Consult Requesting Physician: Eber Marquez MD  Primary Care Physician: Reina Gregorio MD     Chief Complaint:        Chief Complaint   Patient presents with    Follow-up       Symptomatic advanced heart block - device check -  SOB         HPI: Galen Terry is a 67 y.o. patient with a history of HTN who was initially seen in consultation on 10/31/16 for bradycardia. Lizbeth Car had a long standing history of asymptomatic bradycardia that was not limiting his activity. It was decided to defer a PPM and montor-- however, one year later he reported progressive symptoms and decided to undergo Biotronik 2-chamber PPM implant 7/2017.      He was seen in office on 9/30/2019  for management of his device. He reported he is feeling well with the current setting of his pacemaker at DDDR. He stated that when his device was changed to VVI rate of 30 bpm he became lightheaded and passed out, thereby requiring a re-programming of the device to the current setting of DDDR. He is compliant with his medications and tolerating them well. He denies chest pain/pressure, tightness, edema, shortness of breath, heart racing, palpitations, lightheadedness, dizziness, syncope, presyncope,  PND or orthopnea.      Interval History: Today, he arrives for a follow up for device management and discuss new onset atrial fibrillation. He states symptoms of shortness of breath on exertion. He states that he underwent LHC in [place] in January 2022. Patient does not endorse any identifiable exacerbating or alleviating factors for the atrial flutter and or atrial fibrillation. He is compliant with his medications and tolerating them well.  He denies chest pain/pressure, tightness, edema, shortness of breath, heart racing, palpitations, lightheadedness, dizziness, syncope, presyncope,  PND or orthopnea.      Past Medical History Past Medical History:   Diagnosis Date    Advance directive discussed with patient       DNR - Has Living will    Allergic rhinitis      Arrhythmia       s/p pacemaker for heart block: Dr. Luis Guzman 7/31/17    Benign essential tremor 2/27/2017    Brachial neuritis      Chronic back pain      Erectile dysfunction 2/13/2012    History of tobacco abuse 10/1/2019     > 50 pack year    Hypertension 2/13/2012    Parkinson disease ([facility] [address]       Dr. Paul Khanna    Symptomatic advanced heart block              Past Surgical History         Past Surgical History:   Procedure Laterality Date    CERVICAL LAMINECTOMY   1995     ACDF    LUMBAR LAMINECTOMY   1964     with Boswell ankur            Allergies:  No Known Allergies     Medication:   Home Medications           Prior to Admission medications    Medication Sig Start Date End Date Taking? Authorizing Provider   tamsulosin (FLOMAX) 0.4 MG capsule Take 0.4 mg by mouth daily     Yes Historical Provider, MD   atorvastatin (LIPITOR) 40 MG tablet Take 1 tablet by mouth nightly 3/28/22   Yes Kiana Leonard MD   propranolol (INDERAL LA) 60 MG extended release capsule Take 1 capsule by mouth daily 1/20/22   Yes Paloma Snow MD   apixaban (ELIQUIS) 5 MG TABS tablet Take 1 tablet by mouth 2 times daily 1/20/22   Yes Paloma Snow MD   carbidopa-levodopa (SINEMET CR)  MG per extended release tablet Take 1 tablet by mouth 3 times daily     Yes Historical Provider, MD   isosorbide mononitrate (IMDUR) 30 MG extended release tablet Take 1 tablet by mouth daily  Patient not taking: Reported on 3/28/2022 1/13/22     Paloma Snow MD            Social History:   reports that he quit smoking about 15 years ago. He smoked 0.00 packs per day for 40.00 years. He has quit using smokeless tobacco. He reports current alcohol use of about 15.0 standard drinks of alcohol per week.  He reports that he does not use drugs.         Family History:  family history includes Other in his father; Stroke in his mother. Reviewed. Denies family history of sudden cardiac death, arrhythmia, premature CAD     Review of System:     · General ROS: negative for - chills, fever   · Psychological ROS: negative for - anxiety or depression  · Ophthalmic ROS: negative for - eye pain or loss of vision  · ENT ROS: negative for - epistaxis, headaches, nasal discharge, sore throat   · Allergy and Immunology ROS: negative for - hives, nasal congestion   · Hematological and Lymphatic ROS: negative for - bleeding problems, blood clots, bruising or jaundice  · Endocrine ROS: negative for - skin changes, temperature intolerance or unexpected weight changes  · Respiratory ROS: negative for - cough, hemoptysis, pleuritic pain, SOB, sputum changes or wheezing  · Cardiovascular ROS: Per HPI. · Gastrointestinal ROS: negative for - abdominal pain, blood in stools, diarrhea, hematemesis, melena, nausea/vomiting or swallowing difficulty/pain  · Genito-Urinary ROS: negative for - dysuria or incontinence  · Musculoskeletal ROS: negative for - joint swelling or muscle pain  · Neurological ROS: negative for - confusion, dizziness, gait disturbance, headaches, numbness/tingling, seizures, speech problems, tremors, visual changes or weakness  · Dermatological ROS: negative for - rash     Physical Examination:      Vitals:     03/28/22 0807   BP: 128/80   Pulse: 69   SpO2: 99%         · Constitutional: Oriented. No distress. · Head: Normocephalic and atraumatic. · Mouth/Throat: Oropharynx is clear and moist.   · Eyes: Conjunctivae normal. EOM are normal.   · Neck: Normal range of motion. Neck supple. No rigidity. No JVD present. · Cardiovascular: Normal rate, regular rhythm, S1&S2 and intact distal pulses. · Pulmonary/Chest: Bilateral respiratory sounds. No wheezes. No rhonchi. · Abdominal: Soft. Bowel sounds present. No distension, No tenderness. · Musculoskeletal: No tenderness.  No edema    · Lymphadenopathy: Has no cervical adenopathy. · Neurological: Alert and oriented. Cranial nerve appears intact, No Gross deficit   · Skin: Skin is warm and dry. No rash noted. · Psychiatric: Has a normal mood, affect and behavior      Labs:  Reviewed.      ECG: Sinus  rhythm with ventricular paced complexes that track sinus with v-rate of 70's bpm with QRS duration 128 ms. Occasional PVC.     Studies:   1. Event monitor:   None     2. Echo: 2/2012  Normal study. LVEF 60%     3. Stress Test: 9/13/2021  Summary    Pharmacological Stress/MPI Results:        1. Technically a satisfactory study.    2. No evidence of Ischemia by Myocardial Perfusion Imaging.    3. Gated Study shows normal LV size and Systolic function; EF is 74%.      4. Cath: 1/12/2022  Angiography showed:   1. Left main normal gives rise to LAD and left circumflex. 2. Medium size vessel that wraps around the apex gives rise to medium size diagonal branch.  There is mild disease proximal LAD.  LAD continues to wraparound apex. 3. Left circumflex large dominant vessel gives rise to 2 small OM 1 and 2 branches, medium size OM3 branch and left PDA.  No stenosis present. 4. RCA codominant.  Has mild nonobstructive disease proximally. LV filling pressure.  LVEDP is 8 mmHg   No AS   No complications. Ventricular ectopy present          5. Carotid US 9/13/2021  No hemodynamically significant carotid stenosis noted on either side.     I independently reviewed and interpreted the ECG, MCOT, echocardiogram, stress test, and coronary angiography/PCI results and used them for my plan of care. Procedures:  1. Biotronik dual chamber PPM implant on 8/7/2017 with Dr. Cherylene Amend     Assessment/Plan:      Atrial fibrillation  Atrial Flutter   -Symptomatic with shortness of breath.   -New onset atrial fibrillationfirst seen on device interrogation 12/23/2021.  -New onset atrial flutter seen on device interrogation today.   -He has a [id number] Score 2 (AGE).   -Continue Eliquis 5 mg BID for  thromboembolic risk reduction.  -Tolerating well no signs or symptoms of abnormal bruising or bleeding.     We educated the patient that atrial fibrillation is a worsening and progressive disease, with more frequent episodes that will ensue. Subsequent episodes usually become more sustained to the extent that many individuals would then develop persistent atrial fibrillation. Once persistence is reached, permanent atrial fibrillation is inevitable. We also discussed the fact that atrial fibrillation is associated with stroke, including life-threatening stroke, and therefore oral anticoagulation is warranted depending on the patient's [id number] score.      We discussed different management options for atrial fibrillation including their risks and benefits. These options include use of cardioversion (mainly for persisting atrial fibrillation or atrial flutter) which provides an effective immediate therapy with success rates of 75% or higher, but it provides no short nor long term efficacy.  Anti-arrhythmic medications provide a very effective short term therapy, but even with our most potent anti-arrhythmic medication there is limited long term efficacy (clinical studies have shown that 40% of patients remain atrial fibrillation-free after 4 years of follow-up after starting one of the more powerful anti-arrhythmic medication (amiodarone), and, if extrapolated, may have further diminishing success as time goes on). Atrial fibrillation ablation is a potentially curative therapy with very reasonable success rate after a first time procedure and with improving success rates with subsequent procedures.      The risks, benefits and alternatives of the atrial fibrillation ablation procedure were discussed with the patient.  The risks including, but not limited to, bleeding, infection, radiation exposure, injury to vascular, cardiac and surrounding structures (including pneumothorax), stroke, cardiac perforation, tamponade, need for emergent open heart surgery, need for pacemaker implantation, injury to the phrenic nerve, injury to the esophagus, myocardial infarction and death were discussed in detail. The patient was also counseled at length about the risks of jennifer Covid-19 in the greg-operative and post-operative states including the recovery window of their procedure. The patient was made aware that jennifer Covid-19 after a surgical procedure may worsen their prognosis for recovering from the virus and lend to a higher morbidity and or mortality risk. The patient was given the option of postponing their procedure. The patient was also presented reasonable alternatives to the proposed care, treatment, and services. The discussion I have had with the patient encompassed risks, benefits, and side effects related to the alternatives and the risks related to not receiving the proposed care, treatment and services.      I spent 40 minutes face to face with the patient, with greater than 50% of that time spent in counseling on the above.     The patient opted to proceed with the atrial fibrillation ablation. We will schedule for a radiofrequency ablation with [employer] Navigation system with a NIKKO procedure immediately prior to this ablation. A cardiac CTA will be ordered for pulmonary vein mapping prior to this procedure. We will hold Eliquis  for 12 hours prior to procedure.  We will order BMP, CBC, and Type & Screen prior to the procedure.      -Encouraged to watch \"That Sugar Film\" on [employer], which discusses the negative impact of processed sugar has on the body.     -Patient educated to eat a diet which includes organic fruits, vegetables and meats.     -Much time was spent counseling the patient on healthier lifestyle, following a low sodium diet <2400 mg of sodium a day and dramatically decreasing the intake of processed sugar.     Symptomatic advanced heart block  -S/p Biotronik PPM implant 8/7/2017  -Device interrogated  today and based on threshold, impedance, and intrinsic sensing tests run today, the device appears to be functioning normally.  -Remaining battery life is 5 years 4 months. -AP 57%  - 80%     Parkinson disease  -Managed by physician in [facility] appointment with Pulmonology.     CAD  -Left heart cath on 1/12/2022 showed mild nonobstructive disease proximally.   -Following with Dr. Yamileth Sim MD.     Follow ups: Follow up three months after procedure with Marion Billy CNP. Continue routine follow up with Dr. Yamileth Sim MD.     Thank you for allowing me to participate in the care of Avril Terry. All questions and concerns were addressed to the patient/family. Alternatives to my treatment were discussed.      I have reviewed the history and physical and examined the patient and find no relevant changes. I have reviewed with the patient and/or family the risks and benefits to the proposed procedure. The patient was presented with the option of postponing the proposed procedure. The patient was also presented reasonable alternatives to the proposed care, treatment, and services.  The discussion I have had with the patient encompassed risks, benefits, and side effects related to the alternatives and the risks related to not receiving the proposed care, treatment and services.      Dedra Fan MD, [address], [facility], [place] [address] Electrophysiology  [address], [address] [place] [address]  [facility] [address]  [phone]

## 2022-04-28 ENCOUNTER — NURSE ONLY (OUTPATIENT)
Dept: CARDIOLOGY CLINIC | Age: 77
End: 2022-04-28

## 2022-04-28 LAB
POC ACT LR: 345 SEC
POC ACT LR: >400 SEC

## 2022-04-28 NOTE — PROGRESS NOTES
ALERT remote transmission received 04.28.22 from patient's dual chamber pacemaker monitor at home d/t RA/RV lead failure detected. Transmission shows normal sensing and pacing function w RA/RV lead failure alerts appearing to be d/t AF/L ABL yesterday 04.27.22/WSW, causing polarity switches to unipolar on BOTH RA/RV leads. Noise noted on stored EGM also. No new arrhythmias/events recorded. Suggest device OV to further evaluate and adjust back to bipolar on RA/RV if normal function and to prevent likely oversensing in the future. Ap 55%   83%  Echo 09.2021 showed EF of 50%. TI is normal.    EP physician will review. See interrogation under cardiology tab in the [address] [facility] [address] field for more details. Will continue to monitor remotely.

## 2022-04-29 ENCOUNTER — TELEPHONE (OUTPATIENT)
Dept: CARDIOLOGY CLINIC | Age: 77
End: 2022-04-29

## 2022-04-29 NOTE — TELEPHONE ENCOUNTER
Medication Refill    Medication needing refilled:  atorvastatin (LIPITOR)  apixaban (ELIQUIS)    Dosage of the medication:  40 MG tablet    5 MG TABS tablet     How are you taking this medication (QD, BID, TID, QID, PRN):  Take 1 tablet by mouth nightly  Take 1 tablet by mouth 2 times daily    30 or 90 day supply called in: 30 day     When will you run out of your medication: soon     Which Pharmacy are we sending the medication to?:    Conchis Nicholas [id number] [facility], [facility] [place] [phone] Herrera Tucker [phone]   [address] [place] [address]   Phone:  [phone]  Fax:  [phone]

## 2022-05-09 ENCOUNTER — TELEPHONE (OUTPATIENT)
Dept: CARDIOLOGY CLINIC | Age: 77
End: 2022-05-09

## 2022-05-09 NOTE — TELEPHONE ENCOUNTER
Pt called would like to speak to dr or RN. Since he started the FLECAINIDE he has been extremely weak. He fell in his bathroom floor because he was too weak to stand. Did not pass out just could not hold himself up. Did not hit his head or anything.       Melissa Chambers # [phone]

## 2022-05-09 NOTE — TELEPHONE ENCOUNTER
Called patient and he says that over the weekend he was so weak after he took his flecainide. He was on the floor for 20 minutes before he could even roll over. He only took it once yesterday. He states that he is also having shortness of breath. S/p afib ablation 4/27/2022. Any further recommendations?

## 2022-05-16 ENCOUNTER — NURSE ONLY (OUTPATIENT)
Dept: CARDIOLOGY CLINIC | Age: 77
End: 2022-05-16
Payer: MEDICARE

## 2022-05-16 DIAGNOSIS — Z95.0 PACEMAKER: ICD-10-CM

## 2022-05-16 DIAGNOSIS — I48.92 LEFT ATRIAL FLUTTER BY ELECTROCARDIOGRAM (HCC): ICD-10-CM

## 2022-05-16 DIAGNOSIS — I48.0 PAROXYSMAL ATRIAL FIBRILLATION (HCC): ICD-10-CM

## 2022-05-16 DIAGNOSIS — I44.1 SYMPTOMATIC ADVANCED HEART BLOCK: ICD-10-CM

## 2022-05-16 PROCEDURE — 93280 PM DEVICE PROGR EVAL DUAL: CPT | Performed by: INTERNAL MEDICINE

## 2022-05-16 NOTE — PROGRESS NOTES
Pt seen in clinic today for cardiac device interrogation due to RA/RV lead failure alerts post AT/AF ablations. Their device is a BTK 2 chamber PPM implanted for CHB   Based on threshold, impedance, and intrinsic sensing tests run today, the device appears to be functioning normally. RA/RV unipolar settings changed back to bipolar. Remaining battery life is 4y5m  AP 58%                 84%      Device lists several episodes since ablation that all appear to be oversensing in unipolar. See PDF for EGM's    Pt notes SOB upon minimal exertion as well as heavy perspiration when feeling dizzy. - SX discussed with YASMANI Mahmood. Pt was informed of findings today and general questions have been answered with regard to device. Home monitoring hardware is transmitting on schedule.      Results discussed with or to be reviewed by EP

## 2022-07-26 ENCOUNTER — NURSE ONLY (OUTPATIENT)
Dept: CARDIOLOGY CLINIC | Age: 77
End: 2022-07-26

## 2022-07-26 PROCEDURE — 93296 REM INTERROG EVL PM/IDS: CPT | Performed by: INTERNAL MEDICINE

## 2022-07-26 PROCEDURE — 93294 REM INTERROG EVL PM/LDLS PM: CPT | Performed by: INTERNAL MEDICINE

## 2022-07-26 NOTE — PROGRESS NOTES
Remote transmission received from patient's dual chamber pacemaker monitor at home. Transmission shows normal sensing and pacing function. No new arrhythmias/events recorded. Pt on Toprol XL, Eliquis. Ap 62%   82%  PVCs 231/hr (increased trend noted)  Echo 09.2021 showed EF of 50%. TI is normal (slight decreasing trend at reference line). End of 91-day monitoring period 7/26/22. EP physician will review. See interrogation under cardiology tab in the [address] [place] [address] [address] field for more details. Will continue to monitor remotely.

## 2022-07-26 NOTE — PROGRESS NOTES
- Has Living will    Allergic rhinitis     Arrhythmia     s/p pacemaker for heart block: Dr. Lizett Dietz 17    Atrial fibrillation ([facility] [address] 4/10/2022    Benign essential tremor 2017    Brachial neuritis     Chronic back pain     Erectile dysfunction 2012    H/O cardiac radiofrequency ablation 2022 A fib flutter    History of tobacco abuse 10/1/2019    > 50 pack year    Hx SBO 4/10/2022    Hypertension 2012    Parkinson disease ([facility] [address]     Dr. Valentine Scales    Symptomatic advanced heart block        Past Surgical History:   Past Surgical History:   Procedure Laterality Date    CERVICAL LAMINECTOMY      ACDF    LUMBAR LAMINECTOMY      with Boswell ankur       Social History:   reports that he quit smoking about 15 years ago. His smoking use included cigarettes. He has quit using smokeless tobacco. He reports current alcohol use of about 15.0 standard drinks per week. He reports that he does not use drugs. Family History:      Problem Relation Age of Onset    Stroke Mother          80    Other Father          80 Viral Meningitis       Review of Systems   Constitutional:  Negative for chills, fatigue, fever and unexpected weight change. HENT:  Negative for congestion, hearing loss, sinus pressure, sore throat and trouble swallowing. Respiratory:  Positive for shortness of breath (PRECIADO). Negative for cough and wheezing. Cardiovascular:  Negative for chest pain, palpitations and leg swelling. Gastrointestinal:  Negative for abdominal pain, blood in stool, constipation, diarrhea, nausea and vomiting. Genitourinary:  Negative for hematuria. Musculoskeletal:  Negative for arthralgias, back pain, gait problem and myalgias. Skin:  Negative for color change, rash and wound. Neurological:  Positive for syncope and light-headedness. Negative for dizziness, seizures, speech difficulty and weakness. Hematological:  Does not bruise/bleed easily.       Physical Examination:  Vitals:    07/28/22 1347   BP: 130/74   Pulse: 60   SpO2: 97%      Wt Readings from Last 3 Encounters:   07/28/22 157 lb (71.2 kg)   04/27/22 160 lb (72.6 kg)   04/26/22 165 lb (74.8 kg)       Physical Exam  Vitals reviewed. Constitutional:       General: He is not in acute distress. Appearance: Normal appearance. HENT:      Head: Normocephalic and atraumatic. Nose: Nose normal.      Mouth/Throat:      Mouth: Mucous membranes are moist.   Eyes:      Conjunctiva/sclera: Conjunctivae normal.      Pupils: Pupils are equal, round, and reactive to light. Cardiovascular:      Rate and Rhythm: Normal rate and regular rhythm. Heart sounds: No murmur heard. No friction rub. No gallop. Pulmonary:      Effort: No respiratory distress. Breath sounds: No wheezing, rhonchi or rales. Abdominal:      General: Abdomen is flat. Bowel sounds are normal.      Palpations: Abdomen is soft. Musculoskeletal:         General: Normal range of motion. Right lower leg: No edema. Left lower leg: No edema. Skin:     General: Skin is warm and dry. Findings: No bruising. Neurological:      General: No focal deficit present. Mental Status: He is alert and oriented to person, place, and time. Motor: No weakness. Psychiatric:         Mood and Affect: Mood normal.         Behavior: Behavior normal.        Pertinent labs, diagnostic, device, and imaging results reviewed as a part of this visit    LABS    CBC:   Lab Results   Component Value Date    WBC 5.8 04/21/2022    HGB 13.6 04/21/2022    HCT 40.9 04/21/2022    MCV 94.7 04/21/2022     04/21/2022     BMP:   Lab Results   Component Value Date    CREATININE 1.3 04/21/2022    BUN 21 (H) 04/21/2022     04/21/2022    K 4.3 04/21/2022     04/21/2022    CO2 24 04/21/2022     Estimated Creatinine Clearance: 47 mL/min (based on SCr of 1.3 mg/dL).    No results found for: BNP    Thyroid:   Lab Results   Component Value Date    TSH 2.41 10/05/2015     Lipid Panel:   Lab Results   Component Value Date/Time    CHOL 102 2022 08:41 AM    HDL 40 2022 08:41 AM    HDL 48 2012 07:15 AM    TRIG 52 2022 08:41 AM     LFTs:  Lab Results   Component Value Date    ALT <5 (L) 04/10/2022    AST 28 04/10/2022    ALKPHOS 32 (L) 04/10/2022    BILITOT 0.7 04/10/2022     Coags:   Lab Results   Component Value Date    PROTIME 11.1 2017    INR 0.98 2017       EC22  AV paced at 61 BPM. Some V-paced complexes./    Echo: 2012  Normal study. LVEF 60%    Stress test: 3/8/18      Conclusions          Summary     Normal myocardial perfusion study. Normal myocardial perfusion. Normal LV size and systolic function. Overall findings represent a low risk study. Cath: 22  1. Left main normal gives rise to LAD and left circumflex. 2. Medium size vessel that wraps around the apex gives rise to medium size   diagonal branch. There is mild disease proximal LAD. LAD continues to   wraparound apex. 3. Left circumflex large dominant vessel gives rise to 2 small OM 1 and 2   branches, medium size OM3 branch and left PDA. No stenosis present. 4. RCA codominant. Has mild nonobstructive disease proximally. LV filling pressure. LVEDP is 8 mmHg   No AS   No complications. Procedures:  1. Dual chamber Biotronik pacemaker implant in 17 by Dr. Daron Srinivasan  2. Atrial fibrillation (PVI, CAFE), right atrial flutter (CTI) and left atrial flutter (roof line) ablation on 22, Dr. Bryan Pratt interrogation: 2022   Normal device function. Battery life 60%  A paced 62%  V paced 82%  No episodes.     Assessment & Plan:    Paroxysmal atrial fibrillation   - first seen on device interrogation in 2021   - s/p RFA and CAFE RFA on 22 with Dr. Fermin Arguello 3 (age, CAD) on Eliquis 5mg BID   - device interrogation on  with no arrhythmias   - stop flecainide and Toprol - may help PRECIADO   - repeat device interrogation at f/u in 1 month    Atrial flutter   - both R and L flutter seen on EP study s/p ablation on 4/27/22   - see above    Symptomatic bradycardia   - with premature junction beats, PVCs and blocked PACs   - s/p dual chamber Biotronik PPM implant on 8/7/17   - device interrogation as above, continue with routine follow up with device clinic   - had previous syncope when mode changed to VVI at 27 - currently programmed to DDDR    CAD   - \"mild nonobstructive\" disease noted on [facility] in January 2022   - on statin   - saw general cardiology in [place] and was told he didn't need to follow up    PRECIADO   - persistent since even before ablation   - check echo   - can't blame miranda as he has not had any since prior to ablation    Syncope   - likely secondary to hypotension from Parkinson's, Flomax and Toprol   - no episode of pacemaker to correlate   - stopping Toprol may help   - advised to sit/lie down if symptomatic, change positions slowly      Thank you for allowing to us to participate in the [facility]. Return in about 1 month (around 8/28/2022) for an appointment with Gerson Gil NP.      MAL Weber  [address], [address] [facility]  Phone: [phone]  Fax: [phone]    Electronically signed by MAL Clifton - CNP on 7/28/2022 at 2:23 PM

## 2022-07-28 ENCOUNTER — OFFICE VISIT (OUTPATIENT)
Dept: CARDIOLOGY CLINIC | Age: 77
End: 2022-07-28
Payer: MEDICARE

## 2022-07-28 VITALS
WEIGHT: 157 LBS | HEIGHT: 70 IN | OXYGEN SATURATION: 97 % | DIASTOLIC BLOOD PRESSURE: 74 MMHG | HEART RATE: 60 BPM | BODY MASS INDEX: 22.48 KG/M2 | SYSTOLIC BLOOD PRESSURE: 130 MMHG

## 2022-07-28 DIAGNOSIS — I48.92 RIGHT ATRIAL FLUTTER BY ELECTROCARDIOGRAM (HCC): ICD-10-CM

## 2022-07-28 DIAGNOSIS — Z95.0 PACEMAKER: ICD-10-CM

## 2022-07-28 DIAGNOSIS — I48.92 LEFT ATRIAL FLUTTER BY ELECTROCARDIOGRAM (HCC): ICD-10-CM

## 2022-07-28 DIAGNOSIS — R00.1 SYMPTOMATIC BRADYCARDIA: ICD-10-CM

## 2022-07-28 DIAGNOSIS — R06.09 DOE (DYSPNEA ON EXERTION): ICD-10-CM

## 2022-07-28 DIAGNOSIS — I48.0 PAROXYSMAL ATRIAL FIBRILLATION (HCC): Primary | ICD-10-CM

## 2022-07-28 DIAGNOSIS — I25.10 CORONARY ARTERY DISEASE INVOLVING NATIVE CORONARY ARTERY OF NATIVE HEART WITHOUT ANGINA PECTORIS: ICD-10-CM

## 2022-07-28 DIAGNOSIS — R55 SYNCOPE, UNSPECIFIED SYNCOPE TYPE: ICD-10-CM

## 2022-07-28 PROCEDURE — 99214 OFFICE O/P EST MOD 30 MIN: CPT | Performed by: NURSE PRACTITIONER

## 2022-07-28 PROCEDURE — 93000 ELECTROCARDIOGRAM COMPLETE: CPT | Performed by: NURSE PRACTITIONER

## 2022-07-28 PROCEDURE — 1124F ACP DISCUSS-NO DSCNMKR DOCD: CPT | Performed by: NURSE PRACTITIONER

## 2022-07-28 ASSESSMENT — ENCOUNTER SYMPTOMS
SORE THROAT: 0
COLOR CHANGE: 0
CONSTIPATION: 0
SINUS PRESSURE: 0
BLOOD IN STOOL: 0
WHEEZING: 0
COUGH: 0
SHORTNESS OF BREATH: 1
BACK PAIN: 0
TROUBLE SWALLOWING: 0
VOMITING: 0
DIARRHEA: 0
NAUSEA: 0
ABDOMINAL PAIN: 0

## 2022-08-08 ENCOUNTER — TELEPHONE (OUTPATIENT)
Dept: CARDIOLOGY CLINIC | Age: 77
End: 2022-08-08

## 2022-08-16 ENCOUNTER — OFFICE VISIT (OUTPATIENT)
Dept: PULMONOLOGY | Age: 77
End: 2022-08-16
Payer: MEDICARE

## 2022-08-16 ENCOUNTER — TELEPHONE (OUTPATIENT)
Dept: PULMONOLOGY | Age: 77
End: 2022-08-16

## 2022-08-16 VITALS
HEIGHT: 70 IN | DIASTOLIC BLOOD PRESSURE: 60 MMHG | TEMPERATURE: 97.3 F | OXYGEN SATURATION: 99 % | SYSTOLIC BLOOD PRESSURE: 90 MMHG | WEIGHT: 156.8 LBS | HEART RATE: 65 BPM | RESPIRATION RATE: 21 BRPM | BODY MASS INDEX: 22.45 KG/M2

## 2022-08-16 DIAGNOSIS — R06.02 SOB (SHORTNESS OF BREATH) ON EXERTION: Primary | ICD-10-CM

## 2022-08-16 PROCEDURE — 99214 OFFICE O/P EST MOD 30 MIN: CPT | Performed by: INTERNAL MEDICINE

## 2022-08-16 PROCEDURE — 1124F ACP DISCUSS-NO DSCNMKR DOCD: CPT | Performed by: INTERNAL MEDICINE

## 2022-08-16 NOTE — TELEPHONE ENCOUNTER
Called to schedule this procedure for the patient, but they won't schedule this without an order first. Please write or print out order. Message routed to Dr. Yan Delacruz.

## 2022-08-17 NOTE — PROGRESS NOTES
Pulmonary Progress note           REASON FOR CONSULTATION:  Chief Complaint   Patient presents with    Follow-up     Trouble breathing         Consult at request of Clotilde Cast MD     PCP: Clotilde Cast MD        Assessment and Plan:   Diagnosis Orders   1. SOB (shortness of breath) on exertion  Full PFT Study With Bronchodilator    CPET/Cardiopulmonary Stress Test Complex          Plan:  Patient still with significant sob with any exertion despite Afib ablation, he is scheduled for echo soon and EP follow up  Will obtain PFT to establish lung function, I doubt significant lung disease, in the mean time he was given Breztri to be used bid, inhaler use was demonstrated. Will schedule the patient for CPET given unexplained dyspnea if his echo and PFT is meryl. HISTORY OF PRESENT ILLNESS: Roxy Terry is very pleasant 65y.o. year old gentleman with medical history stated below significant for former smoker quit 15 years ago, worked in construction, history of bradycardia, status post pacemaker placement 2017, history of paroxysmal atrial fibrillation started recently, underwent left heart cath was negative for any significant coronary disease,, was referred to us for shortness of breath with exertion. Has shortness of breath episodic mainly with exertion associated with chest discomfort, associated sometimes with dizziness especially when he gets up from sitting position. He denies any lower extremity edema, no orthopnea or PND. No chronic cough or sputum production, no wheezing.     He still works in construction  He usually spend 6 months of the winter in [place]  He has a history of calcified granuloma in his chest.    8/16/22:  Here for follow up   S/p Afib/Aflutter ablation and PPM 4/27/22  Currently in paced rhythm  Coming in from the parking lot the patient has significant sob   No wheezing,no cough,no chest congestion     Past Medical History:   Diagnosis Date    Advance directive discussed with patient     DNR - Has Living will    Allergic rhinitis     Arrhythmia     s/p pacemaker for heart block: Dr. Farris Mis 7/31/17    Atrial fibrillation ([facility] [address] 4/10/2022    Benign essential tremor 2/27/2017    Brachial neuritis     Chronic back pain     Erectile dysfunction 2/13/2012    H/O cardiac radiofrequency ablation 4/27/2022 4.2022 A fib flutter    History of tobacco abuse 10/1/2019    > 50 pack year    Hx SBO 4/10/2022    Hypertension 2/13/2012    Parkinson disease ([facility] [address]     Dr. Bryn Mckeon    Symptomatic advanced heart block        Past Surgical History:   Procedure Laterality Date    CERVICAL LAMINECTOMY  1995    ACDF    LUMBAR LAMINECTOMY  1964    with Boswell ankur       family history includes Other in his father; Stroke in his mother. SOCIAL HISTORY:   reports that he quit smoking about 15 years ago. His smoking use included cigarettes. He has quit using smokeless tobacco.      ALLERGIES:  Patient has No Known Allergies. REVIEW OF SYSTEMS:  Constitutional: Negative for fever, no wt loss, no night sweats   HENT: Negative for sore throat, difficulty swallowing,   Eyes: Negative for redness, no discharge   Respiratory: sob with exertion   Cardiovascular: Negative for chest pain, no palpitations   Gastrointestinal: Negative for vomiting, diarrhea   Genitourinary: Negative for hematuria, no dysuria    Musculoskeletal: Negative for arthralgias, no joint swelling   Skin: Negative for rash  LE: no edema   Neurological: Negative for syncope, no tremor, no focal weakness or dysarthria   Hematological: Negative for adenopathy, or bleeding   Psychiatric/Behavorial: Negative for anxiety,    Objective:   PHYSICAL EXAM:  Blood pressure 90/60, pulse 65, temperature 97.3 °F (36.3 °C), resp. rate 21, height 5' 10\" (1.778 m), weight 156 lb 12.8 oz (71.1 kg), SpO2 99 %.'  Gen: No acute distress  Eyes: PERRL.  No sclera icterus. No conjunctival injection. ENT: No discharge. Pharynx clear. External appearance of ears and nose normal.  Neck: Trachea midline. No obvious mass. Resp: No accessory muscle use. No crackles. No wheezes. No rhonchi. CV: Regular rate. Regular rhythm. No murmur or rub. No edema. GI: Non-tender. Non-distended. No hernia. Skin: Warm, dry, normal texture and turgor. No nodule on exposed extremities. Lymph: No cervical LAD. M/S: No cyanosis. No clubbing. No joint deformity. LE:  no edema   Neuro: no tremor, no focal deficit, awake and alert   Psych: intact judgement and insight. Current Outpatient Medications   Medication Sig Dispense Refill    tamsulosin (FLOMAX) 0.4 MG capsule Take 0.4 mg by mouth daily      atorvastatin (LIPITOR) 40 MG tablet Take 1 tablet by mouth nightly 30 tablet 5    apixaban (ELIQUIS) 5 MG TABS tablet Take 1 tablet by mouth 2 times daily 60 tablet 11    carbidopa-levodopa (SINEMET CR)  MG per extended release tablet Take 1 tablet by mouth 3 times daily      isosorbide mononitrate (IMDUR) 30 MG extended release tablet Take 30 mg by mouth in the morning. (Patient not taking: Reported on 8/16/2022) 30 tablet 0     No current facility-administered medications for this visit.        Data Reviewed:   CBC and Renal reviewed  Last CBC  Lab Results   Component Value Date/Time    WBC 5.8 04/21/2022 08:41 AM    RBC 4.32 04/21/2022 08:41 AM    HGB 13.6 04/21/2022 08:41 AM    MCV 94.7 04/21/2022 08:41 AM     04/21/2022 08:41 AM     Last Renal  Lab Results   Component Value Date/Time     04/21/2022 08:41 AM    K 4.3 04/21/2022 08:41 AM    K 4.2 04/12/2022 06:21 AM     04/21/2022 08:41 AM    CO2 24 04/21/2022 08:41 AM    CO2 18 04/12/2022 06:21 AM    CO2 21 04/11/2022 04:15 AM    BUN 21 04/21/2022 08:41 AM    CREATININE 1.3 04/21/2022 08:41 AM    GLUCOSE 78 04/21/2022 08:41 AM    CALCIUM 9.4 04/21/2022 08:41 AM       Last ABG  POC Blood Gas: No results found for: POCPH, POCPCO2, POCPO2, POCHCO3, NBEA, XFME6ION  No results for input(s): PH, PCO2, PO2, HCO3, BE, O2SAT in the last 72 hours. Radiology Review:  Pertinent images / reports were reviewed as a part of this visit. CT Chest w/ contrast: Results for orders placed during the hospital encounter of 06/18/14    CT Chest W Contrast    Narrative  CT CHEST W CONTRAST    History: Lymphadenopathy    Comparison: none    The heart size is normal. There is left base round atelectasis. Benign calcified granulomas present of the right lower lobe. Positional congestion both lung bases typically resolves with  change in patient position. There is a presumed cyst dome of the  liver too small to reliably categorize by CT, image #52. The adrenal glands are normal.    Impression: Round atelectasis suspected left lung base posterior  basilar segment. Continued surveillance is recommended until  complete resolution of findings. No associated parapneumonic  effusion. 1 cm nodule or cyst left lobe of the thyroid gland, best evaluated  with ultrasound and clinical palpation. CT Chest w/o contrast: No results found for this or any previous visit. CTPA: No results found for this or any previous visit. CXR PA/LAT: Results for orders placed during the hospital encounter of 11/29/12    X-ray chest PA and lateral    Narrative  FINDINGS- The cardiomediastinal silhouette and pulmonary  vascularity are normal. The lungs are clear without infiltrates,  nodules or pleural effusions. A benign calcified granuloma is noted at the right lung base  posteriorly. IMPRESSION- NO ACUTE CARDIOPULMONARY ABNORMALITY      Dictated on- 11/29/2012 14-23-24    Electronically Read By- Otto Norris M.D. Electronically Released By- Otto Norris M.D.   Released Date Time- 11/29/12 1426    Cristo Taylor M.D.  ------------------------------------------------------------------------------        Pulmonary

## 2022-08-18 NOTE — TELEPHONE ENCOUNTER
SHAKELE Cabrales scheduling and attempted to schedule this procedure. They are unable to get a hold of the department to scheduled. They will call the office back once they can schedule this procedure.

## 2022-08-18 NOTE — TELEPHONE ENCOUNTER
Keron calls back for scheduling. Patient is scheduled for September 1st 2022 at 9:30 am.   SHAKEEL patient and gave him the information.

## 2022-08-22 ENCOUNTER — TELEPHONE (OUTPATIENT)
Dept: PULMONOLOGY | Age: 77
End: 2022-08-22

## 2022-08-22 ENCOUNTER — HOSPITAL ENCOUNTER (OUTPATIENT)
Dept: PULMONOLOGY | Age: 77
Discharge: HOME | End: 2022-08-22
Payer: MEDICARE

## 2022-08-22 LAB
DLCO %PRED: 73 %
DLCO PRED: NORMAL
DLCO/VA %PRED: NORMAL
DLCO/VA PRED: NORMAL
DLCO/VA: NORMAL
DLCO: NORMAL
EXPIRATORY TIME-POST: NORMAL
EXPIRATORY TIME: NORMAL
FEF 25-75% %CHNG: NORMAL
FEF 25-75% %PRED-POST: NORMAL
FEF 25-75% %PRED-PRE: NORMAL
FEF 25-75% PRED: NORMAL
FEF 25-75%-POST: NORMAL
FEF 25-75%-PRE: NORMAL
FEV1 %PRED-POST: 111 %
FEV1 %PRED-PRE: 113 %
FEV1 PRED: NORMAL
FEV1-POST: NORMAL
FEV1-PRE: NORMAL
FEV1/FVC %PRED-POST: NORMAL
FEV1/FVC %PRED-PRE: NORMAL
FEV1/FVC PRED: NORMAL
FEV1/FVC-POST: 103 %
FEV1/FVC-PRE: 105 %
FVC %PRED-POST: NORMAL
FVC %PRED-PRE: NORMAL
FVC PRED: NORMAL
FVC-POST: NORMAL
FVC-PRE: NORMAL
GAW %PRED: NORMAL
GAW PRED: NORMAL
GAW: NORMAL
IC %PRED: NORMAL
IC PRED: NORMAL
IC: NORMAL
MEP: NORMAL
MIP: NORMAL
MVV %PRED-PRE: NORMAL
MVV PRED: NORMAL
MVV-PRE: NORMAL
PEF %PRED-POST: NORMAL
PEF %PRED-PRE: NORMAL
PEF PRED: NORMAL
PEF%CHNG: NORMAL
PEF-POST: NORMAL
PEF-PRE: NORMAL
RAW %PRED: NORMAL
RAW PRED: NORMAL
RAW: NORMAL
RV %PRED: NORMAL
RV PRED: NORMAL
RV: NORMAL
SVC %PRED: NORMAL
SVC PRED: NORMAL
SVC: NORMAL
TLC %PRED: 98 %
TLC PRED: NORMAL
TLC: NORMAL
VA %PRED: NORMAL
VA PRED: NORMAL
VA: NORMAL
VTG %PRED: NORMAL
VTG PRED: NORMAL
VTG: NORMAL

## 2022-08-22 PROCEDURE — 94729 DIFFUSING CAPACITY: CPT

## 2022-08-22 PROCEDURE — 94640 AIRWAY INHALATION TREATMENT: CPT

## 2022-08-22 PROCEDURE — 94726 PLETHYSMOGRAPHY LUNG VOLUMES: CPT

## 2022-08-22 PROCEDURE — 6370000000 HC RX 637 (ALT 250 FOR IP): Performed by: INTERNAL MEDICINE

## 2022-08-22 PROCEDURE — 94060 EVALUATION OF WHEEZING: CPT

## 2022-08-22 RX ORDER — ALBUTEROL SULFATE 90 UG/1
4 AEROSOL, METERED RESPIRATORY (INHALATION) ONCE
Status: COMPLETED | OUTPATIENT
Start: 2022-08-22 | End: 2022-08-22

## 2022-08-22 RX ADMIN — ALBUTEROL SULFATE 4 PUFF: 90 AEROSOL, METERED RESPIRATORY (INHALATION) at 09:47

## 2022-08-22 ASSESSMENT — PULMONARY FUNCTION TESTS
FEV1/FVC_POST: 103
FEV1_PERCENT_PREDICTED_PRE: 113
FEV1/FVC_PRE: 105
FEV1_PERCENT_PREDICTED_POST: 111

## 2022-08-22 NOTE — TELEPHONE ENCOUNTER
----- Message from Annabella Orona MD sent at 8/22/2022 12:33 PM EDT -----  Pleas let him know that his PFT showed normal lung function let us proceed with cardiopulmonary exercise testing.    ----- Message -----  From: Teressa Charlton RCP  Sent: 8/22/2022  12:09 PM EDT  To: Annabella Orona MD

## 2022-08-22 NOTE — TELEPHONE ENCOUNTER
SHAKEEL patient and gave him his results. He is already scheduled for the cardiopulmonary stress bike test. Informed patient to keep this appointment.

## 2022-08-23 PROCEDURE — 94726 PLETHYSMOGRAPHY LUNG VOLUMES: CPT | Performed by: INTERNAL MEDICINE

## 2022-08-23 PROCEDURE — 94729 DIFFUSING CAPACITY: CPT | Performed by: INTERNAL MEDICINE

## 2022-08-23 PROCEDURE — 94060 EVALUATION OF WHEEZING: CPT | Performed by: INTERNAL MEDICINE

## 2022-08-23 NOTE — PROCEDURES
Spirometry was acceptable and reproducible by ATS standards    Spirometry  Spirometry is normal.    Bronchodilator  There is no response to bronchodilator demonstrated. [Increase in FEV1 and/or FVC => 12% of control and => 200 ml]    Lung Volumes  Lung volumes are normal.    Diffusing Capacity  Diffusing capacity isdecreased. [>60% and <LLN]      Overall Interpretation  Normal spirometry and lung volumes with FEV1 113% without bronchodilator response. Total lung capacity 98%. Isolation reduction in diffusion capacity 73%. Isolated decrease in diffusion capacity can be see with anemia, pulmonary vascular occlusive disease, interstitial lung disease, emphysema and pulmonary edema. Clinical correlation needed.        Pulmonary Function Testing Results:    FEV1 %Pred-Pre   Date Value Ref Range Status   08/22/2022 113 % Final     FEV1 %Pred-Post   Date Value Ref Range Status   08/22/2022 111 % Final     FEV1/FVC-Pre   Date Value Ref Range Status   08/22/2022 105 % Final     FEV1/FVC-Post   Date Value Ref Range Status   08/22/2022 103 % Final     TLC %Pred   Date Value Ref Range Status   08/22/2022 98 % Final     DLCO %Pred   Date Value Ref Range Status   08/22/2022 73 % Final             OBSTRUCTION % Predicted FEV1   MILD >70%   MODERATE 60-69%   MODERATELY-SEVERE 50-59%   SEVERE 35-49%   VERY SEVERE <35%         RESTRICTION % Predicted TLC   MILD Less than LLN but > than 70%   MODERATE 60-69%   MODERATELY-SEVERE <60%                 DIFFUSION CAPACITY DL,CO % Pred   MILD >60% AND < LLN   MODERATE 40-60%   SEVERE <40%       PFT data will be scanned into the procedure tab in epic under this encounter    Sidney Amaya MD  [facility]

## 2022-08-24 NOTE — PROGRESS NOTES
[facility]   Electrophysiology      Date: 8/29/2022    Primary cardiologist: Berlin Daniels MD    Chief Complaint:   Chief Complaint   Patient presents with    Shortness of Breath    Follow-up     1mo/ cc     History of Present Illness:    I saw Fran Terry in the office for electrophysiology follow up today. He is a 65 y.o. male with a past medical history of symptomatic bradycardia s/p dual chamber Biotronik pacemaker implant in July 2017 by Dr. Jam Zhang and Parkinsons disease. He had new atrial fibrillation seen on device interrogation on 12/23/21. He underwent atrial fibrillation (PVI, CAFE), right atrial flutter (CTI) and left atrial flutter (roof line) ablation on 4/27/22 with Dr. Capri Jewell. Flecainide and Toprol were stopped once he was 3 months out from ablation. He presents today for device interrogation after stopping medications. He continues to have dyspnea on exertion which is unchanged compared to previous. Denies any palpitations or chest pain. No edema. He will get lightheaded if he stands too quickly but denies any syncope. He has an upcoming CPET later this week. Allergies:  No Known Allergies    Home Medications:  Prior to Visit Medications    Medication Sig Taking?  Authorizing Provider   tamsulosin (FLOMAX) 0.4 MG capsule Take 0.4 mg by mouth daily Yes Historical Provider, MD   atorvastatin (LIPITOR) 40 MG tablet Take 1 tablet by mouth nightly Yes Marquis Zaman MD   apixaban (ELIQUIS) 5 MG TABS tablet Take 1 tablet by mouth 2 times daily Yes Bennie Yun MD   carbidopa-levodopa (SINEMET CR)  MG per extended release tablet Take 1 tablet by mouth 3 times daily Yes Historical Provider, MD        Past Medical History:  Past Medical History:   Diagnosis Date    Advance directive discussed with patient     DNR - Has Living will    Allergic rhinitis     Arrhythmia     s/p pacemaker for heart block: Dr. Jam Zhang 7/31/17    Atrial fibrillation ([facility] [address] 4/10/2022    Benign essential tremor 2017    Brachial neuritis     Chronic back pain     Erectile dysfunction 2012    H/O cardiac radiofrequency ablation 2022 A fib flutter    History of tobacco abuse 10/1/2019    > 50 pack year    Hx SBO 4/10/2022    Hypertension 2012    Parkinson disease ([facility] [address]     Dr. Maria Eugenia Rubio    Symptomatic advanced heart block        Past Surgical History:   Past Surgical History:   Procedure Laterality Date    CERVICAL LAMINECTOMY      ACDF    LUMBAR LAMINECTOMY      with Boswell ankur       Social History:   reports that he quit smoking about 15 years ago. His smoking use included cigarettes. He has quit using smokeless tobacco. He reports current alcohol use of about 15.0 standard drinks per week. He reports that he does not use drugs. Family History:      Problem Relation Age of Onset    Stroke Mother          80    Other Father          80 Viral Meningitis       Review of Systems   Constitutional:  Negative for chills, fatigue, fever and unexpected weight change. HENT:  Negative for congestion, hearing loss, sinus pressure, sore throat and trouble swallowing. Respiratory:  Positive for shortness of breath (PRECIADO). Negative for cough and wheezing. Cardiovascular:  Negative for chest pain, palpitations and leg swelling. Gastrointestinal:  Negative for abdominal pain, blood in stool, constipation, diarrhea, nausea and vomiting. Genitourinary:  Negative for hematuria. Musculoskeletal:  Negative for arthralgias, back pain, gait problem and myalgias. Skin:  Negative for color change, rash and wound. Neurological:  Positive for light-headedness. Negative for dizziness, seizures, syncope, speech difficulty and weakness. Hematological:  Does not bruise/bleed easily.       Physical Examination:  Vitals:    22 1519   BP: 105/70   Pulse: 50   SpO2: 99%      Wt Readings from Last 3 Encounters:   22 155 lb (70.3 kg)   22 156 lb 12.8 oz (71.1 kg) 07/28/22 157 lb (71.2 kg)       Physical Exam  Vitals reviewed. Constitutional:       General: He is not in acute distress. Appearance: Normal appearance. HENT:      Head: Normocephalic and atraumatic. Nose: Nose normal.      Mouth/Throat:      Mouth: Mucous membranes are moist.   Eyes:      Conjunctiva/sclera: Conjunctivae normal.      Pupils: Pupils are equal, round, and reactive to light. Cardiovascular:      Rate and Rhythm: Normal rate and regular rhythm. Heart sounds: No murmur heard. No friction rub. No gallop. Pulmonary:      Effort: No respiratory distress. Breath sounds: No wheezing, rhonchi or rales. Abdominal:      General: Abdomen is flat. Bowel sounds are normal.      Palpations: Abdomen is soft. Musculoskeletal:         General: Normal range of motion. Right lower leg: No edema. Left lower leg: No edema. Skin:     General: Skin is warm and dry. Findings: No bruising. Neurological:      General: No focal deficit present. Mental Status: He is alert and oriented to person, place, and time. Motor: No weakness. Psychiatric:         Mood and Affect: Mood normal.         Behavior: Behavior normal.        Pertinent labs, diagnostic, device, and imaging results reviewed as a part of this visit    LABS    CBC:   Lab Results   Component Value Date    WBC 5.8 04/21/2022    HGB 13.6 04/21/2022    HCT 40.9 04/21/2022    MCV 94.7 04/21/2022     04/21/2022     BMP:   Lab Results   Component Value Date    CREATININE 1.3 04/21/2022    BUN 21 (H) 04/21/2022     04/21/2022    K 4.3 04/21/2022     04/21/2022    CO2 24 04/21/2022     Estimated Creatinine Clearance: 47 mL/min (based on SCr of 1.3 mg/dL).    No results found for: BNP    Thyroid:   Lab Results   Component Value Date    TSH 2.41 10/05/2015     Lipid Panel:   Lab Results   Component Value Date/Time    CHOL 102 04/21/2022 08:41 AM    HDL 40 04/21/2022 08:41 AM    HDL 48 2012 07:15 AM    TRIG 52 2022 08:41 AM     LFTs:  Lab Results   Component Value Date    ALT <5 (L) 04/10/2022    AST 28 04/10/2022    ALKPHOS 32 (L) 04/10/2022    BILITOT 0.7 04/10/2022     Coags:   Lab Results   Component Value Date    PROTIME 11.1 2017    INR 0.98 2017       EC22  A-paced with PVCs at 70 BPM.    Echo: 22   Summary   Normal left ventricle size, wall thickness, and systolic function with an   estimated ejection fraction of 55-60%. Diastolic filling parameters suggest grade I diastolic dysfunction. Abnormal (paradoxical) septal motion is present likely due to right   ventricular pacing. Mild mitral regurgitation is present. The right ventricle is normal in size and function. TAPSE is 2.8 cm. Pacer / ICD wire is visualized in the right ventricle. Stress test: 3/8/18      Conclusions          Summary     Normal myocardial perfusion study. Normal myocardial perfusion. Normal LV size and systolic function. Overall findings represent a low risk study. Cath: 22  1. Left main normal gives rise to LAD and left circumflex. 2. Medium size vessel that wraps around the apex gives rise to medium size   diagonal branch. There is mild disease proximal LAD. LAD continues to   wraparound apex. 3. Left circumflex large dominant vessel gives rise to 2 small OM 1 and 2   branches, medium size OM3 branch and left PDA. No stenosis present. 4. RCA codominant. Has mild nonobstructive disease proximally. LV filling pressure. LVEDP is 8 mmHg   No AS   No complications. EP Procedures:  1. Dual chamber Biotronik pacemaker implant in 17 by Dr. Juan Shaw  2. Atrial fibrillation (PVI, CAFE), right atrial flutter (CTI) and left atrial flutter (roof line) ablation on 22, Dr. Birmingham March interrogation: 2022   Normal device function. Battery life 60%  A paced 57%  V paced 80%  No episodes.     Assessment & Plan:    Paroxysmal atrial fibrillation   - first seen on device interrogation in December 2021   - s/p RFA and CAFE RFA on 4/27/22 with Dr. Alma Soto 3 (age, CAD) on Eliquis 5mg BID   - device interrogation today with no arrhythmias    Atrial flutter   - both R and L flutter seen on EP study s/p ablation on 4/27/22   - see above    Symptomatic bradycardia   - with premature junction beats, PVCs and blocked PACs   - s/p dual chamber Biotronik PPM implant on 8/7/17   - device interrogation as above, continue with routine follow up with device clinic   - had previous syncope when mode changed to VVI at 27 - currently programmed to DDDR    CAD   - \"mild nonobstructive\" disease noted on LHC in January 2022   - on statin   - saw general cardiology in [place] and was told he didn't need to follow up    PRECIADO   - persistent since even before ablation   - echo with normal EF, no significant valvular abnormalities   - can't blame arrhtyhmias as he has not had any since prior to ablation   - recent LHC with only mild nonobstructive disease   - has upcoming CPET   - he wonders if it may be from Sinemet? Has f/u with neurology in October when back in [place]      Thank you for allowing to us to participate in the care of [facility] Day. Return in about 1 year (around 8/29/2023) for an appointment with Bonner Hodgkins, NP.      Bonner Hodgkins, APRN  The [address], [address] [place] [address]  Phone: [phone]  Fax: [phone]    Electronically signed by MAL Mojica - CNP on 8/29/2022 at 4:14 PM

## 2022-08-25 ENCOUNTER — HOSPITAL ENCOUNTER (OUTPATIENT)
Dept: CARDIOLOGY | Age: 77
Discharge: HOME | End: 2022-08-25
Payer: MEDICARE

## 2022-08-25 DIAGNOSIS — R06.09 DOE (DYSPNEA ON EXERTION): ICD-10-CM

## 2022-08-25 LAB
LV EF: 58 %
LVEF MODALITY: NORMAL

## 2022-08-25 PROCEDURE — 93306 TTE W/DOPPLER COMPLETE: CPT

## 2022-08-29 ENCOUNTER — OFFICE VISIT (OUTPATIENT)
Dept: CARDIOLOGY CLINIC | Age: 77
End: 2022-08-29
Payer: MEDICARE

## 2022-08-29 ENCOUNTER — NURSE ONLY (OUTPATIENT)
Dept: CARDIOLOGY CLINIC | Age: 77
End: 2022-08-29

## 2022-08-29 VITALS
DIASTOLIC BLOOD PRESSURE: 70 MMHG | OXYGEN SATURATION: 99 % | BODY MASS INDEX: 22.19 KG/M2 | HEART RATE: 50 BPM | SYSTOLIC BLOOD PRESSURE: 105 MMHG | WEIGHT: 155 LBS | HEIGHT: 70 IN

## 2022-08-29 DIAGNOSIS — R06.09 DOE (DYSPNEA ON EXERTION): ICD-10-CM

## 2022-08-29 DIAGNOSIS — I48.0 PAROXYSMAL ATRIAL FIBRILLATION (HCC): Primary | ICD-10-CM

## 2022-08-29 DIAGNOSIS — I48.92 LEFT ATRIAL FLUTTER BY ELECTROCARDIOGRAM (HCC): ICD-10-CM

## 2022-08-29 DIAGNOSIS — R00.1 SYMPTOMATIC BRADYCARDIA: ICD-10-CM

## 2022-08-29 DIAGNOSIS — I25.10 CORONARY ARTERY DISEASE INVOLVING NATIVE CORONARY ARTERY OF NATIVE HEART WITHOUT ANGINA PECTORIS: ICD-10-CM

## 2022-08-29 DIAGNOSIS — I48.92 RIGHT ATRIAL FLUTTER BY ELECTROCARDIOGRAM (HCC): ICD-10-CM

## 2022-08-29 PROCEDURE — 1124F ACP DISCUSS-NO DSCNMKR DOCD: CPT | Performed by: NURSE PRACTITIONER

## 2022-08-29 PROCEDURE — 93000 ELECTROCARDIOGRAM COMPLETE: CPT | Performed by: NURSE PRACTITIONER

## 2022-08-29 PROCEDURE — 99214 OFFICE O/P EST MOD 30 MIN: CPT | Performed by: NURSE PRACTITIONER

## 2022-08-29 ASSESSMENT — ENCOUNTER SYMPTOMS
COUGH: 0
COLOR CHANGE: 0
BLOOD IN STOOL: 0
SORE THROAT: 0
ABDOMINAL PAIN: 0
CONSTIPATION: 0
BACK PAIN: 0
WHEEZING: 0
TROUBLE SWALLOWING: 0
SHORTNESS OF BREATH: 1
DIARRHEA: 0
VOMITING: 0
NAUSEA: 0
SINUS PRESSURE: 0

## 2022-08-29 NOTE — LETTER
[facility]   Electrophysiology        Date: 8/29/2022     Primary cardiologist: Madelyn Darby MD     Chief Complaint:        Chief Complaint   Patient presents with    Shortness of Breath    Follow-up       1mo/ cc      History of Present Illness:     I saw Juliette Terry in the office for electrophysiology follow up today. He is a 65 y.o. male with a past medical history of symptomatic bradycardia s/p dual chamber Biotronik pacemaker implant in July 2017 by Dr. Gallo Sousa and Parkinsons disease. He had new atrial fibrillation seen on device interrogation on 12/23/21. He underwent atrial fibrillation (PVI, CAFE), right atrial flutter (CTI) and left atrial flutter (roof line) ablation on 4/27/22 with Dr. Mone Flower. Flecainide and Toprol were stopped once he was 3 months out from ablation. He presents today for device interrogation after stopping medications. He continues to have dyspnea on exertion which is unchanged compared to previous. Denies any palpitations or chest pain. No edema. He will get lightheaded if he stands too quickly but denies any syncope. He has an upcoming CPET later this week.         Allergies:  No Known Allergies     Home Medications:        Prior to Visit Medications    Medication Sig Taking?  Authorizing Provider   tamsulosin (FLOMAX) 0.4 MG capsule Take 0.4 mg by mouth daily Yes Historical Provider, MD   atorvastatin (LIPITOR) 40 MG tablet Take 1 tablet by mouth nightly Yes Onesimo Maravilla MD   apixaban (ELIQUIS) 5 MG TABS tablet Take 1 tablet by mouth 2 times daily Yes Bia Gonzalez MD   carbidopa-levodopa (SINEMET CR)  MG per extended release tablet Take 1 tablet by mouth 3 times daily Yes Historical Provider, MD         Past Medical History:  Past Medical History        Past Medical History:   Diagnosis Date    Advance directive discussed with patient       DNR - Has Living will    Allergic rhinitis      Arrhythmia       s/p pacemaker for heart block: Dr. Gallo Sousa 7/31/17    Atrial 08/29/22 1519   BP: 105/70   Pulse: 50   SpO2: 99%          Wt Readings from Last 3 Encounters:   08/29/22 155 lb (70.3 kg)   08/16/22 156 lb 12.8 oz (71.1 kg)   07/28/22 157 lb (71.2 kg)         Physical Exam  Vitals reviewed. Constitutional:       General: He is not in acute distress. Appearance: Normal appearance. HENT:      Head: Normocephalic and atraumatic. Nose: Nose normal.      Mouth/Throat:      Mouth: Mucous membranes are moist.   Eyes:      Conjunctiva/sclera: Conjunctivae normal.      Pupils: Pupils are equal, round, and reactive to light. Cardiovascular:      Rate and Rhythm: Normal rate and regular rhythm. Heart sounds: No murmur heard. No friction rub. No gallop. Pulmonary:      Effort: No respiratory distress. Breath sounds: No wheezing, rhonchi or rales. Abdominal:      General: Abdomen is flat. Bowel sounds are normal.      Palpations: Abdomen is soft. Musculoskeletal:         General: Normal range of motion. Right lower leg: No edema. Left lower leg: No edema. Skin:     General: Skin is warm and dry. Findings: No bruising. Neurological:      General: No focal deficit present. Mental Status: He is alert and oriented to person, place, and time. Motor: No weakness. Psychiatric:         Mood and Affect: Mood normal.         Behavior: Behavior normal.         Pertinent labs, diagnostic, device, and imaging results reviewed as a part of this visit     LABS     CBC:         Lab Results   Component Value Date     WBC 5.8 04/21/2022     HGB 13.6 04/21/2022     HCT 40.9 04/21/2022     MCV 94.7 04/21/2022      04/21/2022      BMP:         Lab Results   Component Value Date     CREATININE 1.3 04/21/2022     BUN 21 (H) 04/21/2022      04/21/2022     K 4.3 04/21/2022      04/21/2022     CO2 24 04/21/2022      Estimated Creatinine Clearance: 47 mL/min (based on SCr of 1.3 mg/dL).    No results found for: BNP     Thyroid:   Lab Results   Component Value Date     TSH 2.41 10/05/2015      Lipid Panel:         Lab Results   Component Value Date/Time     CHOL 102 2022 08:41 AM     HDL 40 2022 08:41 AM     HDL 48 2012 07:15 AM     TRIG 52 2022 08:41 AM      LFTs:        Lab Results   Component Value Date     ALT <5 (L) 04/10/2022     AST 28 04/10/2022     ALKPHOS 32 (L) 04/10/2022     BILITOT 0.7 04/10/2022      Coags:         Lab Results   Component Value Date     PROTIME 11.1 2017     INR 0.98 2017         EC22  A-paced with PVCs at 70 BPM.     Echo: 22   Summary   Normal left ventricle size, wall thickness, and systolic function with an   estimated ejection fraction of 55-60%.  Diastolic filling parameters suggest grade I diastolic dysfunction.   Abnormal (paradoxical) septal motion is present likely due to right   ventricular pacing.   Mild mitral regurgitation is present.   The right ventricle is normal in size and function. TAPSE is 2.8 cm.   Pacer / ICD wire is visualized in the right ventricle.     Stress test: 3/8/18       Conclusions          Summary     Normal myocardial perfusion study.     Normal myocardial perfusion.     Normal LV size and systolic function.     Overall findings represent a low risk study.       Cath: 22  1. Left main normal gives rise to LAD and left circumflex. 2. Medium size vessel that wraps around the apex gives rise to medium size   diagonal branch.  There is mild disease proximal LAD.  LAD continues to   wraparound apex. 3. Left circumflex large dominant vessel gives rise to 2 small OM 1 and 2   branches, medium size OM3 branch and left PDA.  No stenosis present. 4. RCA codominant.  Has mild nonobstructive disease proximally. LV filling pressure.  LVEDP is 8 mmHg   No AS   No complications.      EP Procedures:  1. Dual chamber Biotronik pacemaker implant in 17 by Dr. Daron Srinivasan  2.  Atrial fibrillation (PVI, CAFE), right atrial flutter (CTI) and left atrial flutter (roof line) ablation on 4/27/22, Dr. Akira Brown interrogation: 8/29/2022   Normal device function. Battery life 60%  A paced 57%  V paced 80%  No episodes.     Assessment & Plan:     Paroxysmal atrial fibrillation              - first seen on device interrogation in December 2021              - s/p RFA and CAFE RFA on 4/27/22 with Dr. Kendra Still 3 (age, CAD) on Eliquis 5mg BID              - device interrogation today with no arrhythmias     Atrial flutter              - both R and L flutter seen on EP study s/p ablation on 4/27/22              - see above     Symptomatic bradycardia              - with premature junction beats, PVCs and blocked PACs              - s/p dual chamber Biotronik PPM implant on 8/7/17              - device interrogation as above, continue with routine follow up with device clinic              - had previous syncope when mode changed to VVI at 27 - currently programmed to DDDR     CAD              - \"mild nonobstructive\" disease noted on LHC in January 2022              - on statin              - saw general cardiology in [place] and was told he didn't need to follow up     PRECIADO              - persistent since even before ablation              - echo with normal EF, no significant valvular abnormalities              - can't blame arrhtyhmias as he has not had any since prior to ablation              - recent LHC with only mild nonobstructive disease              - has upcoming CPET              - he wonders if it may be from Sinemet?  Has f/u with neurology in October when back in [place]        Thank you for allowing to us to participate in the care of Sascha Terry.     Return in about 1 year (around 8/29/2023) for an appointment with Matilde Weaver NP.   Emelia Tan 56, APRN  The [address], [address] [place] [address]  Phone: [phone]  Fax:     [phone]     Electronically signed by MAL Agarwal CNP on 8/29/2022 at 4:14 PM

## 2022-09-07 RX ORDER — ATORVASTATIN CALCIUM 40 MG/1
TABLET, FILM COATED ORAL
Qty: 90 TABLET | Refills: 3 | Status: SHIPPED | OUTPATIENT
Start: 2022-09-07

## 2022-09-08 ENCOUNTER — TELEPHONE (OUTPATIENT)
Dept: PULMONOLOGY | Age: 77
End: 2022-09-08

## 2022-09-09 NOTE — TELEPHONE ENCOUNTER
Patient missed call , and is requesting a call back TODAY. Does not want to wait until Monday.  Dr. Rio De please call

## 2022-09-09 NOTE — TELEPHONE ENCOUNTER
Result report is in [facility] 9/1/2022    Interpretation:  Moderately reduced exercise limitation with only at 50 % maximal exercise  capacity achieved. suggestive of  Maximal work load of AT (anaerobic threshold) < 13 %  possible   muscle conditioning  Correlate Clinically  2. Cardiac parameters overall within normal limits. Low normal HR predicted  with possible use of beta blockers and normal VO2 pulse. Normal BP response. Correlate Clinically   3. Respiratory parameters without any evidence of obstruction and no  significant decline in FEV1 with exercise. Elevated V Co 2 suggestive of  possible dead space ventilation/ co 2 retention. normal A- a gradient post  exercise.  correlate clinically  Electronically Signed On 9-7-2022 4:35:39 EDT by Shelby Prince MD     Miscellaneous Results - PULM/CARDIO EXERCISE TEST (09/01/2022 9:59 AM EDT)  Authorizing Provider Result Type   Martinez Melendez MD [address] [place] [address]

## 2022-10-11 ENCOUNTER — OFFICE VISIT (OUTPATIENT)
Dept: FAMILY MEDICINE CLINIC | Age: 77
End: 2022-10-11
Payer: MEDICARE

## 2022-10-11 VITALS
DIASTOLIC BLOOD PRESSURE: 60 MMHG | WEIGHT: 159.4 LBS | HEIGHT: 71 IN | SYSTOLIC BLOOD PRESSURE: 130 MMHG | BODY MASS INDEX: 22.31 KG/M2 | OXYGEN SATURATION: 100 % | HEART RATE: 69 BPM

## 2022-10-11 DIAGNOSIS — J30.9 ALLERGIC RHINITIS, UNSPECIFIED SEASONALITY, UNSPECIFIED TRIGGER: ICD-10-CM

## 2022-10-11 DIAGNOSIS — Z87.891 HISTORY OF TOBACCO ABUSE: ICD-10-CM

## 2022-10-11 DIAGNOSIS — N40.1 BENIGN PROSTATIC HYPERPLASIA WITH NOCTURIA: ICD-10-CM

## 2022-10-11 DIAGNOSIS — Z98.890 H/O CARDIAC RADIOFREQUENCY ABLATION: ICD-10-CM

## 2022-10-11 DIAGNOSIS — Z95.0 PACEMAKER: ICD-10-CM

## 2022-10-11 DIAGNOSIS — R35.1 BENIGN PROSTATIC HYPERPLASIA WITH NOCTURIA: ICD-10-CM

## 2022-10-11 DIAGNOSIS — G25.0 BENIGN ESSENTIAL TREMOR: ICD-10-CM

## 2022-10-11 DIAGNOSIS — R16.0 LIVER MASS: Primary | ICD-10-CM

## 2022-10-11 DIAGNOSIS — Z23 NEEDS FLU SHOT: ICD-10-CM

## 2022-10-11 DIAGNOSIS — G20 PARKINSON DISEASE (HCC): ICD-10-CM

## 2022-10-11 DIAGNOSIS — L57.0 ACTINIC KERATOSES: ICD-10-CM

## 2022-10-11 DIAGNOSIS — Z87.19 HX SBO: ICD-10-CM

## 2022-10-11 DIAGNOSIS — I95.1 ORTHOSTATIC HYPOTENSION: ICD-10-CM

## 2022-10-11 DIAGNOSIS — R06.09 DOE (DYSPNEA ON EXERTION): ICD-10-CM

## 2022-10-11 PROBLEM — I48.91 ATRIAL FIBRILLATION (HCC): Status: RESOLVED | Noted: 2022-04-10 | Resolved: 2022-10-11

## 2022-10-11 PROBLEM — I48.92 RIGHT ATRIAL FLUTTER BY ELECTROCARDIOGRAM (HCC): Status: RESOLVED | Noted: 2022-04-27 | Resolved: 2022-10-11

## 2022-10-11 PROBLEM — I48.92 LEFT ATRIAL FLUTTER BY ELECTROCARDIOGRAM (HCC): Status: RESOLVED | Noted: 2022-04-27 | Resolved: 2022-10-11

## 2022-10-11 PROCEDURE — 99214 OFFICE O/P EST MOD 30 MIN: CPT | Performed by: INTERNAL MEDICINE

## 2022-10-11 PROCEDURE — 1124F ACP DISCUSS-NO DSCNMKR DOCD: CPT | Performed by: INTERNAL MEDICINE

## 2022-10-11 PROCEDURE — G0008 ADMIN INFLUENZA VIRUS VAC: HCPCS | Performed by: INTERNAL MEDICINE

## 2022-10-11 PROCEDURE — 90694 VACC AIIV4 NO PRSRV 0.5ML IM: CPT | Performed by: INTERNAL MEDICINE

## 2022-10-11 NOTE — PROGRESS NOTES
HPI: Micheal Part Day presents for vaccine. Health issues include osteoarthritis, tobacco history, tremor question Parkinson's, pacemaker, paroxysmal atrial fibrillation, transient small bowel obstruction    Chronic rhinitis, postnasal drip. States medications make him too sleepy. Never used any nasal inhalers. Has had evaluation with normal pulmonary function test cardiopulmonary exercise testing and cardiac evaluation all which have been unremarkable. Feels like shortness of breath occurred after having COVID-vaccine. States he walks and then stops for a few minutes and starts back up no chest pain or palpitations noted at that time. Hospitalization 4/10/2022 for small bowel obstruction, hypertension, atrial fibrillation. No surgery needed. CT abdomen with indeterminate right hepatic lesion 2.3 cm in MRI follow-up recommended. This was discussed again. Dermatology actinic keratosis. Does not use sunscreen. Has appointment later today     Onset of intermittent atrial fibrillation 2021  Underwent cardiac catheterization with mild coronary disease and ejection fraction of 63%. Carotid ultrasounds were negative. Darted on Eliquis and atorvastatin. Ablation 4/27/2022. flecainide and Toprol discontinued and interrogation August 2022 without arrhythmia. Echocardiogram August 2022 EF 55 to 74% grade 1 diastolic dysfunction paradoxical septal motion mild MR normal RV orthostatic hypotension. [address] cardiology appointment August 2022 has a Biotronik PPM implant since 2017 when he suffered from bradycardia. Dyspnea on exertion not felt to be secondary to cardiac disease. Has seen pulmonary. Normal PFTs. Recommended cardiopulmonary exercise testing. History of recurrent back surgeries film with large anterior osteophytes in the L3 4 level and L2-3. History of L4 5 fusion. Notes over the last year and a half he has had intermittent pain in the right flank. States no change.       He has a 50 year history of smoking. CT of the chest 2019 with stable pulm nodules and left lung base atelectasis. inceidental findings of stable thyroid cyst DJD liver hemangiomas, diverticulosis and atherosclerosis aorta without aneurysm. History tremor. States this is better with Sinemet. States he has Parkinson's. Follows with neurologist in [place]     Parkinsonism treated with Sinemet     Prostatism with tamsulosin continues to have frequent urination. Is following up with urologist.     Incidental finding of a liver mass on CT in April. Is aware of this not interested in following up with MRI at this time. States he has had 3 COVID vaccines. Specialist    Neurology     ortho     Cardiology     PMH:   Pacemaker, back surgery cervical laminectomy, cardiac catheterization, small bowel obstruction        Social history:  50-pack-year tobacco history . Marijuana 3 times a week. No exercise outside of work . [place] in the winter. [address]. Has a girlfriend. .  Family history CVA     Review of systems back pain, sun damaged skin, no sunscreen. Shortness of breath going up steps. Asymptomatic palpitations. No evidence of recurrent A. fib. No bowel obstruction resolved. . Rare GE reflux. No bloody stools. Polyuria. Arthralgias. Denies any cognitive use. No wheezing shortness of breath GE reflux. Has hard stools. Frequent urination and some dribbling.   Syncope with Flomax    Constitutional, ent, CV, respiratory, GI, , joint, skin, allergic and psychiatric ROS reviewed and negative except for above    No Known Allergies    Outpatient Medications Marked as Taking for the 10/11/22 encounter (Office Visit) with Kadeem Morales MD   Medication Sig Dispense Refill    atorvastatin (LIPITOR) 40 MG tablet TAKE ONE TABLET BY MOUTH ONCE NIGHTLY 90 tablet 3    apixaban (ELIQUIS) 5 MG TABS tablet Take 1 tablet by mouth 2 times daily 60 tablet 11    carbidopa-levodopa (SINEMET CR)  MG per extended release tablet Take 1 tablet by mouth 3 times daily               Past Medical History:   Diagnosis Date    Advance directive discussed with patient     DNR - Has Living will    Allergic rhinitis     Arrhythmia     s/p pacemaker for heart block: Dr. Tarv Daniel 17    Atrial fibrillation ([facility] 4/10/2022    Benign essential tremor 2017    Brachial neuritis     Chronic back pain     Erectile dysfunction 2012    H/O cardiac radiofrequency ablation 2022 A fib flutter    History of tobacco abuse 10/1/2019    > 50 pack year    Hx SBO 4/10/2022    Hypertension 2012    Parkinson disease ([facility]     Dr. Chelsi Mariano    Symptomatic advanced heart block        Past Surgical History:   Procedure Laterality Date    CERVICAL LAMINECTOMY      ACDF    LUMBAR LAMINECTOMY  1964    with Boswell ankur             Family History   Problem Relation Age of Onset    Stroke Mother          80    Other Father          80 Viral Meningitis                 Objective     /60   Pulse 69   Ht 5' 11\" (1.803 m)   Wt 159 lb 6.4 oz (72.3 kg)   SpO2 100%   BMI 22.23 kg/m²     @LASTSAO2(3)@    Wt Readings from Last 3 Encounters:   22 155 lb (70.3 kg)   22 156 lb 12.8 oz (71.1 kg)   22 157 lb (71.2 kg)       Physical Exam     NAD alert and cooperative  HEENT: Throat is clear. TMs unremarkable. Hard of hearing. No bruits. Lungs clear no wheezes rales or rhonchi. Cardiovascular exam distant occasional ectopic beat. Rate controlled. Abdomen is benign no hepatosplenomegaly epigastric tenderness or masses. I detect no ascites. No icterus. Diminished pulses feet no peripheral edema. No masked facies. Deliberate gait. He is appropriate. Well-groomed.   Good eye contact    Chemistry        Component Value Date/Time     2022 0841    K 4.3 2022 0841    K 4.2 2022 0621     2022 0841    CO2 24 2022 0841    BUN 21 (H) 2022 3754 CREATININE 1.3 04/21/2022 0841        Component Value Date/Time    CALCIUM 9.4 04/21/2022 0841    ALKPHOS 32 (L) 04/10/2022 0947    AST 28 04/10/2022 0947    ALT <5 (L) 04/10/2022 0947    BILITOT 0.7 04/10/2022 0947            Lab Results   Component Value Date    WBC 5.8 04/21/2022    HGB 13.6 04/21/2022    HCT 40.9 04/21/2022    MCV 94.7 04/21/2022     04/21/2022     Lab Results   Component Value Date    LABA1C 5.4 05/28/2019     No results found for: EAG  Lab Results   Component Value Date    LABA1C 5.4 05/28/2019     No components found for: CHLPL  Lab Results   Component Value Date    TRIG 52 04/21/2022    TRIG 73 10/01/2019    TRIG 131 11/14/2017     Lab Results   Component Value Date    HDL 40 04/21/2022    HDL 71 (H) 10/01/2019    HDL 63 (H) 11/14/2017     Lab Results   Component Value Date    LDLCALC 52 04/21/2022    LDLCALC 103 (H) 10/01/2019    LDLCALC 112 (H) 11/14/2017     Lab Results   Component Value Date    LABVLDL 10 04/21/2022    LABVLDL 15 10/01/2019    LABVLDL 26 11/14/2017       Old labs and records reviewed or requested  Discussed past lab and studies with patient      Diagnosis Orders   1. Liver mass        2. Benign prostatic hyperplasia with nocturia        3. History of tobacco abuse        4. Parkinson disease ([facility]        5. Pacemaker        6. Benign essential tremor        7. Actinic keratoses        8. H/O cardiac radiofrequency ablation        9. Hx SBO        10. Orthostatic hypotension        11. Needs flu shot  Influenza, FLUAD, (age 72 y+), IM, Preservative Free, 0.5 mL      12. PRECIADO (dyspnea on exertion)        13. Allergic rhinitis, unspecified seasonality, unspecified trigger          Liver mass. Recommended having an MRI. States he will address this when he gets back from [place]. No icterus or symptoms. BPH. Discontinued medication secondary to syncope. Not interested in intervention. History of tobacco abuse no currently. Follows with pulmonary.   No worrisome lung lesions. Tremor and questionable Parkinson disease being treated with Sinemet. History atrial fibrillation with ablation. Pacemaker. No recurrent symptoms. Eliquis. No active bleeding. Actinic keratoses dermatology appointment today. Advised on sunscreen which he declines. History of small bowel obstruction no recurrence. Orthostatic hypotension improved. Rhinitis given information on nasal sprays. Shortness of breath has had extensive evaluation with pulmonary, cardio, and functional testing which have been unremarkable. States he is not going to get any COVID vaccines this is like this is what maintenance have shortness of breath with exertion. Declined shingles vaccine has flu vaccine today. On this date   I have spent 30 minutes reviewing previous notes, test results, and face to face with the patient discussing the diagnosis and plan          No follow-ups on file. Diagnosis and treatment discussed.   Possible side effects of medication reviewed  Patients questions answered  Follow up understood  Pt aware if they are not contacted about any test results , this does not mean they are normal.  They should call

## 2022-10-11 NOTE — PATIENT INSTRUCTIONS
If interested in nasal spray can use flonase, astelin, or atrovent nasal spray for congestion and throat symptoms  you can get flonase and astelin without a prescription  Continue your exercise  I recommend getting a liver MRI for the mass they found in April  Continue your current medication  Cosider shingles vaccine  Call to schedule with new pcp so they can see you when you return from [place]  I recommend sunscreen    The best to you    MD Joi Blair MD  [facility] internal [address] [facility] [address]  [address] [facility]  [phone]

## 2022-10-28 ENCOUNTER — TELEPHONE (OUTPATIENT)
Dept: FAMILY MEDICINE CLINIC | Age: 77
End: 2022-10-28

## 2022-10-28 NOTE — TELEPHONE ENCOUNTER
Patient states he is in [place] and would like to go ahead and schedule an appointment with who Dr Magdiel Hartley referred him too. No referrals are in the system. Please advise.

## 2022-10-28 NOTE — TELEPHONE ENCOUNTER
Patient advised to call for new pcp. Gave information on [facility].

## 2022-10-28 NOTE — TELEPHONE ENCOUNTER
----- Message from Desiree Shane sent at 10/28/2022  8:36 AM EDT -----  Subject: Message to Provider    QUESTIONS  Information for Provider? Pt stated he was given referrals for new   providers due to Edinson Savage retiring and one for his fatty liver. Pt   is now in [place] for the next six months and would like the names of   those doctors mailed to his home address so he can start setting up appts   while he is in [place]. ---------------------------------------------------------------------------  --------------  Ninfa CARTER  [phone]; OK to leave message on voicemail  ---------------------------------------------------------------------------  --------------  SCRIPT ANSWERS  Relationship to Patient?  Self

## 2022-10-28 NOTE — TELEPHONE ENCOUNTER
I do not see any referrals in chart or mentioned in a note    He will need to establish with new PCP due to Maude Meléndez retiring, I can see him if he can come in for an appt in the next 30 days, if not he will need to establish at another office--- [facility] has many openings

## 2022-11-07 ENCOUNTER — NURSE ONLY (OUTPATIENT)
Dept: CARDIOLOGY CLINIC | Age: 77
End: 2022-11-07
Payer: MEDICARE

## 2022-11-07 DIAGNOSIS — Z95.0 PACEMAKER: Primary | ICD-10-CM

## 2022-11-07 DIAGNOSIS — I44.1 SYMPTOMATIC ADVANCED HEART BLOCK: ICD-10-CM

## 2022-11-07 PROCEDURE — 93296 REM INTERROG EVL PM/IDS: CPT | Performed by: INTERNAL MEDICINE

## 2022-11-07 PROCEDURE — 93294 REM INTERROG EVL PM/LDLS PM: CPT | Performed by: INTERNAL MEDICINE

## 2022-12-21 NOTE — PROGRESS NOTES
Remote transmission received from patient's dual chamber pacemaker monitor at home. Transmission shows normal sensing and pacing function. No new arrhythmias/events recorded. Ap 58%   78%  PVCs 192/hr (visible on Periodic IEGM)  Echo 08.25.22 showed EF of 55-60%. TI is normal/stable. End of 91-day monitoring period 11/7/22. EP physician will review. See interrogation under cardiology tab in the [address] [place] [address] [address] field for more details. Will continue to monitor remotely.

## 2023-02-22 ENCOUNTER — NURSE ONLY (OUTPATIENT)
Dept: CARDIOLOGY CLINIC | Age: 78
End: 2023-02-22
Payer: MEDICARE

## 2023-02-22 DIAGNOSIS — Z95.0 PACEMAKER: Primary | ICD-10-CM

## 2023-02-22 DIAGNOSIS — I44.1 SYMPTOMATIC ADVANCED HEART BLOCK: ICD-10-CM

## 2023-02-22 PROCEDURE — 93294 REM INTERROG EVL PM/LDLS PM: CPT | Performed by: INTERNAL MEDICINE

## 2023-02-22 PROCEDURE — 93296 REM INTERROG EVL PM/IDS: CPT | Performed by: INTERNAL MEDICINE

## 2023-02-23 NOTE — PROGRESS NOTES
Remote transmission received from patient's dual chamber pacemaker monitor at home. Transmission shows normal sensing and pacing function. No new arrhythmias/events recorded. Ap 60%   81%  PVCs 160/hr   Echo 08.25.22 showed EF of 55-60%. TI is normal/stable. EP physician will review. See interrogation under cardiology tab in the [address] [place] [address] [address] field for more details. Will continue to monitor remotely. (End of 91-day monitoring period 2/22/23).

## 2023-04-03 ENCOUNTER — OFFICE VISIT (OUTPATIENT)
Dept: CARDIOLOGY CLINIC | Age: 78
End: 2023-04-03
Payer: MEDICARE

## 2023-04-03 ENCOUNTER — NURSE ONLY (OUTPATIENT)
Dept: CARDIOLOGY CLINIC | Age: 78
End: 2023-04-03
Payer: MEDICARE

## 2023-04-03 VITALS
DIASTOLIC BLOOD PRESSURE: 68 MMHG | HEART RATE: 73 BPM | HEIGHT: 71 IN | OXYGEN SATURATION: 97 % | WEIGHT: 167 LBS | SYSTOLIC BLOOD PRESSURE: 106 MMHG | BODY MASS INDEX: 23.38 KG/M2

## 2023-04-03 DIAGNOSIS — I48.0 PAROXYSMAL ATRIAL FIBRILLATION (HCC): Primary | ICD-10-CM

## 2023-04-03 DIAGNOSIS — I48.4 ATYPICAL ATRIAL FLUTTER (HCC): ICD-10-CM

## 2023-04-03 DIAGNOSIS — Z95.0 PACEMAKER: ICD-10-CM

## 2023-04-03 DIAGNOSIS — G20 PARKINSON DISEASE (HCC): ICD-10-CM

## 2023-04-03 DIAGNOSIS — I44.1 SYMPTOMATIC ADVANCED HEART BLOCK: ICD-10-CM

## 2023-04-03 DIAGNOSIS — Z95.0 PACEMAKER: Primary | ICD-10-CM

## 2023-04-03 DIAGNOSIS — R06.09 DOE (DYSPNEA ON EXERTION): ICD-10-CM

## 2023-04-03 DIAGNOSIS — I25.10 CORONARY ARTERY DISEASE INVOLVING NATIVE CORONARY ARTERY OF NATIVE HEART WITHOUT ANGINA PECTORIS: ICD-10-CM

## 2023-04-03 PROCEDURE — 93280 PM DEVICE PROGR EVAL DUAL: CPT | Performed by: INTERNAL MEDICINE

## 2023-04-03 PROCEDURE — 99214 OFFICE O/P EST MOD 30 MIN: CPT | Performed by: INTERNAL MEDICINE

## 2023-04-03 PROCEDURE — 1124F ACP DISCUSS-NO DSCNMKR DOCD: CPT | Performed by: INTERNAL MEDICINE

## 2023-04-03 NOTE — PROGRESS NOTES
Patient comes in for programming evaluation on their Biotronik 052232 [address] DR-T ProMRI DC PPM.    Last remote on 4/3/23    All sensing and pacing parameters are within normal range. Battery life 4y 4m  P- ApVp @ 60bpm  U- appears to be SSS  AP 60%.  81%. No episodes noted. Patient remains on Eliquis  No changes need to be made at this time. Please see interrogation for more detail. TI is within normal range. Patient will see Ml Jackson today and follow up in 3 months in office or remotely.

## 2023-04-03 NOTE — PROGRESS NOTES
Cardiac Electrophysiology Consultation   Date: 4/3/2023  Reason for Consultation: Device Management/ atrial fibrillation / atrial flutter  Consult Requesting Physician: Gerson Escalante MD  Primary Care Physician: Frances Balderas MD    Chief Complaint:   Chief Complaint   Patient presents with    Follow-up     PT concerned of PRECIADO. HPI: Gloria Terry is a 65 y.o. patient with a history of HTN who was initially seen in consultation on 10/31/16 for bradycardia. He had a long standing history of asymptomatic bradycardia that was not limiting his activity. It was decided to defer a PPM and montor-- however, one year later he reported progressive symptoms and decided to undergo Biotronik 2-chamber PPM implant 7/2017. He was seen in office on 9/30/2019  for management of his device. He reported he is feeling well with the current setting of his pacemaker at DDDR. He stated that when his device was changed to VVI rate of 30 bpm he became lightheaded and passed out, thereby requiring a re-programming of the device to the current setting of DDDR. New onset atrial fibrillationfirst seen on device interrogation 12/23/2021. He was seen in office on 03/08/2022. He reported the he underwent LHC in [place] in January 2022. Ablation was discussed and he decided to proceed. On 04/27/2022 he underwent radiofrequency ablation of atrial fibrillation, left atrial flutter and pulmonary vein isolation. Started on Flecainide but at a very low dose of 25mg po BID &Toprol XL 12.5mg daily. Propanolol was discontinued. He was seen in office on 07/28/2022 by MAL Parada CNP and reported PRECIADO and frustration that the ablation did not fix it. He reported and episode of lightheadedness while standing and a syncopal episode end of May 2022. Echo was ordered. No correlation with arrhthymias on pacemaker interrogation. Flecainide and Toprol XL were stopped. Echo completed on 08/25/2022 showed LVEF 55-60 %.    He was

## 2023-04-04 ENCOUNTER — TELEPHONE (OUTPATIENT)
Dept: CARDIOLOGY CLINIC | Age: 78
End: 2023-04-04

## 2023-04-04 NOTE — TELEPHONE ENCOUNTER
Spoke to pt and he states his SOB is a lot better but would like it to be at least 20-30% more better. Pt requesting adjustments to further decrease SOB. I can bring pt in and make adjustments to rate response if Dr Zeeshan Tony believes pt will benefit from this.

## 2023-04-04 NOTE — TELEPHONE ENCOUNTER
Frankie Anthony is calling stating he saw Deb Jiang yesterday 4/3/22 with a device check and they \"upped\" the pacemaker and states he feels great and would like to bump it up a little bit more. Is free to come in tomorrow.     rFankie Anthony can be reached at  [phone]

## 2023-04-17 ENCOUNTER — TELEPHONE (OUTPATIENT)
Dept: CARDIOLOGY CLINIC | Age: 78
End: 2023-04-17

## 2023-04-17 ENCOUNTER — TELEPHONE (OUTPATIENT)
Dept: INTERNAL MEDICINE CLINIC | Age: 78
End: 2023-04-17

## 2023-04-17 DIAGNOSIS — G20 PARKINSON DISEASE (HCC): Primary | ICD-10-CM

## 2023-04-17 NOTE — TELEPHONE ENCOUNTER
Pt called to talk to [facility] about his device.       #[phone]

## 2023-04-17 NOTE — TELEPHONE ENCOUNTER
----- Message from Delmar Higuera sent at 4/14/2023  8:51 AM EDT -----  Subject: Referral Request    Reason for referral request? Pt is requesting a referral for a neurologist   specialist to be seen for his Parkinsons and memory test. Pt would like to   go to location for Dr. Shailesh Ashby and office number is [phone]. Please contact pt when this is approved and ready for scheduling. Provider patient wants to be referred to(if known):     Provider Phone Number(if known):     Additional Information for Provider?   ---------------------------------------------------------------------------  --------------  [address] [employer]    [phone]; OK to leave message on voicemail  ---------------------------------------------------------------------------  --------------

## 2023-04-17 NOTE — TELEPHONE ENCOUNTER
----- Message from Dedra Lopez sent at 4/14/2023  8:51 AM EDT -----  Subject: Message to Provider    QUESTIONS  Information for Provider? pt would like to know if Dr. See Cannon looked at   pt's chart and seen that he has a fatty liver and would like to know what   is going to be done about it. please contact pt regarding this message. ---------------------------------------------------------------------------  --------------  Dede AMES  [phone]; OK to leave message on voicemail  ---------------------------------------------------------------------------  --------------  SCRIPT ANSWERS  Relationship to Patient?  Self

## 2023-04-20 ENCOUNTER — NURSE ONLY (OUTPATIENT)
Dept: CARDIOLOGY CLINIC | Age: 78
End: 2023-04-20

## 2023-04-20 DIAGNOSIS — I44.1 SYMPTOMATIC ADVANCED HEART BLOCK: ICD-10-CM

## 2023-04-20 DIAGNOSIS — Z95.0 PACEMAKER: Primary | ICD-10-CM

## 2023-04-20 NOTE — PROGRESS NOTES
Pt came in for an adjustment on rate response, says last adjustment made no improvements. I spoke with a [employer] rep and they suggested to turn off DDDR and turn on DDD CLS. Rep stated this programming is more sensitive and auto adjusts better to the body. Device programmed to DDD CLS. Pt will call on Monday with update.

## 2023-04-27 ENCOUNTER — NURSE ONLY (OUTPATIENT)
Dept: CARDIOLOGY CLINIC | Age: 78
End: 2023-04-27

## 2023-04-27 DIAGNOSIS — Z95.0 PACEMAKER: Primary | ICD-10-CM

## 2023-04-27 DIAGNOSIS — I44.1 SYMPTOMATIC ADVANCED HEART BLOCK: ICD-10-CM

## 2023-04-27 DIAGNOSIS — I48.0 PAROXYSMAL ATRIAL FIBRILLATION (HCC): ICD-10-CM

## 2023-04-27 DIAGNOSIS — I48.4 ATYPICAL ATRIAL FLUTTER (HCC): ICD-10-CM

## 2023-05-01 ENCOUNTER — TELEPHONE (OUTPATIENT)
Dept: CARDIOLOGY CLINIC | Age: 78
End: 2023-05-01

## 2023-05-01 NOTE — PROGRESS NOTES
[address]   Electrophysiology      Date: 5/2/2023    Primary cardiologist: Elham Leone MD    Chief Complaint:   Chief Complaint   Patient presents with    Follow-up     PT concerned with PRECIADO. History of Present Illness:    I saw Delana Denver Day in the office for electrophysiology follow up today. He is a 65 y.o. male with a past medical history of symptomatic bradycardia s/p dual chamber Biotronik pacemaker implant in July 2017 by Dr. Lillian Cunningham and Parkinsons disease. He had new atrial fibrillation seen on device interrogation on 12/23/21. He underwent atrial fibrillation (PVI, CAFE), right atrial flutter (CTI) and left atrial flutter (roof line) ablation on 4/27/22 with Dr. Stefani Scott. He has continued to have dyspnea on exertion over the last 16 months which have been about the same. This occurs somewhat intermittently, and when he was down in [place] he was able to walk in the [facility] but when the temperature increase he knows he was way more short of breath and needed to stop more frequently to rest.  He was getting lightheaded while he was really short of breath. He has difficulty walking and talking at the same time or sometimes eating multiple bites of food in a row without breathing. He has had extensive work-up without any clear etiology. We have adjusted multiple things on his device including making his rate response more sensitive and increasing his upper tracking rate. He feels that maybe he is a little worse after increasing his upper tracking rate as it takes him longer to recover. Denies any chest pain or palpitations. No syncope. No edema. Allergies:  No Known Allergies    Home Medications:  Prior to Visit Medications    Medication Sig Taking?  Authorizing Provider   atorvastatin (LIPITOR) 40 MG tablet TAKE ONE TABLET BY MOUTH ONCE NIGHTLY Yes Zeke Cobian MD   apixaban (ELIQUIS) 5 MG TABS tablet Take 1 tablet by mouth 2 times daily Yes Grayson Gautam MD   carbidopa-levodopa

## 2023-05-01 NOTE — TELEPHONE ENCOUNTER
Glenroy Francisco called in for [place], [place]. He stated that there was no change after she adjusted his device. [facility] MA mentioned if he wanted to see a provider about his SOB, that he can make an appointment. Please advise if there is availability. Glenroy Francisco mentioned that he will be going out of town on Thursday.       Jesus's callback: [phone]

## 2023-05-01 NOTE — TELEPHONE ENCOUNTER
Please schedule sooner appointment with [facility] -  [facility]

## 2023-05-02 ENCOUNTER — NURSE ONLY (OUTPATIENT)
Dept: CARDIOLOGY CLINIC | Age: 78
End: 2023-05-02

## 2023-05-02 ENCOUNTER — OFFICE VISIT (OUTPATIENT)
Dept: CARDIOLOGY CLINIC | Age: 78
End: 2023-05-02
Payer: MEDICARE

## 2023-05-02 VITALS
DIASTOLIC BLOOD PRESSURE: 88 MMHG | WEIGHT: 166 LBS | OXYGEN SATURATION: 98 % | BODY MASS INDEX: 23.24 KG/M2 | SYSTOLIC BLOOD PRESSURE: 134 MMHG | HEART RATE: 71 BPM | HEIGHT: 71 IN

## 2023-05-02 DIAGNOSIS — I48.0 PAROXYSMAL ATRIAL FIBRILLATION (HCC): Primary | ICD-10-CM

## 2023-05-02 DIAGNOSIS — Z95.0 PACEMAKER: Primary | ICD-10-CM

## 2023-05-02 DIAGNOSIS — R06.09 DOE (DYSPNEA ON EXERTION): ICD-10-CM

## 2023-05-02 DIAGNOSIS — Z95.0 PACEMAKER: ICD-10-CM

## 2023-05-02 DIAGNOSIS — I44.1 SYMPTOMATIC ADVANCED HEART BLOCK: ICD-10-CM

## 2023-05-02 DIAGNOSIS — I25.10 CORONARY ARTERY DISEASE INVOLVING NATIVE CORONARY ARTERY OF NATIVE HEART WITHOUT ANGINA PECTORIS: ICD-10-CM

## 2023-05-02 DIAGNOSIS — I48.92 LEFT ATRIAL FLUTTER BY ELECTROCARDIOGRAM (HCC): ICD-10-CM

## 2023-05-02 PROCEDURE — 99213 OFFICE O/P EST LOW 20 MIN: CPT | Performed by: NURSE PRACTITIONER

## 2023-05-02 PROCEDURE — 1124F ACP DISCUSS-NO DSCNMKR DOCD: CPT | Performed by: NURSE PRACTITIONER

## 2023-05-02 PROCEDURE — 93000 ELECTROCARDIOGRAM COMPLETE: CPT | Performed by: NURSE PRACTITIONER

## 2023-05-02 ASSESSMENT — ENCOUNTER SYMPTOMS
DIARRHEA: 0
NAUSEA: 0
BLOOD IN STOOL: 0
COUGH: 0
WHEEZING: 0
BACK PAIN: 0
ABDOMINAL PAIN: 0
COLOR CHANGE: 0
SORE THROAT: 0
SINUS PRESSURE: 0
CONSTIPATION: 0
SHORTNESS OF BREATH: 1
VOMITING: 0
TROUBLE SWALLOWING: 0

## 2023-05-02 NOTE — PROGRESS NOTES
Seen today by NPCP, pt c/o worsening SOB since last device change.  Moved upper tracking rate back to 130bpm from 150bpm.

## 2023-05-15 NOTE — TELEPHONE ENCOUNTER
Last OV: 5/2/23  Next OV: 8/29/23  Last refill: 3/28/22  #60  11 R/F  Most recent Labs: 4/21/22  Last EKG (if needed): 5/2/23

## 2023-05-15 NOTE — TELEPHONE ENCOUNTER
Medication Refill    Medication needing refilled:  apixaban (ELIQUIS)     Dosage of the medication:  5 MG TABS tablet     How are you taking this medication (QD, BID, TID, QID, PRN):  Take 1 tablet by mouth 2 times daily    30 or 90 day supply called in: 30 day supply    When will you run out of your medication:   3 days left    Which Pharmacy are we sending the medication to?:  [facility] [id number] [facility], [address] [place] [address] Road [phone] - f [phone]

## 2023-05-23 ENCOUNTER — OFFICE VISIT (OUTPATIENT)
Dept: INTERNAL MEDICINE CLINIC | Age: 78
End: 2023-05-23
Payer: MEDICARE

## 2023-05-23 DIAGNOSIS — Z00.00 INITIAL MEDICARE ANNUAL WELLNESS VISIT: Primary | ICD-10-CM

## 2023-05-23 PROCEDURE — 1124F ACP DISCUSS-NO DSCNMKR DOCD: CPT | Performed by: STUDENT IN AN ORGANIZED HEALTH CARE EDUCATION/TRAINING PROGRAM

## 2023-05-23 PROCEDURE — G0438 PPPS, INITIAL VISIT: HCPCS | Performed by: STUDENT IN AN ORGANIZED HEALTH CARE EDUCATION/TRAINING PROGRAM

## 2023-05-23 ASSESSMENT — LIFESTYLE VARIABLES
HOW OFTEN DO YOU HAVE A DRINK CONTAINING ALCOHOL: 2-3 TIMES A WEEK
HAVE YOU OR SOMEONE ELSE BEEN INJURED AS A RESULT OF YOUR DRINKING: 0
HOW OFTEN DURING THE LAST YEAR HAVE YOU NEEDED AN ALCOHOLIC DRINK FIRST THING IN THE MORNING TO GET YOURSELF GOING AFTER A NIGHT OF HEAVY DRINKING: 0
HOW MANY STANDARD DRINKS CONTAINING ALCOHOL DO YOU HAVE ON A TYPICAL DAY: 3 OR 4
HOW OFTEN DURING THE LAST YEAR HAVE YOU HAD A FEELING OF GUILT OR REMORSE AFTER DRINKING: 0
HOW OFTEN DURING THE LAST YEAR HAVE YOU BEEN UNABLE TO REMEMBER WHAT HAPPENED THE NIGHT BEFORE BECAUSE YOU HAD BEEN DRINKING: 0
HOW OFTEN DURING THE LAST YEAR HAVE YOU FOUND THAT YOU WERE NOT ABLE TO STOP DRINKING ONCE YOU HAD STARTED: 0
HOW OFTEN DURING THE LAST YEAR HAVE YOU FAILED TO DO WHAT WAS NORMALLY EXPECTED FROM YOU BECAUSE OF DRINKING: 0

## 2023-05-23 ASSESSMENT — PATIENT HEALTH QUESTIONNAIRE - PHQ9
SUM OF ALL RESPONSES TO PHQ QUESTIONS 1-9: 0
1. LITTLE INTEREST OR PLEASURE IN DOING THINGS: 0
SUM OF ALL RESPONSES TO PHQ QUESTIONS 1-9: 0
SUM OF ALL RESPONSES TO PHQ QUESTIONS 1-9: 0
2. FEELING DOWN, DEPRESSED OR HOPELESS: 0
SUM OF ALL RESPONSES TO PHQ9 QUESTIONS 1 & 2: 0
SUM OF ALL RESPONSES TO PHQ QUESTIONS 1-9: 0

## 2023-05-23 NOTE — PROGRESS NOTES
Medicare Annual Wellness Visit    Francisco Terry is here for No chief complaint on file. Assessment & Plan   Initial Medicare annual wellness visit      Recommendations for Preventive Services Due: see orders and patient instructions/AVS.  Recommended screening schedule for the next 5-10 years is provided to the patient in written form: see Patient Instructions/AVS.     Return in 1 year (on 5/23/2024). Subjective       Patient's complete Health Risk Assessment and screening values have been reviewed and are found in Flowsheets. The following problems were reviewed today and where indicated follow up appointments were made and/or referrals ordered. Positive Risk Factor Screenings with Interventions:    Fall Risk:  Do you feel unsteady or are you worried about falling? : (!) yes  2 or more falls in past year?: no  Fall with injury in past year?: no     Interventions:    Patient comments: light headed due to trouble breathing. Self-assessment of health: In general, how would you say your health is?: (!) Poor    Interventions:  Patient advised to follow-up in this office for further evaluation and treatment         Vision Screen:  Do you have difficulty driving, watching TV, or doing any of your daily activities because of your eyesight?: No  Have you had an eye exam within the past year?: (!) No  No results found. Interventions:   Patient encouraged to make appointment with their eye specialist    Safety:  Do you have either shower bars, grab bars, non-slip mats or non-slip surfaces in your shower or bathtub?: (!) No  Interventions:  Patient comments: safety measures discussed. Objective   There were no vitals filed for this visit. There is no height or weight on file to calculate BMI. No Known Allergies  Prior to Visit Medications    Medication Sig Taking?  Authorizing Provider   apixaban (ELIQUIS) 5 MG TABS tablet Take 1 tablet by mouth 2 times daily  [place]

## 2023-06-07 ENCOUNTER — NURSE ONLY (OUTPATIENT)
Dept: CARDIOLOGY CLINIC | Age: 78
End: 2023-06-07
Payer: MEDICARE

## 2023-06-07 DIAGNOSIS — Z95.0 PACEMAKER: Primary | ICD-10-CM

## 2023-06-07 DIAGNOSIS — I45.9 HB (HEART BLOCK): ICD-10-CM

## 2023-06-07 PROCEDURE — 93296 REM INTERROG EVL PM/IDS: CPT | Performed by: INTERNAL MEDICINE

## 2023-06-07 PROCEDURE — 93294 REM INTERROG EVL PM/LDLS PM: CPT | Performed by: INTERNAL MEDICINE

## 2023-08-01 ENCOUNTER — TELEPHONE (OUTPATIENT)
Dept: CARDIOLOGY CLINIC | Age: 78
End: 2023-08-01

## 2023-08-01 NOTE — TELEPHONE ENCOUNTER
Lele Mason called to request a letter to get his 's license renewed.     Jesus's callback: [phone]

## 2023-08-02 NOTE — TELEPHONE ENCOUNTER
Dr. 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 We issued a letter last August 2022. Patient was last seen in office on 05/02/2023 by MAL Mar CNP. History of SSS s/p PPM, atrial fibrillation, typical atrial flutter s/p RFA atrial fibrillation (PVI, CAFE), right atrial flutter (CTI) and left atrial flutter (roof line) ablation on 4/27/2022. 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     Thanks,  Carli Chinchilla RN

## 2023-08-25 NOTE — PROGRESS NOTES
[facility]   Electrophysiology      Date: 8/29/2023    Primary cardiologist: Ashvin Camp MD [facility]    Chief Complaint:   Chief Complaint   Patient presents with    Follow-up     A fib, SSS     History of Present Illness:    I saw Carlee Terry in the office for electrophysiology follow up today. He is a 77 y.o. male with a past medical history of symptomatic bradycardia s/p dual chamber Biotronik pacemaker implant in July 2017 by Dr. Galvan and Parkinsons disease. He had new atrial fibrillation seen on device interrogation on 12/23/21. He underwent atrial fibrillation (PVI, CAFE), right atrial flutter (CTI) and left atrial flutter (roof line) ablation on 4/27/22 with Dr. Glenna Wan. He presents today for follow up. He continues to have dyspnea on exertion which has been stable over the last couple years. He recently saw a cardiologist at [facility] who did a CT scan to rule out pulmonary vein stenosis which it did. There was no obvious cardiac etiology to his dyspnea on exertion. He denies any chest pain or palpitations. No syncope. He does get lightheaded if he stands too quickly. Denies any edema. No bleeding problems. Allergies:  No Known Allergies    Home Medications:  Prior to Visit Medications    Medication Sig Taking?  Authorizing Provider   carbidopa-levodopa (SINEMET)  MG per tablet Take 1 tablet by mouth 4 times daily Yes Historical Provider, MD   apixaban (ELIQUIS) 5 MG TABS tablet Take 1 tablet by mouth 2 times daily Yes Helena Rosa MD   atorvastatin (LIPITOR) 40 MG tablet TAKE ONE TABLET BY MOUTH ONCE NIGHTLY Yes Mariluz Sutton MD   carbidopa-levodopa (SINEMET CR)  MG per extended release tablet Take 1 tablet by mouth 5 (five) times a day Yes Historical Provider, MD        Past Medical History:  Past Medical History:   Diagnosis Date    Advance directive discussed with patient     DNR - Has Living will    Allergic rhinitis     Arrhythmia     s/p pacemaker for heart

## 2023-08-29 ENCOUNTER — OFFICE VISIT (OUTPATIENT)
Dept: CARDIOLOGY CLINIC | Age: 78
End: 2023-08-29
Payer: MEDICARE

## 2023-08-29 ENCOUNTER — NURSE ONLY (OUTPATIENT)
Dept: CARDIOLOGY CLINIC | Age: 78
End: 2023-08-29

## 2023-08-29 VITALS
DIASTOLIC BLOOD PRESSURE: 50 MMHG | OXYGEN SATURATION: 97 % | HEART RATE: 63 BPM | BODY MASS INDEX: 22.94 KG/M2 | WEIGHT: 160.2 LBS | HEIGHT: 70 IN | SYSTOLIC BLOOD PRESSURE: 100 MMHG

## 2023-08-29 DIAGNOSIS — I25.10 CORONARY ARTERY DISEASE INVOLVING NATIVE CORONARY ARTERY OF NATIVE HEART WITHOUT ANGINA PECTORIS: ICD-10-CM

## 2023-08-29 DIAGNOSIS — I48.0 PAF (PAROXYSMAL ATRIAL FIBRILLATION) (HCC): ICD-10-CM

## 2023-08-29 DIAGNOSIS — I49.5 SSS (SICK SINUS SYNDROME) (HCC): ICD-10-CM

## 2023-08-29 DIAGNOSIS — I48.92 RIGHT ATRIAL FLUTTER BY ELECTROCARDIOGRAM (HCC): ICD-10-CM

## 2023-08-29 DIAGNOSIS — I48.0 PAROXYSMAL ATRIAL FIBRILLATION (HCC): Primary | ICD-10-CM

## 2023-08-29 DIAGNOSIS — I48.3 TYPICAL ATRIAL FLUTTER (HCC): ICD-10-CM

## 2023-08-29 DIAGNOSIS — I48.92 LEFT ATRIAL FLUTTER BY ELECTROCARDIOGRAM (HCC): ICD-10-CM

## 2023-08-29 DIAGNOSIS — Z95.0 CARDIAC PACEMAKER IN SITU: Primary | ICD-10-CM

## 2023-08-29 DIAGNOSIS — I44.1 SYMPTOMATIC ADVANCED HEART BLOCK: ICD-10-CM

## 2023-08-29 DIAGNOSIS — Z95.0 PACEMAKER: ICD-10-CM

## 2023-08-29 DIAGNOSIS — I45.9 HB (HEART BLOCK): ICD-10-CM

## 2023-08-29 DIAGNOSIS — I48.0 PAROXYSMAL ATRIAL FIBRILLATION (HCC): ICD-10-CM

## 2023-08-29 DIAGNOSIS — R00.1 SYMPTOMATIC BRADYCARDIA: ICD-10-CM

## 2023-08-29 PROCEDURE — 93000 ELECTROCARDIOGRAM COMPLETE: CPT | Performed by: NURSE PRACTITIONER

## 2023-08-29 PROCEDURE — 1124F ACP DISCUSS-NO DSCNMKR DOCD: CPT | Performed by: NURSE PRACTITIONER

## 2023-08-29 PROCEDURE — 99214 OFFICE O/P EST MOD 30 MIN: CPT | Performed by: NURSE PRACTITIONER

## 2023-08-29 ASSESSMENT — ENCOUNTER SYMPTOMS
COLOR CHANGE: 0
TROUBLE SWALLOWING: 0
BLOOD IN STOOL: 0
SORE THROAT: 0
COUGH: 0
ABDOMINAL PAIN: 0
SINUS PRESSURE: 0
NAUSEA: 0
DIARRHEA: 0
CONSTIPATION: 0
BACK PAIN: 0
SHORTNESS OF BREATH: 1
WHEEZING: 0
VOMITING: 0

## 2023-09-05 PROCEDURE — 93296 REM INTERROG EVL PM/IDS: CPT | Performed by: INTERNAL MEDICINE

## 2023-09-05 PROCEDURE — 93294 REM INTERROG EVL PM/LDLS PM: CPT | Performed by: INTERNAL MEDICINE

## 2023-11-03 ENCOUNTER — TELEPHONE (OUTPATIENT)
Dept: CARDIOLOGY CLINIC | Age: 78
End: 2023-11-03

## 2023-11-03 RX ORDER — ATORVASTATIN CALCIUM 40 MG/1
40 TABLET, FILM COATED ORAL NIGHTLY
Qty: 90 TABLET | Refills: 3 | Status: SHIPPED | OUTPATIENT
Start: 2023-11-03

## 2023-11-03 NOTE — TELEPHONE ENCOUNTER
Dr. Glynn,    Patient physician Dr. Swenson that he sees for treatment of his parkinson wants him to start a new medication Rivastigmine.    Please advise.      Thanks,  Joanne Valentino RN

## 2023-11-03 NOTE — TELEPHONE ENCOUNTER
Last OV: 8/29/23  Next OV: X due yearly  Last refill: 9/7/22  #90  3 R/F  Most recent Labs: 7/20/23  Last EKG (if needed): 8/29/23

## 2023-11-03 NOTE — TELEPHONE ENCOUNTER
Andrew called in this afternoon, he states his parkinson doctor (Dr. Lam) wants him to start taking a new medication called Rivastigmine, he would like to know if that will be fine.      He can be reached at [phone].

## 2023-11-03 NOTE — TELEPHONE ENCOUNTER
Medication Refill    Medication needing refilled:  atorvastatin (LIPITOR)     Dosage of the medication:  40 MG tablet     How are you taking this medication (QD, BID, TID, QID, PRN):  TAKE ONE TABLET BY MOUTH ONCE NIGHTLY     30 or 90 day supply called in:   90 tablet     Which Pharmacy are we sending the medication to?:    [address] [facility] [place] [facility].   [place], [place]  [phone]

## 2023-11-06 NOTE — TELEPHONE ENCOUNTER
Called spoke with patient gave Dr Glynn message on starting the Rivastigmine medication. States he will be cautious and give us a call if feels any afib or concerns.

## 2023-12-25 PROCEDURE — 93294 REM INTERROG EVL PM/LDLS PM: CPT | Performed by: INTERNAL MEDICINE

## 2023-12-25 PROCEDURE — 93296 REM INTERROG EVL PM/IDS: CPT | Performed by: INTERNAL MEDICINE

## 2024-01-08 ENCOUNTER — TELEPHONE (OUTPATIENT)
Dept: INTERNAL MEDICINE CLINIC | Age: 79
End: 2024-01-08

## 2024-01-08 NOTE — TELEPHONE ENCOUNTER
----- Message from Andrew Terry sent at 1/5/2024 11:07 AM EST -----  Regarding: Handicap medical form  Contact: [phone]  I called 12/21 to request medical form for handicap sticker.   Just checking to see if it has been filled out and mailed to me in [place], [place]  Andrew Terry     1945   [phone]  [address] [place] [address]    Thank you, Jesus Terry

## 2024-01-12 ENCOUNTER — TELEPHONE (OUTPATIENT)
Dept: CARDIOLOGY CLINIC | Age: 79
End: 2024-01-12

## 2024-01-12 NOTE — TELEPHONE ENCOUNTER
Andrew called in this morning, he states his pacemaker machine-biotronic use to say ok on it, he states it is now flashing with a line through it, he states he unplugged it to see if that line would go away and its still there. He is not sure if he should be concerned.      He can be reached at [phone].

## 2024-01-12 NOTE — TELEPHONE ENCOUNTER
LM for pt to call [employer] at 1 [phone], HM appears to be working from site. [facility] will be able to diagnose any issues.

## 2024-03-27 ENCOUNTER — PATIENT MESSAGE (OUTPATIENT)
Dept: PULMONOLOGY | Age: 79
End: 2024-03-27

## 2024-03-27 NOTE — TELEPHONE ENCOUNTER
From: Andrew Terry  To: Dr. Martinez Davidson  Sent: 3/27/2024 9:13 AM EDT  Subject: Shortness of Breath    Dr. Lin, I have an appointment with you on May 6th, I am still having shortness of breath which no one can fix. Saw an ad for Camzyos medicine which states it would help with this condition? Also says this is a result of heart muscle thickening. Your thoughts on if this could help.    Thanks, Jesus Terry  1945  [phone]

## 2024-05-06 ENCOUNTER — OFFICE VISIT (OUTPATIENT)
Dept: PULMONOLOGY | Age: 79
End: 2024-05-06
Payer: MEDICARE

## 2024-05-06 VITALS
OXYGEN SATURATION: 98 % | WEIGHT: 162.4 LBS | SYSTOLIC BLOOD PRESSURE: 122 MMHG | RESPIRATION RATE: 18 BRPM | HEIGHT: 70 IN | HEART RATE: 65 BPM | BODY MASS INDEX: 23.25 KG/M2 | DIASTOLIC BLOOD PRESSURE: 74 MMHG | TEMPERATURE: 96.7 F

## 2024-05-06 DIAGNOSIS — R06.02 SOB (SHORTNESS OF BREATH) ON EXERTION: Primary | ICD-10-CM

## 2024-05-06 PROCEDURE — 99214 OFFICE O/P EST MOD 30 MIN: CPT | Performed by: INTERNAL MEDICINE

## 2024-05-06 PROCEDURE — 1124F ACP DISCUSS-NO DSCNMKR DOCD: CPT | Performed by: INTERNAL MEDICINE

## 2024-05-06 NOTE — PROGRESS NOTES
Pulmonary Progress note           REASON FOR CONSULTATION:  Chief Complaint   Patient presents with    Follow-up    Shortness of Breath        Consult at request of Gloria Sheehan MD     PCP: Gloria Sheehan MD        Assessment and Plan:   Diagnosis Orders   1. SOB (shortness of breath) on exertion              Plan:  Patient still with significant sob PFT normal, cardiopulmonary exercise test with muscle deconditioning, echocardiogram with normal ejection fraction and grade 1 diastolic dysfunction  Advised to gradually increase his exertion  Advised to follow-up with cardiology for his chest pain.    HISTORY OF PRESENT ILLNESS: Andrew Terry is very pleasant 78 y.o. year old gentleman with medical history stated below significant for former smoker quit 15 years ago, worked in construction, history of bradycardia, status post pacemaker placement 2017, history of paroxysmal atrial fibrillation started recently, underwent left heart cath was negative for any significant coronary disease,, was referred to us for shortness of breath with exertion.  Has shortness of breath episodic mainly with exertion associated with chest discomfort, associated sometimes with dizziness especially when he gets up from sitting position.  He denies any lower extremity edema, no orthopnea or PND.  No chronic cough or sputum production, no wheezing.    He still works in construction  He usually spend 6 months of the winter in [place]  He has a history of calcified granuloma in his chest.      S/p Afib/Aflutter ablation and PPM 4/27/22  Underwent cardiopulmonary exercise test that showed only muscular deconditioning no cardiopulmonary restriction  Still has significant shortness of breath with exertion and some chest pain.    Past Medical History:   Diagnosis Date    Advance directive discussed with patient     DNR - Has Living will

## 2024-05-14 ENCOUNTER — TELEPHONE (OUTPATIENT)
Dept: CARDIOLOGY CLINIC | Age: 79
End: 2024-05-14

## 2024-05-14 NOTE — TELEPHONE ENCOUNTER
Medication Refill    Medication needing refilled:  apixaban (ELIQUIS)     Dosage of the medication:  5 MG TABS tablet     How are you taking this medication (QD, BID, TID, QID, PRN):   Take 1 tablet by mouth 2 times daily     30 or 90 day supply called in:   180 tablet     Which Pharmacy are we sending the medication to?:    [id number] - [place] - [address] - P [phone] - F [phone]  [address] [place] [address]  Phone: [phone]  Fax: [phone]                  
No

## 2024-05-28 ENCOUNTER — OFFICE VISIT (OUTPATIENT)
Dept: INTERNAL MEDICINE CLINIC | Age: 79
End: 2024-05-28
Payer: MEDICARE

## 2024-05-28 DIAGNOSIS — Z00.00 MEDICARE ANNUAL WELLNESS VISIT, SUBSEQUENT: Primary | ICD-10-CM

## 2024-05-28 PROCEDURE — 1124F ACP DISCUSS-NO DSCNMKR DOCD: CPT | Performed by: STUDENT IN AN ORGANIZED HEALTH CARE EDUCATION/TRAINING PROGRAM

## 2024-05-28 PROCEDURE — G0439 PPPS, SUBSEQ VISIT: HCPCS | Performed by: STUDENT IN AN ORGANIZED HEALTH CARE EDUCATION/TRAINING PROGRAM

## 2024-05-28 SDOH — ECONOMIC STABILITY: FOOD INSECURITY: WITHIN THE PAST 12 MONTHS, YOU WORRIED THAT YOUR FOOD WOULD RUN OUT BEFORE YOU GOT MONEY TO BUY MORE.: NEVER TRUE

## 2024-05-28 SDOH — ECONOMIC STABILITY: HOUSING INSECURITY
IN THE LAST 12 MONTHS, WAS THERE A TIME WHEN YOU DID NOT HAVE A STEADY PLACE TO SLEEP OR SLEPT IN A SHELTER (INCLUDING NOW)?: NO

## 2024-05-28 SDOH — ECONOMIC STABILITY: FOOD INSECURITY: WITHIN THE PAST 12 MONTHS, THE FOOD YOU BOUGHT JUST DIDN'T LAST AND YOU DIDN'T HAVE MONEY TO GET MORE.: NEVER TRUE

## 2024-05-28 SDOH — ECONOMIC STABILITY: INCOME INSECURITY: HOW HARD IS IT FOR YOU TO PAY FOR THE VERY BASICS LIKE FOOD, HOUSING, MEDICAL CARE, AND HEATING?: NOT HARD AT ALL

## 2024-05-28 ASSESSMENT — PATIENT HEALTH QUESTIONNAIRE - PHQ9
SUM OF ALL RESPONSES TO PHQ QUESTIONS 1-9: 0
1. LITTLE INTEREST OR PLEASURE IN DOING THINGS: NOT AT ALL
2. FEELING DOWN, DEPRESSED OR HOPELESS: NOT AT ALL
SUM OF ALL RESPONSES TO PHQ QUESTIONS 1-9: 0
SUM OF ALL RESPONSES TO PHQ9 QUESTIONS 1 & 2: 0

## 2024-05-28 NOTE — PROGRESS NOTES
Medicare Annual Wellness Visit    Andrew Terry is here for Medicare AWV    Assessment & Plan   Medicare annual wellness visit, subsequent  Recommendations for Preventive Services Due: see orders and patient instructions/AVS.  Recommended screening schedule for the next 5-10 years is provided to the patient in written form: see Patient Instructions/AVS.     Return in 3 months (on 8/28/2024).     Subjective       Patient's complete Health Risk Assessment and screening values have been reviewed and are found in Flowsheets. The following problems were reviewed today and where indicated follow up appointments were made and/or referrals ordered.    Positive Risk Factor Screenings with Interventions:       Cognitive:      Words recalled: 0 Words Recalled     Total Score Interpretation: Abnormal Mini-Cog  Interventions:  Patient advised to follow-up in this office for further evaluation and treatment, saw neurologist.            Activity, Diet, and Weight:  On average, how many days per week do you engage in moderate to strenuous exercise (like a brisk walk)?: 0 days  On average, how many minutes do you engage in exercise at this level?: 0 min    Do you eat balanced/healthy meals regularly?: Yes    There is no height or weight on file to calculate BMI.      Inactivity Interventions:  Recommendations: patient agrees to exercise for at least 150 minutes/week          Vision Screen:  Do you have difficulty driving, watching TV, or doing any of your daily activities because of your eyesight?: No  Have you had an eye exam within the past year?: (!) No  No results found.    Interventions:   Patient encouraged to make appointment with their eye specialist    Safety:  Do you have any tripping hazards - loose or unsecured carpets or rugs?: (!) Yes  Do you have non-slip mats or non-slip surfaces or shower bars or grab bars in your shower or bathtub?: (!) No  Interventions:  Patient comments: safety measures d iscussed.

## 2024-05-28 NOTE — PATIENT INSTRUCTIONS
lenses of different strengths in front of each eye to see how strong your glasses or contact lenses need to be.  Visual field tests   Your doctor may have you look through special machines.  Or your doctor may simply have you stare straight ahead while they move a finger into and out of your field of vision.  Color vision test   You look at pieces of printed test patterns in various colors. You say what number or symbol you see.  Your doctor may have you trace the number or symbol using a pointer.  How do these tests feel?  There is very little chance of having a problem from this test. If dilating drops are used for a vision test, they may make the eyes sting and cause a medicine taste in the mouth.  Follow-up care is a key part of your treatment and safety. Be sure to make and go to all appointments, and call your doctor if you are having problems. It's also a good idea to know your test results and keep a list of the medicines you take.  Where can you learn more?  Go to https://www.[employer]net/patientEd and enter G551 to learn more about \"Learning About Vision Tests.\"  Current as of: June 5, 2023               Content Version: 14.0  © 3982-1770 [employer].   Care instructions adapted under license by [employer]. If you have questions about a medical condition or this instruction, always ask your healthcare professional. [employer] disclaims any warranty or liability for your use of this information.           A Healthy Heart: Care Instructions  Overview     Coronary artery disease, also called heart disease, occurs when a substance called plaque builds up in the vessels that supply oxygen-rich blood to your heart muscle. This can narrow the blood vessels and reduce blood flow. A heart attack happens when blood flow is completely blocked. A high-fat diet, smoking, and other factors increase the risk of heart disease.  Your doctor has found that you have a chance of having heart

## 2024-07-08 ENCOUNTER — TELEPHONE (OUTPATIENT)
Dept: CARDIOLOGY CLINIC | Age: 79
End: 2024-07-08

## 2024-07-08 NOTE — TELEPHONE ENCOUNTER
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    Andrew can be reached at [phone]

## 2024-07-08 NOTE — TELEPHONE ENCOUNTER
Called/spoke to PT and advised that Dr. Jacobson would like to see him in office before writing a return to work letter. PT verbalized that he is ok with waiting until his appt on 8/20 to get the letter.

## 2024-07-08 NOTE — TELEPHONE ENCOUNTER
There is an old letter from 8/2/23 in PT's chart concerning same issue. Will it be appropriate to use this letter and just update names and dates?

## 2024-08-12 ENCOUNTER — TELEPHONE (OUTPATIENT)
Dept: INTERNAL MEDICINE CLINIC | Age: 79
End: 2024-08-12

## 2024-08-12 NOTE — TELEPHONE ENCOUNTER
Pt is requesting a motorized scooter from [employer], he was told if he reached out to his pcp that they would be able to requested one form him.

## 2024-08-14 RX ORDER — APIXABAN 5 MG/1
5 TABLET, FILM COATED ORAL 2 TIMES DAILY
Qty: 180 TABLET | Refills: 0 | Status: SHIPPED | OUTPATIENT
Start: 2024-08-14

## 2024-08-14 NOTE — TELEPHONE ENCOUNTER
Last OV: 8/29/23  Next OV: 8/20/24  Last refill: 5/14/24  #180   Most recent Labs:   Last EKG (if needed): 8/29/23       Initial (On Arrival)

## 2024-08-16 NOTE — PROGRESS NOTES
02/05/2012 07:15 AM        Imaging:    ECG 08/20/2024   Sequential AV pacing    CT Angio Chest 7/20/24 ([facility])  IMPRESSION:   1. Standard pulmonary venous anatomy.    2. No acute findings.     Echo 08/25/2022  Summary Normal left ventricle size, wall thickness, and systolic function with an estimated ejection fraction of 55-60%. Diastolic filling parameters suggest grade I diastolic dysfunction. Abnormal (paradoxical) septal motion is present likely due to right ventricular pacing. Mild mitral regurgitation is present. The right ventricle is normal in size and function. TAPSE is 2.8 cm. Pacer / ICD wire is visualized in the right ventricle.    Cath: 1/12/2022  1. Left main normal gives rise to LAD and left circumflex.   2. Medium size vessel that wraps around the apex gives rise to medium size   diagonal branch.  There is mild disease proximal LAD.  LAD continues to   wraparound apex.   3. Left circumflex large dominant vessel gives rise to 2 small OM 1 and 2   branches, medium size OM3 branch and left PDA.  No stenosis present.   4. RCA codominant.  Has mild nonobstructive disease proximally.     LV filling pressure.  LVEDP is 8 mmHg   No AS   No complications.      Cardio/pulmonary stress test: 9/1/22  Moderately reduced exercise limitation with only at 50 % maximal exercise capacity achieved.   suggestive of Maximal work load of AT (anaerobic threshold) < 13 %   Possible muscle conditioning  Correlate Clinically   2.  Cardiac parameters overall within normal limits.  Low normal HR predicted  with possible use of beta blockers and normal VO2 pulse. Normal BP response.  Correlate Clinically   3.  Respiratory parameters without any evidence of obstruction and no significant decline in FEV1 with exercise.Elevated V Co 2 suggestive of possible dead space ventilation/ co 2 retention. normal A- a gradient post  exercise. correlate clinically.    Bilateral carotid US 09/13/2021  Summary No hemodynamically

## 2024-08-20 ENCOUNTER — OFFICE VISIT (OUTPATIENT)
Dept: CARDIOLOGY CLINIC | Age: 79
End: 2024-08-20
Payer: MEDICARE

## 2024-08-20 ENCOUNTER — NURSE ONLY (OUTPATIENT)
Dept: CARDIOLOGY CLINIC | Age: 79
End: 2024-08-20

## 2024-08-20 VITALS
WEIGHT: 156 LBS | SYSTOLIC BLOOD PRESSURE: 112 MMHG | HEART RATE: 59 BPM | OXYGEN SATURATION: 98 % | HEIGHT: 71 IN | BODY MASS INDEX: 21.84 KG/M2 | DIASTOLIC BLOOD PRESSURE: 66 MMHG

## 2024-08-20 DIAGNOSIS — Z95.0 CARDIAC PACEMAKER IN SITU: Primary | ICD-10-CM

## 2024-08-20 DIAGNOSIS — Z95.0 CARDIAC PACEMAKER IN SITU: ICD-10-CM

## 2024-08-20 DIAGNOSIS — R00.1 SYMPTOMATIC BRADYCARDIA: ICD-10-CM

## 2024-08-20 DIAGNOSIS — I49.5 SSS (SICK SINUS SYNDROME) (HCC): ICD-10-CM

## 2024-08-20 DIAGNOSIS — I48.0 PAROXYSMAL ATRIAL FIBRILLATION (HCC): ICD-10-CM

## 2024-08-20 DIAGNOSIS — I25.10 CORONARY ARTERY DISEASE, NON-OCCLUSIVE: ICD-10-CM

## 2024-08-20 DIAGNOSIS — Z79.01 CURRENT USE OF ANTICOAGULANT THERAPY: ICD-10-CM

## 2024-08-20 DIAGNOSIS — I48.4 ATYPICAL ATRIAL FLUTTER (HCC): ICD-10-CM

## 2024-08-20 DIAGNOSIS — Z95.0 PACEMAKER: ICD-10-CM

## 2024-08-20 DIAGNOSIS — I48.3 TYPICAL ATRIAL FLUTTER (HCC): ICD-10-CM

## 2024-08-20 DIAGNOSIS — I45.9 HB (HEART BLOCK): ICD-10-CM

## 2024-08-20 DIAGNOSIS — I44.1 SYMPTOMATIC ADVANCED HEART BLOCK: ICD-10-CM

## 2024-08-20 DIAGNOSIS — I48.0 PAROXYSMAL ATRIAL FIBRILLATION (HCC): Primary | ICD-10-CM

## 2024-08-20 PROCEDURE — 93000 ELECTROCARDIOGRAM COMPLETE: CPT | Performed by: INTERNAL MEDICINE

## 2024-08-20 PROCEDURE — 99214 OFFICE O/P EST MOD 30 MIN: CPT | Performed by: INTERNAL MEDICINE

## 2024-08-20 PROCEDURE — 1124F ACP DISCUSS-NO DSCNMKR DOCD: CPT | Performed by: INTERNAL MEDICINE

## 2024-09-03 ASSESSMENT — ENCOUNTER SYMPTOMS
EYE DISCHARGE: 0
BACK PAIN: 0
SORE THROAT: 0
EYE REDNESS: 0
CHEST TIGHTNESS: 0
ABDOMINAL PAIN: 0
COUGH: 0
VOMITING: 0
NAUSEA: 0
RHINORRHEA: 0

## 2024-09-03 NOTE — PROGRESS NOTES
Andrew Terry (:  1945) is a 79 y.o. male,New patient, here for evaluation of the following chief complaint(s):  Follow-up (Letter for scooter medical necessity )    Chronic Health issues include osteoarthritis, tobacco history, tremor question Parkinson's, pacemaker, paroxysmal atrial fibrillation, transient small bowel obstruction     Chronic rhinitis, postnasal drip.  States medications make him too sleepy.  Never used any nasal inhalers.  Has had evaluation with normal pulmonary function test cardiopulmonary exercise testing and cardiac evaluation all which have been unremarkable.  Feels like shortness of breath occurred after having COVID-vaccine.  States he walks and then stops for a few minutes and starts back up no chest pain or palpitations noted at that time.        Hospitalization 4/10/2022 for small bowel obstruction, hypertension, atrial fibrillation.  No surgery needed.  CT abdomen with indeterminate right hepatic lesion 2.3 cm in MRI follow-up recommended. Patient not interested to do any further workup.    Onset of intermittent atrial fibrillation   Underwent cardiac catheterization with mild coronary disease and ejection fraction of 63%.  Carotid ultrasounds were negative.  Darted on Eliquis and atorvastatin.  Ablation 2022.flecainide and Toprol discontinued and interrogation 2022 without arrhythmia.  Echocardiogram 2022 EF 55 to 60% grade 1 diastolic dysfunction paradoxical septal motion mild MR normal RV orthostatic hypotension.  [facility] cardiology appointment 2022 has a Biotronik PPM implant since 2017 when he suffered from bradycardia.    Follows Dr Glynn.     History of recurrent back surgeries film with large anterior osteophytes in the L3 4 level and L2-3. History of L4 5 fusion.  had intermittent pain in the right flank.        He has a 50 year history of smoking. CT of the chest  with stable pulm nodules and left lung base atelectasis.inceidental

## 2024-09-04 ENCOUNTER — OFFICE VISIT (OUTPATIENT)
Dept: INTERNAL MEDICINE CLINIC | Age: 79
End: 2024-09-04
Payer: MEDICARE

## 2024-09-04 VITALS
HEART RATE: 76 BPM | OXYGEN SATURATION: 93 % | BODY MASS INDEX: 21.48 KG/M2 | DIASTOLIC BLOOD PRESSURE: 78 MMHG | WEIGHT: 154 LBS | SYSTOLIC BLOOD PRESSURE: 118 MMHG

## 2024-09-04 DIAGNOSIS — I48.19 PERSISTENT ATRIAL FIBRILLATION (HCC): ICD-10-CM

## 2024-09-04 DIAGNOSIS — D68.69 SECONDARY HYPERCOAGULABLE STATE (HCC): ICD-10-CM

## 2024-09-04 DIAGNOSIS — G20.B1 PARKINSON'S DISEASE WITH DYSKINESIA, UNSPECIFIED WHETHER MANIFESTATIONS FLUCTUATE (HCC): Primary | ICD-10-CM

## 2024-09-04 DIAGNOSIS — Z74.09 REQUIRES SCOOTER FOR MOBILITY: ICD-10-CM

## 2024-09-04 PROCEDURE — 99214 OFFICE O/P EST MOD 30 MIN: CPT | Performed by: STUDENT IN AN ORGANIZED HEALTH CARE EDUCATION/TRAINING PROGRAM

## 2024-09-04 PROCEDURE — 1124F ACP DISCUSS-NO DSCNMKR DOCD: CPT | Performed by: STUDENT IN AN ORGANIZED HEALTH CARE EDUCATION/TRAINING PROGRAM

## 2024-09-04 PROCEDURE — 99080 SPECIAL REPORTS OR FORMS: CPT | Performed by: STUDENT IN AN ORGANIZED HEALTH CARE EDUCATION/TRAINING PROGRAM

## 2024-09-04 RX ORDER — DONEPEZIL HYDROCHLORIDE 5 MG/1
5 TABLET, FILM COATED ORAL NIGHTLY
COMMUNITY

## 2024-09-04 RX ORDER — PHENOL 1.4 %
10 AEROSOL, SPRAY (ML) MUCOUS MEMBRANE NIGHTLY
COMMUNITY
Start: 2023-03-10

## 2024-09-04 NOTE — ASSESSMENT & PLAN NOTE
Monitored by specialist- no acute findings meriting change in the plan episodes of bleeding.

## 2024-11-11 NOTE — TELEPHONE ENCOUNTER
Medication Refill    When was your last appointment with cardiology?    (If 1 yr or longer, please schedule appointment)    (If patient has been told they do not need to follow-up - medications should be filled by PCP)  08/2024    When did you last have labs drawn?     Medication needing refilled?  ELIQUIS      Dosage of the medication?  5 MG TABS tablet     How are you taking this medication (QD, BID, TID, QID, PRN)?  TAKE 1 TABLET BY MOUTH 2 TIMES A DAY     Do you want a 30 or 90 day supply?  90 day supply    When will you run out of your medication?     Which Pharmacy are we sending this medication to?  [id number] [place] [address] [address]      Pharmacy Phone:[phone]   Pharmacy Fax:[phone] (Pharmacy)

## 2024-11-26 NOTE — TELEPHONE ENCOUNTER
Medication Refill    When was your last appointment with cardiology?    (If 1 yr or longer, please schedule appointment)    (If patient has been told they do not need to follow-up - medications should be filled by PCP)  8/20/24    When did you last have labs drawn?   6/3/24    Medication needing refilled?  atorvastatin (LIPITOR)     Dosage of the medication?  40 MG tablet     How are you taking this medication (QD, BID, TID, QID, PRN)?  Take 1 tablet by mouth nightly     Do you want a 30 or 90 day supply?  90    When will you run out of your medication?   One week left    Which Pharmacy are we sending this medication to?  [id number] [address]   Pharmacy Phone:[phone]   Pharmacy Fax:[phone]

## 2024-11-27 RX ORDER — ATORVASTATIN CALCIUM 40 MG/1
40 TABLET, FILM COATED ORAL NIGHTLY
Qty: 90 TABLET | Refills: 3 | OUTPATIENT
Start: 2024-11-27

## 2024-11-27 NOTE — TELEPHONE ENCOUNTER
Last OV: 24 Verdick  Next OV: 25 Verdick   Last labs: 22 lipid  Last EK24  Last filled:    Disp Refills Start End     atorvastatin (LIPITOR) 40 MG tablet 90 tablet 3 11/3/2023 --    Sig - Route: Take 1 tablet by mouth nightly - Oral    Sent to pharmacy as: Atorvastatin Calcium 40 MG Oral Tablet (LIPITOR)    E-Prescribing Status: Receipt confirmed by pharmacy (11/3/2023  6:37 PM EDT)       Order queued.

## 2024-12-03 ENCOUNTER — TELEPHONE (OUTPATIENT)
Dept: CARDIOLOGY CLINIC | Age: 79
End: 2024-12-03

## 2024-12-03 NOTE — TELEPHONE ENCOUNTER
Jesus states his urologist told him that he can take 2 Viagra pills but he wants to make sure this is OK with cardio.    Please advise    Jesus's callback: [phone]

## 2024-12-03 NOTE — TELEPHONE ENCOUNTER
Discussed with Dr. Jacobson ok to take Viagra as prescribed by his urologist.  Please notify patient.

## 2024-12-05 ENCOUNTER — PATIENT MESSAGE (OUTPATIENT)
Dept: INTERNAL MEDICINE CLINIC | Age: 79
End: 2024-12-05

## 2024-12-05 DIAGNOSIS — E78.2 MIXED HYPERLIPIDEMIA: Primary | ICD-10-CM

## 2024-12-05 RX ORDER — ATORVASTATIN CALCIUM 40 MG/1
40 TABLET, FILM COATED ORAL NIGHTLY
Qty: 90 TABLET | Refills: 0 | Status: SHIPPED | OUTPATIENT
Start: 2024-12-05

## 2025-02-24 SDOH — ECONOMIC STABILITY: FOOD INSECURITY: WITHIN THE PAST 12 MONTHS, YOU WORRIED THAT YOUR FOOD WOULD RUN OUT BEFORE YOU GOT MONEY TO BUY MORE.: NEVER TRUE

## 2025-02-24 SDOH — HEALTH STABILITY: PHYSICAL HEALTH: ON AVERAGE, HOW MANY DAYS PER WEEK DO YOU ENGAGE IN MODERATE TO STRENUOUS EXERCISE (LIKE A BRISK WALK)?: 4 DAYS

## 2025-02-24 SDOH — ECONOMIC STABILITY: FOOD INSECURITY: WITHIN THE PAST 12 MONTHS, THE FOOD YOU BOUGHT JUST DIDN'T LAST AND YOU DIDN'T HAVE MONEY TO GET MORE.: NEVER TRUE

## 2025-02-24 SDOH — ECONOMIC STABILITY: INCOME INSECURITY: IN THE LAST 12 MONTHS, WAS THERE A TIME WHEN YOU WERE NOT ABLE TO PAY THE MORTGAGE OR RENT ON TIME?: NO

## 2025-02-24 SDOH — HEALTH STABILITY: PHYSICAL HEALTH: ON AVERAGE, HOW MANY MINUTES DO YOU ENGAGE IN EXERCISE AT THIS LEVEL?: 30 MIN

## 2025-02-24 SDOH — ECONOMIC STABILITY: TRANSPORTATION INSECURITY
IN THE PAST 12 MONTHS, HAS LACK OF TRANSPORTATION KEPT YOU FROM MEETINGS, WORK, OR FROM GETTING THINGS NEEDED FOR DAILY LIVING?: NO

## 2025-02-24 SDOH — ECONOMIC STABILITY: TRANSPORTATION INSECURITY
IN THE PAST 12 MONTHS, HAS THE LACK OF TRANSPORTATION KEPT YOU FROM MEDICAL APPOINTMENTS OR FROM GETTING MEDICATIONS?: NO

## 2025-02-24 ASSESSMENT — PATIENT HEALTH QUESTIONNAIRE - PHQ9
1. LITTLE INTEREST OR PLEASURE IN DOING THINGS: NOT AT ALL
SUM OF ALL RESPONSES TO PHQ QUESTIONS 1-9: 0
SUM OF ALL RESPONSES TO PHQ9 QUESTIONS 1 & 2: 0
2. FEELING DOWN, DEPRESSED OR HOPELESS: NOT AT ALL
SUM OF ALL RESPONSES TO PHQ QUESTIONS 1-9: 0

## 2025-02-24 ASSESSMENT — LIFESTYLE VARIABLES
HOW OFTEN DO YOU HAVE A DRINK CONTAINING ALCOHOL: NEVER
HOW OFTEN DO YOU HAVE SIX OR MORE DRINKS ON ONE OCCASION: 1
HOW MANY STANDARD DRINKS CONTAINING ALCOHOL DO YOU HAVE ON A TYPICAL DAY: 0
HOW OFTEN DO YOU HAVE A DRINK CONTAINING ALCOHOL: 1
HOW MANY STANDARD DRINKS CONTAINING ALCOHOL DO YOU HAVE ON A TYPICAL DAY: PATIENT DOES NOT DRINK

## 2025-02-27 ENCOUNTER — OFFICE VISIT (OUTPATIENT)
Dept: INTERNAL MEDICINE CLINIC | Age: 80
End: 2025-02-27
Payer: MEDICARE

## 2025-02-27 DIAGNOSIS — Z00.00 MEDICARE ANNUAL WELLNESS VISIT, SUBSEQUENT: Primary | ICD-10-CM

## 2025-02-27 PROCEDURE — G0439 PPPS, SUBSEQ VISIT: HCPCS | Performed by: STUDENT IN AN ORGANIZED HEALTH CARE EDUCATION/TRAINING PROGRAM

## 2025-02-27 PROCEDURE — 1124F ACP DISCUSS-NO DSCNMKR DOCD: CPT | Performed by: STUDENT IN AN ORGANIZED HEALTH CARE EDUCATION/TRAINING PROGRAM

## 2025-02-27 NOTE — PROGRESS NOTES
Medicare Annual Wellness Visit    Andrew Terry is here for Medicare AWV    Assessment & Plan   Medicare annual wellness visit, subsequent     No follow-ups on file.     Subjective       Patient's complete Health Risk Assessment and screening values have been reviewed and are found in Flowsheets. The following problems were reviewed today and where indicated follow up appointments were made and/or referrals ordered.    Positive Risk Factor Screenings with Interventions:    Fall Risk:  Do you feel unsteady or are you worried about falling? : (!) yes  2 or more falls in past year?: no  Fall with injury in past year?: no     Interventions:    Reviewed medications, home hazards, visual acuity, and co-morbidities that can increase risk for falls    Cognitive:      Words recalled: 2 Words Recalled     Total Score Interpretation: Abnormal Mini-Cog  Interventions:  History of parkinsonism  See AVS for additional education material                Hearing Screen:  Do you or your family notice any trouble with your hearing that hasn't been managed with hearing aids?: (!) Yes    Interventions:  Unable to wear hearing aid.  See AVS for additional education material                     Objective   There were no vitals filed for this visit.   There is no height or weight on file to calculate BMI.                  No Known Allergies  Prior to Visit Medications    Medication Sig Taking? Authorizing Provider   atorvastatin (LIPITOR) 40 MG tablet Take 1 tablet by mouth nightly  Gloria Sheehan MD   apixaban (ELIQUIS) 5 MG TABS tablet Take 1 tablet by mouth 2 times daily  Zion Jacobson MD   donepezil (ARICEPT) 5 MG tablet Take 1 tablet by mouth nightly  ProviderLinwood MD   Melatonin 10 MG TABS Take 10 mg by mouth at bedtime  ProviderLinwood MD   Handicap Placard MISC by Does not apply route Duration- 5 yrs.  Gloria Sheehan MD   carbidopa-levodopa (SINEMET)  MG per tablet Take 1 tablet by mouth 4 times daily

## 2025-03-07 ENCOUNTER — TELEPHONE (OUTPATIENT)
Dept: CARDIOLOGY CLINIC | Age: 80
End: 2025-03-07

## 2025-03-07 NOTE — TELEPHONE ENCOUNTER
Reviewed office note from [facility] physician group office note scanned into media.    PFT completed 1/24/2025 showed normal lung function, normal lung volumes and normal diffusion.     PFT not ordered by EP (Not currently on amiodarone, nor on amiodarone in the past).       Echo 2022 grade 1 diastolic dysfunction.     Grade 1 diastolic dysfunction\" refers to the mildest form of diastolic dysfunction, indicating a slight impairment in the heart's ability to relax between beats, often considered a normal finding in older adults and usually not a cause for major concern due to its mild nature.    We will defer to his PCP in regards to his concerns of low blood pressure.  PER [facility] physician group office note scanned into media, notes concerns that low BP may be contributing to his shortness of breath.  He is not on any antihypertensive medications.      Will ask patient when he returns call if he monitor his blood pressure at home and what readings he is getting.      In short nothing is needed prior to his follow up appointment with Dr. Jacobson in April.

## 2025-03-07 NOTE — TELEPHONE ENCOUNTER
PT had a pulmonary shape test on 2/24/25 and test revealed diastolic dysfunction, abnormal (paradoxical) septal motion is present likely due to right ventricular pacing. PT has an appt Scheduled 4/15/25. PT would like to know if we have any recs for him to start now before his appt in April? Scanned documents to PT's chart.

## 2025-03-07 NOTE — TELEPHONE ENCOUNTER
Called and spoke with patient.  He reports SBP in the morning can run 80-90's and he will have symptoms of presyncope and lightheadedness.    He is not on antihypertensive, medications.  Medications used for treatment of his parkinson may be contributing to his low blood pressure and encouraged him to follow up with his PCP and Neurologist regarding this. He reports he has an upcoming appointment with his neurlogist.  He reports he is drinking only about 32 ounces of fluid at day.  Instructed to stay well hydrated as dehydration can cause and or exacerbate his symtoms 10-12 (8 oz glasses)  of water daily. Increasing intake on hot days 12-15 (8 oz glasses) of water daily.    He reports he had SHAPE test done and is will also be following up with his general cardiologist in [place] regarding the results.    Patient asked if increasing rate on PPM would help his symptoms and I advised symptoms sound to be a results of low blood pressure as symptoms are not present with normal BP and the PPM would not change his blood pressure.    Reviewed with Dr. Jacobson who agreed with given recommendations.   8

## 2025-03-08 DIAGNOSIS — E78.2 MIXED HYPERLIPIDEMIA: ICD-10-CM

## 2025-03-10 RX ORDER — ATORVASTATIN CALCIUM 40 MG/1
40 TABLET, FILM COATED ORAL NIGHTLY
Qty: 90 TABLET | Refills: 0 | Status: SHIPPED | OUTPATIENT
Start: 2025-03-10

## 2025-03-10 NOTE — TELEPHONE ENCOUNTER
Medication:   Requested Prescriptions     Pending Prescriptions Disp Refills    atorvastatin (LIPITOR) 40 MG tablet [Pharmacy Med Name: ATORVASTATIN 40MG TABLETS] 90 tablet 0     Sig: TAKE 1 TABLET BY MOUTH EVERY NIGHT       Last Filled:      Patient Phone Number: [phone] (home)     Last appt: 2/27/2025   Next appt: 4/10/2025  Future Appointments   Date Time Provider Department Center   4/10/2025  8:00 AM Gloria Sheehan MD [facility]   4/15/2025  8:00 AM Zion Jacobson MD [facility]   5/13/2025  8:00 AM Martinez Davidsno MD [facility]   8/26/2025  8:00 AM SCHEDULE, I DEVICE CHECK [facility]   8/26/2025  8:30 AM Zion Jacobson MD [facility]

## 2025-03-28 NOTE — TELEPHONE ENCOUNTER
Last OV: 8/20/24  Next OV: 4/15/25  Last refill: 11/11/24 #180 1 R/F  Most recent Labs:   Last EKG (if needed): 8/20/24    Patient just got back from [place]. Patient is currently waiting on his prescription to be mailed to him from [place] and is requesting at least 20 pills to get him by while he waits on his prescription from [place].           · Continue home statin

## 2025-03-28 NOTE — TELEPHONE ENCOUNTER
**Please call patient once prescription is sent**    Medication Refill    When was your last appointment with cardiology?    (If 1 yr or longer, please schedule appointment)    (If patient has been told they do not need to follow-up - medications should be filled by PCP)  08/20/24    When did you last have labs drawn?     Medication needing refilled?  apixaban (ELIQUIS)     Dosage of the medication?  5 MG TABS tablet     How are you taking this medication (QD, BID, TID, QID, PRN)?   Take 1 tablet by mouth 2 times daily     Do you want a 30 or 90 day supply?  Patient just got back from [place]. Patient is currently waiting on his prescription to be mailed to him from [place] and is requesting at least 20 pills to get him by while he waits on his prescription from [place].     When will you run out of your medication?     Which Pharmacy are we sending this medication to?  [facility] [id number] - [place], [place] - [address] [place] [address]   Pharmacy Phone: [phone]   Pharmacy Fax: [phone]

## 2025-04-01 NOTE — PROGRESS NOTES
[facility]   Electrophysiology Consult    Date: 4/15/2025    CC:  Dizziness    HPI: Andrew Terry is a 79 y.o. 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    Patient presents to our office today as follow-up for management of atrial fibrillation, pacemaker.  Accompanied by his wife.  Reviewed [place] tasks as well as his daily blood pressure and heart rate checks.  He has been experiencing dizziness intermittently, not always with positional changes.  Typically during these times his blood pressure was low with systolics in the 90s, they have recently made medication changes including a reduction in his Parkinson medication and addition of midodrine which has helped both blood pressures subjectively as well as symptoms.    Review of System:  [x] Full ROS obtained and negative except as mentioned in HPI  No swelling, PND/orthopnea    Prior to Admission medications    Medication Sig Start Date End Date Taking? Authorizing Provider   Bromfenac Sodium 0.075 % SOLN Apply to eye 9/17/24  Yes ProviderLinwood MD   midodrine (PROAMATINE) 2.5 MG tablet Take 1 tablet by mouth 3 times daily 3/12/25  Yes Provider, MD Linwood   moxifloxacin (VIGAMOX) 0.5 % ophthalmic solution Apply to eye 9/17/24  Yes Provider, MD Linwood   tamsulosin (FLOMAX) 0.4 MG capsule Take 1 capsule by mouth daily   Yes Provider, MD Linwood   prednisoLONE acetate (PRED FORTE) 1 % ophthalmic suspension Apply to eye 9/17/24

## 2025-04-10 ENCOUNTER — OFFICE VISIT (OUTPATIENT)
Dept: INTERNAL MEDICINE CLINIC | Age: 80
End: 2025-04-10
Payer: MEDICARE

## 2025-04-10 VITALS
DIASTOLIC BLOOD PRESSURE: 70 MMHG | SYSTOLIC BLOOD PRESSURE: 118 MMHG | OXYGEN SATURATION: 99 % | BODY MASS INDEX: 21.76 KG/M2 | WEIGHT: 156 LBS | HEART RATE: 65 BPM

## 2025-04-10 DIAGNOSIS — E78.2 MIXED HYPERLIPIDEMIA: ICD-10-CM

## 2025-04-10 DIAGNOSIS — Z01.818 PREOP EXAMINATION: Primary | ICD-10-CM

## 2025-04-10 PROCEDURE — 1159F MED LIST DOCD IN RCRD: CPT | Performed by: STUDENT IN AN ORGANIZED HEALTH CARE EDUCATION/TRAINING PROGRAM

## 2025-04-10 PROCEDURE — 1124F ACP DISCUSS-NO DSCNMKR DOCD: CPT | Performed by: STUDENT IN AN ORGANIZED HEALTH CARE EDUCATION/TRAINING PROGRAM

## 2025-04-10 PROCEDURE — 99214 OFFICE O/P EST MOD 30 MIN: CPT | Performed by: STUDENT IN AN ORGANIZED HEALTH CARE EDUCATION/TRAINING PROGRAM

## 2025-04-10 PROCEDURE — G2211 COMPLEX E/M VISIT ADD ON: HCPCS | Performed by: STUDENT IN AN ORGANIZED HEALTH CARE EDUCATION/TRAINING PROGRAM

## 2025-04-10 PROCEDURE — 1160F RVW MEDS BY RX/DR IN RCRD: CPT | Performed by: STUDENT IN AN ORGANIZED HEALTH CARE EDUCATION/TRAINING PROGRAM

## 2025-04-10 RX ORDER — MIDODRINE HYDROCHLORIDE 2.5 MG/1
2.5 TABLET ORAL 3 TIMES DAILY
COMMUNITY
Start: 2025-03-12

## 2025-04-10 RX ORDER — BROMFENAC 0.76 MG/ML
SOLUTION/ DROPS OPHTHALMIC
COMMUNITY
Start: 2024-09-17

## 2025-04-10 RX ORDER — TAMSULOSIN HYDROCHLORIDE 0.4 MG/1
0.4 CAPSULE ORAL DAILY
COMMUNITY

## 2025-04-10 RX ORDER — MOXIFLOXACIN 5 MG/ML
SOLUTION/ DROPS OPHTHALMIC
COMMUNITY
Start: 2024-09-17

## 2025-04-10 RX ORDER — PREDNISOLONE ACETATE 10 MG/ML
SUSPENSION/ DROPS OPHTHALMIC
COMMUNITY
Start: 2024-09-17

## 2025-04-10 SDOH — ECONOMIC STABILITY: FOOD INSECURITY: WITHIN THE PAST 12 MONTHS, THE FOOD YOU BOUGHT JUST DIDN'T LAST AND YOU DIDN'T HAVE MONEY TO GET MORE.: NEVER TRUE

## 2025-04-10 SDOH — ECONOMIC STABILITY: FOOD INSECURITY: WITHIN THE PAST 12 MONTHS, YOU WORRIED THAT YOUR FOOD WOULD RUN OUT BEFORE YOU GOT MONEY TO BUY MORE.: NEVER TRUE

## 2025-04-10 ASSESSMENT — ENCOUNTER SYMPTOMS
GASTROINTESTINAL NEGATIVE: 1
RESPIRATORY NEGATIVE: 1
EYES NEGATIVE: 1
EYE DISCHARGE: 0
ALLERGIC/IMMUNOLOGIC NEGATIVE: 1
SHORTNESS OF BREATH: 0
ABDOMINAL PAIN: 0

## 2025-04-10 NOTE — PROGRESS NOTES
high risk surgery with multiple clinical predictors of CAD- 2 of the following: history of compensated or prior heart failure, history of cerebrovascular disease, DM, or renal insufficiency    Routine administration of higher-dose, long-acting metoprolol in beta-blocker-naïve patients on the day of surgery, and in the absence of dose titration is associated with an overall increase in mortality.  Beta-blockers should be started days to weeks prior to surgery and titrated to pulse < 70.  4. Deep vein thrombosis prophylaxis: regimen to be chosen by surgical team  5. Minimal risk procedure. No contraindications to planned surgery    - Lipid, Fasting; Future  - CBC with Auto Differential; Future  - Basic Metabolic Panel; Future    # Mixed hyperlipidemia  Chronic  Continue with Lipitor 40 mg daily pending labs.  - Lipid, Fasting; Future  - CBC with Auto Differential; Future  - Basic Metabolic Panel; Future         Surgeon's fax: [phone]

## 2025-04-11 ENCOUNTER — LAB (OUTPATIENT)
Dept: INTERNAL MEDICINE CLINIC | Age: 80
End: 2025-04-11

## 2025-04-11 DIAGNOSIS — E78.2 MIXED HYPERLIPIDEMIA: Primary | ICD-10-CM

## 2025-04-11 DIAGNOSIS — Z01.818 PREOP EXAMINATION: ICD-10-CM

## 2025-04-15 ENCOUNTER — OFFICE VISIT (OUTPATIENT)
Dept: CARDIOLOGY CLINIC | Age: 80
End: 2025-04-15
Payer: MEDICARE

## 2025-04-15 ENCOUNTER — TELEPHONE (OUTPATIENT)
Dept: INTERNAL MEDICINE CLINIC | Age: 80
End: 2025-04-15

## 2025-04-15 ENCOUNTER — RESULTS FOLLOW-UP (OUTPATIENT)
Dept: INTERNAL MEDICINE CLINIC | Age: 80
End: 2025-04-15

## 2025-04-15 ENCOUNTER — CLINICAL SUPPORT (OUTPATIENT)
Dept: CARDIOLOGY CLINIC | Age: 80
End: 2025-04-15

## 2025-04-15 VITALS
BODY MASS INDEX: 22.18 KG/M2 | OXYGEN SATURATION: 97 % | WEIGHT: 158.4 LBS | HEART RATE: 63 BPM | DIASTOLIC BLOOD PRESSURE: 78 MMHG | SYSTOLIC BLOOD PRESSURE: 98 MMHG | HEIGHT: 71 IN

## 2025-04-15 DIAGNOSIS — I48.3 TYPICAL ATRIAL FLUTTER (HCC): ICD-10-CM

## 2025-04-15 DIAGNOSIS — Z95.0 CARDIAC PACEMAKER IN SITU: ICD-10-CM

## 2025-04-15 DIAGNOSIS — Z79.01 CURRENT USE OF ANTICOAGULANT THERAPY: ICD-10-CM

## 2025-04-15 DIAGNOSIS — I48.0 PAROXYSMAL ATRIAL FIBRILLATION (HCC): ICD-10-CM

## 2025-04-15 DIAGNOSIS — I49.5 SSS (SICK SINUS SYNDROME) (HCC): Primary | ICD-10-CM

## 2025-04-15 DIAGNOSIS — I25.10 CORONARY ARTERY DISEASE, NON-OCCLUSIVE: ICD-10-CM

## 2025-04-15 LAB
ANION GAP SERPL CALCULATED.3IONS-SCNC: 9 MMOL/L (ref 3–16)
BASOPHILS # BLD: 0.1 K/UL (ref 0–0.2)
BASOPHILS NFR BLD: 1.3 %
BUN SERPL-MCNC: 19 MG/DL (ref 7–20)
CALCIUM SERPL-MCNC: 9.3 MG/DL (ref 8.3–10.6)
CHLORIDE SERPL-SCNC: 102 MMOL/L (ref 99–110)
CHOLEST SERPL-MCNC: 135 MG/DL (ref 0–199)
CO2 SERPL-SCNC: 27 MMOL/L (ref 21–32)
CREAT SERPL-MCNC: 1.2 MG/DL (ref 0.8–1.3)
DEPRECATED RDW RBC AUTO: 13.1 % (ref 12.4–15.4)
EOSINOPHIL # BLD: 0.1 K/UL (ref 0–0.6)
EOSINOPHIL NFR BLD: 2 %
GFR SERPLBLD CREATININE-BSD FMLA CKD-EPI: 61 ML/MIN/{1.73_M2}
GLUCOSE SERPL-MCNC: 96 MG/DL (ref 70–99)
HCT VFR BLD AUTO: 37.8 % (ref 40.5–52.5)
HDLC SERPL-MCNC: 68 MG/DL (ref 40–60)
HGB BLD-MCNC: 12.9 G/DL (ref 13.5–17.5)
LDL CHOLESTEROL: 57 MG/DL
LYMPHOCYTES # BLD: 1.3 K/UL (ref 1–5.1)
LYMPHOCYTES NFR BLD: 27.1 %
MCH RBC QN AUTO: 31.8 PG (ref 26–34)
MCHC RBC AUTO-ENTMCNC: 34.2 G/DL (ref 31–36)
MCV RBC AUTO: 92.9 FL (ref 80–100)
MONOCYTES # BLD: 0.4 K/UL (ref 0–1.3)
MONOCYTES NFR BLD: 8.3 %
NEUTROPHILS # BLD: 2.9 K/UL (ref 1.7–7.7)
NEUTROPHILS NFR BLD: 61.3 %
PLATELET # BLD AUTO: 245 K/UL (ref 135–450)
PMV BLD AUTO: 8.8 FL (ref 5–10.5)
POTASSIUM SERPL-SCNC: 4.2 MMOL/L (ref 3.5–5.1)
RBC # BLD AUTO: 4.07 M/UL (ref 4.2–5.9)
SODIUM SERPL-SCNC: 138 MMOL/L (ref 136–145)
TRIGL SERPL-MCNC: 52 MG/DL (ref 0–150)
VLDLC SERPL CALC-MCNC: 10 MG/DL
WBC # BLD AUTO: 4.7 K/UL (ref 4–11)

## 2025-04-15 PROCEDURE — G2211 COMPLEX E/M VISIT ADD ON: HCPCS | Performed by: INTERNAL MEDICINE

## 2025-04-15 PROCEDURE — 93000 ELECTROCARDIOGRAM COMPLETE: CPT | Performed by: INTERNAL MEDICINE

## 2025-04-15 PROCEDURE — 1159F MED LIST DOCD IN RCRD: CPT | Performed by: INTERNAL MEDICINE

## 2025-04-15 PROCEDURE — 99214 OFFICE O/P EST MOD 30 MIN: CPT | Performed by: INTERNAL MEDICINE

## 2025-04-15 PROCEDURE — 1124F ACP DISCUSS-NO DSCNMKR DOCD: CPT | Performed by: INTERNAL MEDICINE

## 2025-05-06 ENCOUNTER — HOSPITAL ENCOUNTER (OUTPATIENT)
Dept: CARDIOLOGY | Age: 80
Discharge: HOME | End: 2025-05-08
Attending: INTERNAL MEDICINE
Payer: MEDICARE

## 2025-05-06 VITALS
WEIGHT: 158 LBS | DIASTOLIC BLOOD PRESSURE: 78 MMHG | BODY MASS INDEX: 22.12 KG/M2 | HEIGHT: 71 IN | SYSTOLIC BLOOD PRESSURE: 98 MMHG

## 2025-05-06 DIAGNOSIS — I48.3 TYPICAL ATRIAL FLUTTER (HCC): ICD-10-CM

## 2025-05-06 LAB
ECHO AO ASC DIAM: 3.3 CM
ECHO AO ASCENDING AORTA INDEX: 1.73 CM/M2
ECHO AO ROOT DIAM: 3.2 CM
ECHO AO ROOT INDEX: 1.68 CM/M2
ECHO AV PEAK GRADIENT: 6 MMHG
ECHO AV PEAK VELOCITY: 1.2 M/S
ECHO BSA: 1.89 M2
ECHO EST RA PRESSURE: 3 MMHG
ECHO LA AREA 2C: 16.3 CM2
ECHO LA AREA 4C: 15.1 CM2
ECHO LA DIAMETER INDEX: 1.62 CM/M2
ECHO LA DIAMETER: 3.1 CM
ECHO LA MAJOR AXIS: 5.5 CM
ECHO LA MINOR AXIS: 4.7 CM
ECHO LA TO AORTIC ROOT RATIO: 0.97
ECHO LA VOL BP: 41 ML (ref 18–58)
ECHO LA VOL MOD A2C: 46 ML (ref 18–58)
ECHO LA VOL MOD A4C: 32 ML (ref 18–58)
ECHO LA VOL/BSA BIPLANE: 21 ML/M2 (ref 16–34)
ECHO LA VOLUME INDEX MOD A2C: 24 ML/M2 (ref 16–34)
ECHO LA VOLUME INDEX MOD A4C: 17 ML/M2 (ref 16–34)
ECHO LV EDV A2C: 87 ML
ECHO LV EDV A4C: 79 ML
ECHO LV EDV INDEX A4C: 41 ML/M2
ECHO LV EDV NDEX A2C: 46 ML/M2
ECHO LV EF PHYSICIAN: 60 %
ECHO LV EJECTION FRACTION A2C: 62 %
ECHO LV EJECTION FRACTION A4C: 47 %
ECHO LV EJECTION FRACTION BIPLANE: 53 % (ref 55–100)
ECHO LV ESV A2C: 33 ML
ECHO LV ESV A4C: 42 ML
ECHO LV ESV INDEX A2C: 17 ML/M2
ECHO LV ESV INDEX A4C: 22 ML/M2
ECHO LV FRACTIONAL SHORTENING: 23 % (ref 28–44)
ECHO LV INTERNAL DIMENSION DIASTOLE INDEX: 2.46 CM/M2
ECHO LV INTERNAL DIMENSION DIASTOLIC: 4.7 CM (ref 4.2–5.9)
ECHO LV INTERNAL DIMENSION SYSTOLIC INDEX: 1.88 CM/M2
ECHO LV INTERNAL DIMENSION SYSTOLIC: 3.6 CM
ECHO LV IVSD: 1 CM (ref 0.6–1)
ECHO LV MASS 2D: 164.5 G (ref 88–224)
ECHO LV MASS INDEX 2D: 86.1 G/M2 (ref 49–115)
ECHO LV POSTERIOR WALL DIASTOLIC: 1 CM (ref 0.6–1)
ECHO LV RELATIVE WALL THICKNESS RATIO: 0.43
ECHO LVOT AREA: 3.5 CM2
ECHO LVOT DIAM: 2.1 CM
ECHO MV MAX VELOCITY: 1.1 M/S
ECHO MV MEAN GRADIENT: 3 MMHG
ECHO MV MEAN VELOCITY: 0.7 M/S
ECHO MV PEAK GRADIENT: 5 MMHG
ECHO MV VTI: 25.7 CM
ECHO RA AREA 4C: 19.8 CM2
ECHO RA END SYSTOLIC VOLUME APICAL 4 CHAMBER INDEX BSA: 32 ML/M2
ECHO RA VOLUME: 62 ML
ECHO RIGHT VENTRICULAR SYSTOLIC PRESSURE (RVSP): 39 MMHG
ECHO RV BASAL DIMENSION: 4.2 CM
ECHO RV LONGITUDINAL DIMENSION: 7.6 CM
ECHO RV MID DIMENSION: 3.4 CM
ECHO RV TAPSE: 2.4 CM (ref 1.7–?)
ECHO TV REGURGITANT MAX VELOCITY: 3 M/S
ECHO TV REGURGITANT PEAK GRADIENT: 31 MMHG

## 2025-05-06 PROCEDURE — 93325 DOPPLER ECHO COLOR FLOW MAPG: CPT

## 2025-05-06 PROCEDURE — 93308 TTE F-UP OR LMTD: CPT | Performed by: INTERNAL MEDICINE

## 2025-05-06 PROCEDURE — 93325 DOPPLER ECHO COLOR FLOW MAPG: CPT | Performed by: INTERNAL MEDICINE

## 2025-05-06 PROCEDURE — 93321 DOPPLER ECHO F-UP/LMTD STD: CPT | Performed by: INTERNAL MEDICINE

## 2025-06-03 DIAGNOSIS — E78.2 MIXED HYPERLIPIDEMIA: ICD-10-CM

## 2025-06-03 NOTE — TELEPHONE ENCOUNTER
Received faxed refill request for Atorvastatin 40 mg      Last OV: 4/15/25 [facility]  Next OV: on recall list   Last labs: 25 Lipids   Last EK/15/25  Last filled:    Disp Refills Start End     atorvastatin (LIPITOR) 40 MG tablet 90 tablet 0 3/10/2025 --    Sig - Route: TAKE 1 TABLET BY MOUTH EVERY NIGHT - Oral        Left message for pt to return call to verify that he is using [facility]. Last rx for this medication was sent to pharmacy in [place].

## 2025-06-05 RX ORDER — ATORVASTATIN CALCIUM 40 MG/1
40 TABLET, FILM COATED ORAL NIGHTLY
Qty: 90 TABLET | Refills: 0 | Status: SHIPPED | OUTPATIENT
Start: 2025-06-05

## 2025-07-03 ENCOUNTER — PATIENT MESSAGE (OUTPATIENT)
Dept: CARDIOLOGY CLINIC | Age: 80
End: 2025-07-03

## 2025-07-09 RX ORDER — MIDODRINE HYDROCHLORIDE 2.5 MG/1
TABLET ORAL
Qty: 120 TABLET | Refills: 5 | Status: SHIPPED | OUTPATIENT
Start: 2025-07-09

## 2025-08-12 ENCOUNTER — TELEPHONE (OUTPATIENT)
Dept: CARDIOLOGY CLINIC | Age: 80
End: 2025-08-12

## 2025-08-14 ENCOUNTER — OFFICE VISIT (OUTPATIENT)
Dept: INTERNAL MEDICINE CLINIC | Age: 80
End: 2025-08-14

## 2025-08-14 VITALS
WEIGHT: 152 LBS | OXYGEN SATURATION: 97 % | HEART RATE: 69 BPM | DIASTOLIC BLOOD PRESSURE: 62 MMHG | BODY MASS INDEX: 21.2 KG/M2 | SYSTOLIC BLOOD PRESSURE: 110 MMHG

## 2025-08-14 DIAGNOSIS — I95.1 ORTHOSTATIC HYPOTENSION: Primary | ICD-10-CM

## 2025-08-14 DIAGNOSIS — G20.B1 PARKINSON'S DISEASE WITH DYSKINESIA, UNSPECIFIED WHETHER MANIFESTATIONS FLUCTUATE (HCC): ICD-10-CM

## 2025-08-14 RX ORDER — MIDODRINE HYDROCHLORIDE 5 MG/1
5 TABLET ORAL 3 TIMES DAILY
Qty: 90 TABLET | Refills: 1 | Status: SHIPPED | OUTPATIENT
Start: 2025-08-14 | End: 2025-08-14

## 2025-08-14 RX ORDER — MIDODRINE HYDROCHLORIDE 5 MG/1
5 TABLET ORAL 3 TIMES DAILY
Qty: 90 TABLET | Refills: 1 | Status: SHIPPED | OUTPATIENT
Start: 2025-08-14

## 2025-08-14 ASSESSMENT — ENCOUNTER SYMPTOMS
SORE THROAT: 0
RHINORRHEA: 0
CHEST TIGHTNESS: 0
BACK PAIN: 0
NAUSEA: 0
VOMITING: 0
COUGH: 0
EYE DISCHARGE: 0
ABDOMINAL PAIN: 0
EYE REDNESS: 0

## 2025-08-20 ENCOUNTER — HOSPITAL ENCOUNTER (EMERGENCY)
Age: 80
Discharge: HOME | End: 2025-08-20
Attending: EMERGENCY MEDICINE
Payer: MEDICARE

## 2025-08-20 ENCOUNTER — APPOINTMENT (OUTPATIENT)
Dept: CT IMAGING | Age: 80
End: 2025-08-20
Payer: MEDICARE

## 2025-08-20 VITALS
RESPIRATION RATE: 20 BRPM | HEART RATE: 99 BPM | HEIGHT: 70 IN | TEMPERATURE: 98.1 F | WEIGHT: 154 LBS | SYSTOLIC BLOOD PRESSURE: 111 MMHG | OXYGEN SATURATION: 99 % | DIASTOLIC BLOOD PRESSURE: 51 MMHG | BODY MASS INDEX: 22.05 KG/M2

## 2025-08-20 DIAGNOSIS — N30.01 ACUTE CYSTITIS WITH HEMATURIA: Primary | ICD-10-CM

## 2025-08-20 LAB
AMORPH SED URNS QL MICRO: ABNORMAL /HPF
BACTERIA URNS QL MICRO: ABNORMAL /HPF
BILIRUB UR QL STRIP.AUTO: ABNORMAL
CLARITY UR: ABNORMAL
COLOR UR: ABNORMAL
EPI CELLS #/AREA URNS HPF: ABNORMAL /HPF (ref 0–5)
GLUCOSE UR STRIP.AUTO-MCNC: NEGATIVE MG/DL
HGB UR QL STRIP.AUTO: ABNORMAL
HYALINE CASTS #/AREA URNS LPF: ABNORMAL /LPF (ref 0–2)
KETONES UR STRIP.AUTO-MCNC: 15 MG/DL
LEUKOCYTE ESTERASE UR QL STRIP.AUTO: ABNORMAL
NITRITE UR QL STRIP.AUTO: POSITIVE
PH UR STRIP.AUTO: 5 [PH] (ref 5–8)
PROT UR STRIP.AUTO-MCNC: >=300 MG/DL
RBC #/AREA URNS HPF: >100 /HPF (ref 0–4)
SP GR UR STRIP.AUTO: 1.02 (ref 1–1.03)
UA COMPLETE W REFLEX CULTURE PNL UR: ABNORMAL
UA DIPSTICK W REFLEX MICRO PNL UR: YES
URN SPEC COLLECT METH UR: ABNORMAL
UROBILINOGEN UR STRIP-ACNC: 1 E.U./DL
WBC #/AREA URNS HPF: ABNORMAL /HPF (ref 0–5)

## 2025-08-20 PROCEDURE — 99284 EMERGENCY DEPT VISIT MOD MDM: CPT

## 2025-08-20 PROCEDURE — 81001 URINALYSIS AUTO W/SCOPE: CPT

## 2025-08-20 PROCEDURE — 74176 CT ABD & PELVIS W/O CONTRAST: CPT

## 2025-08-20 RX ORDER — CEPHALEXIN 500 MG/1
500 CAPSULE ORAL 2 TIMES DAILY
Qty: 14 CAPSULE | Refills: 0 | Status: SHIPPED | OUTPATIENT
Start: 2025-08-20 | End: 2025-08-27

## 2025-08-20 ASSESSMENT — PAIN - FUNCTIONAL ASSESSMENT
PAIN_FUNCTIONAL_ASSESSMENT: 0-10
PAIN_FUNCTIONAL_ASSESSMENT: 0-10

## 2025-08-20 ASSESSMENT — PAIN SCALES - GENERAL
PAINLEVEL_OUTOF10: 0
PAINLEVEL_OUTOF10: 0

## 2025-08-21 ENCOUNTER — TELEPHONE (OUTPATIENT)
Dept: INTERNAL MEDICINE CLINIC | Age: 80
End: 2025-08-21

## 2025-08-21 ENCOUNTER — HOSPITAL ENCOUNTER (EMERGENCY)
Age: 80
Discharge: HOME | End: 2025-08-21
Attending: EMERGENCY MEDICINE
Payer: MEDICARE

## 2025-08-21 VITALS
HEART RATE: 72 BPM | RESPIRATION RATE: 20 BRPM | WEIGHT: 151 LBS | TEMPERATURE: 98.4 F | BODY MASS INDEX: 21.67 KG/M2 | DIASTOLIC BLOOD PRESSURE: 66 MMHG | OXYGEN SATURATION: 97 % | SYSTOLIC BLOOD PRESSURE: 132 MMHG

## 2025-08-21 DIAGNOSIS — N39.0 URINARY TRACT INFECTION IN MALE: Primary | ICD-10-CM

## 2025-08-21 LAB
ALBUMIN SERPL-MCNC: 3.1 G/DL (ref 3.4–5)
ALBUMIN/GLOB SERPL: 1.1 {RATIO} (ref 1.1–2.2)
ALP SERPL-CCNC: 41 U/L (ref 40–129)
ALT SERPL-CCNC: 7 U/L (ref 10–40)
ANION GAP SERPL CALCULATED.3IONS-SCNC: 10 MMOL/L (ref 3–16)
AST SERPL-CCNC: 22 U/L (ref 15–37)
BACTERIA URNS QL MICRO: ABNORMAL /HPF
BASOPHILS # BLD: 0.2 K/UL (ref 0–0.2)
BASOPHILS NFR BLD: 1.2 %
BILIRUB SERPL-MCNC: 0.7 MG/DL (ref 0–1)
BILIRUB UR QL STRIP.AUTO: NEGATIVE
BUN SERPL-MCNC: 16 MG/DL (ref 7–20)
CALCIUM SERPL-MCNC: 8.2 MG/DL (ref 8.3–10.6)
CHARACTER UR: ABNORMAL
CHLORIDE SERPL-SCNC: 104 MMOL/L (ref 99–110)
CLARITY UR: CLEAR
CO2 SERPL-SCNC: 22 MMOL/L (ref 21–32)
COLOR UR: YELLOW
CREAT SERPL-MCNC: 1.1 MG/DL (ref 0.8–1.3)
DEPRECATED RDW RBC AUTO: 13.2 % (ref 12.4–15.4)
EOSINOPHIL # BLD: 0 K/UL (ref 0–0.6)
EOSINOPHIL NFR BLD: 0.1 %
EPI CELLS #/AREA URNS HPF: ABNORMAL /HPF (ref 0–5)
GFR SERPLBLD CREATININE-BSD FMLA CKD-EPI: 67 ML/MIN/{1.73_M2}
GLUCOSE SERPL-MCNC: 116 MG/DL (ref 70–99)
GLUCOSE UR STRIP.AUTO-MCNC: NEGATIVE MG/DL
HCT VFR BLD AUTO: 36.8 % (ref 40.5–52.5)
HGB BLD-MCNC: 12.2 G/DL (ref 13.5–17.5)
HGB UR QL STRIP.AUTO: ABNORMAL
KETONES UR STRIP.AUTO-MCNC: ABNORMAL MG/DL
LACTATE BLDV-SCNC: 1.3 MMOL/L (ref 0.4–1.9)
LEUKOCYTE ESTERASE UR QL STRIP.AUTO: ABNORMAL
LYMPHOCYTES # BLD: 0.8 K/UL (ref 1–5.1)
LYMPHOCYTES NFR BLD: 6 %
MCH RBC QN AUTO: 30.5 PG (ref 26–34)
MCHC RBC AUTO-ENTMCNC: 33.2 G/DL (ref 31–36)
MCV RBC AUTO: 91.8 FL (ref 80–100)
MONOCYTES # BLD: 1 K/UL (ref 0–1.3)
MONOCYTES NFR BLD: 7.8 %
MUCOUS THREADS #/AREA URNS LPF: ABNORMAL /LPF
NEUTROPHILS # BLD: 11.4 K/UL (ref 1.7–7.7)
NEUTROPHILS NFR BLD: 84.9 %
NITRITE UR QL STRIP.AUTO: NEGATIVE
PH UR STRIP.AUTO: 6 [PH] (ref 5–8)
PLATELET # BLD AUTO: 164 K/UL (ref 135–450)
PMV BLD AUTO: 8.5 FL (ref 5–10.5)
POTASSIUM SERPL-SCNC: 3.8 MMOL/L (ref 3.5–5.1)
PROT SERPL-MCNC: 5.9 G/DL (ref 6.4–8.2)
PROT UR STRIP.AUTO-MCNC: 100 MG/DL
RBC # BLD AUTO: 4 M/UL (ref 4.2–5.9)
RBC #/AREA URNS HPF: ABNORMAL /HPF (ref 0–4)
SODIUM SERPL-SCNC: 136 MMOL/L (ref 136–145)
SP GR UR STRIP.AUTO: >=1.03 (ref 1–1.03)
UA COMPLETE W REFLEX CULTURE PNL UR: ABNORMAL
UA DIPSTICK W REFLEX MICRO PNL UR: YES
URN SPEC COLLECT METH UR: ABNORMAL
UROBILINOGEN UR STRIP-ACNC: 1 E.U./DL
WBC # BLD AUTO: 13.5 K/UL (ref 4–11)
WBC #/AREA URNS HPF: ABNORMAL /HPF (ref 0–5)

## 2025-08-21 PROCEDURE — 51798 US URINE CAPACITY MEASURE: CPT

## 2025-08-21 PROCEDURE — 81001 URINALYSIS AUTO W/SCOPE: CPT

## 2025-08-21 PROCEDURE — 85025 COMPLETE CBC W/AUTO DIFF WBC: CPT

## 2025-08-21 PROCEDURE — 6370000000 HC RX 637 (ALT 250 FOR IP): Performed by: PHYSICIAN ASSISTANT

## 2025-08-21 PROCEDURE — 2580000003 HC RX 258: Performed by: PHYSICIAN ASSISTANT

## 2025-08-21 PROCEDURE — 80053 COMPREHEN METABOLIC PANEL: CPT

## 2025-08-21 PROCEDURE — 99284 EMERGENCY DEPT VISIT MOD MDM: CPT

## 2025-08-21 PROCEDURE — 83605 ASSAY OF LACTIC ACID: CPT

## 2025-08-21 RX ORDER — PHENAZOPYRIDINE HYDROCHLORIDE 200 MG/1
200 TABLET, FILM COATED ORAL DAILY
Qty: 2 TABLET | Refills: 0 | Status: SHIPPED | OUTPATIENT
Start: 2025-08-21 | End: 2025-08-23

## 2025-08-21 RX ORDER — 0.9 % SODIUM CHLORIDE 0.9 %
1000 INTRAVENOUS SOLUTION INTRAVENOUS ONCE
Status: COMPLETED | OUTPATIENT
Start: 2025-08-21 | End: 2025-08-21

## 2025-08-21 RX ORDER — CARBIDOPA AND LEVODOPA 25; 100 MG/1; MG/1
1 TABLET ORAL ONCE
Status: COMPLETED | OUTPATIENT
Start: 2025-08-21 | End: 2025-08-21

## 2025-08-21 RX ADMIN — SODIUM CHLORIDE 1000 ML: 0.9 INJECTION, SOLUTION INTRAVENOUS at 11:05

## 2025-08-21 RX ADMIN — CARBIDOPA AND LEVODOPA 1 TABLET: 25; 100 TABLET ORAL at 11:27

## 2025-08-21 ASSESSMENT — PAIN - FUNCTIONAL ASSESSMENT
PAIN_FUNCTIONAL_ASSESSMENT: 0-10
PAIN_FUNCTIONAL_ASSESSMENT: 0-10

## 2025-08-21 ASSESSMENT — PAIN SCALES - GENERAL
PAINLEVEL_OUTOF10: 0
PAINLEVEL_OUTOF10: 0

## 2025-08-22 ENCOUNTER — CARE COORDINATION (OUTPATIENT)
Dept: CARE COORDINATION | Age: 80
End: 2025-08-22

## 2025-08-23 DIAGNOSIS — E78.2 MIXED HYPERLIPIDEMIA: ICD-10-CM

## 2025-08-25 ENCOUNTER — CARE COORDINATION (OUTPATIENT)
Dept: CARE COORDINATION | Age: 80
End: 2025-08-25

## 2025-08-25 RX ORDER — ATORVASTATIN CALCIUM 40 MG/1
TABLET, FILM COATED ORAL
Qty: 90 TABLET | Refills: 0 | Status: SHIPPED | OUTPATIENT
Start: 2025-08-25

## 2025-08-28 ENCOUNTER — CARE COORDINATION (OUTPATIENT)
Dept: CARE COORDINATION | Age: 80
End: 2025-08-28

## 2025-09-02 DIAGNOSIS — E78.2 MIXED HYPERLIPIDEMIA: ICD-10-CM

## 2025-09-03 RX ORDER — ATORVASTATIN CALCIUM 40 MG/1
TABLET, FILM COATED ORAL
Qty: 90 TABLET | Refills: 3 | Status: SHIPPED | OUTPATIENT
Start: 2025-09-03